# Patient Record
Sex: MALE | Race: WHITE | NOT HISPANIC OR LATINO | Employment: OTHER | URBAN - METROPOLITAN AREA
[De-identification: names, ages, dates, MRNs, and addresses within clinical notes are randomized per-mention and may not be internally consistent; named-entity substitution may affect disease eponyms.]

---

## 2017-01-05 ENCOUNTER — HISTORICAL (OUTPATIENT)
Dept: LAB | Facility: HOSPITAL | Age: 72
End: 2017-01-05

## 2017-01-12 ENCOUNTER — HISTORICAL (OUTPATIENT)
Dept: RADIOLOGY | Facility: HOSPITAL | Age: 72
End: 2017-01-12

## 2017-02-16 ENCOUNTER — HISTORICAL (OUTPATIENT)
Dept: ADMINISTRATIVE | Facility: HOSPITAL | Age: 72
End: 2017-02-16

## 2017-03-18 ENCOUNTER — HISTORICAL (OUTPATIENT)
Dept: ADMINISTRATIVE | Facility: HOSPITAL | Age: 72
End: 2017-03-18

## 2017-03-19 ENCOUNTER — HISTORICAL (OUTPATIENT)
Dept: ADMINISTRATIVE | Facility: HOSPITAL | Age: 72
End: 2017-03-19

## 2017-03-20 ENCOUNTER — HISTORICAL (OUTPATIENT)
Dept: ADMINISTRATIVE | Facility: HOSPITAL | Age: 72
End: 2017-03-20

## 2017-03-21 ENCOUNTER — HISTORICAL (OUTPATIENT)
Dept: ADMINISTRATIVE | Facility: HOSPITAL | Age: 72
End: 2017-03-21

## 2017-03-22 ENCOUNTER — HISTORICAL (OUTPATIENT)
Dept: ADMINISTRATIVE | Facility: HOSPITAL | Age: 72
End: 2017-03-22

## 2017-03-23 ENCOUNTER — HISTORICAL (OUTPATIENT)
Dept: ADMINISTRATIVE | Facility: HOSPITAL | Age: 72
End: 2017-03-23

## 2017-03-24 ENCOUNTER — HISTORICAL (OUTPATIENT)
Dept: ADMINISTRATIVE | Facility: HOSPITAL | Age: 72
End: 2017-03-24

## 2017-03-25 ENCOUNTER — HISTORICAL (OUTPATIENT)
Dept: ADMINISTRATIVE | Facility: HOSPITAL | Age: 72
End: 2017-03-25

## 2017-03-26 ENCOUNTER — HISTORICAL (OUTPATIENT)
Dept: ADMINISTRATIVE | Facility: HOSPITAL | Age: 72
End: 2017-03-26

## 2017-03-27 ENCOUNTER — HISTORICAL (OUTPATIENT)
Dept: ADMINISTRATIVE | Facility: HOSPITAL | Age: 72
End: 2017-03-27

## 2017-03-28 ENCOUNTER — HISTORICAL (OUTPATIENT)
Dept: ADMINISTRATIVE | Facility: HOSPITAL | Age: 72
End: 2017-03-28

## 2017-03-29 ENCOUNTER — HISTORICAL (OUTPATIENT)
Dept: ADMINISTRATIVE | Facility: HOSPITAL | Age: 72
End: 2017-03-29

## 2017-03-30 ENCOUNTER — HISTORICAL (OUTPATIENT)
Dept: ADMINISTRATIVE | Facility: HOSPITAL | Age: 72
End: 2017-03-30

## 2017-03-31 ENCOUNTER — HISTORICAL (OUTPATIENT)
Dept: ADMINISTRATIVE | Facility: HOSPITAL | Age: 72
End: 2017-03-31

## 2017-04-01 ENCOUNTER — HISTORICAL (OUTPATIENT)
Dept: INFUSION THERAPY | Facility: HOSPITAL | Age: 72
End: 2017-04-01

## 2017-04-02 ENCOUNTER — HISTORICAL (OUTPATIENT)
Dept: ADMINISTRATIVE | Facility: HOSPITAL | Age: 72
End: 2017-04-02

## 2017-04-03 ENCOUNTER — HISTORICAL (OUTPATIENT)
Dept: ADMINISTRATIVE | Facility: HOSPITAL | Age: 72
End: 2017-04-03

## 2017-04-04 ENCOUNTER — HISTORICAL (OUTPATIENT)
Dept: INFUSION THERAPY | Facility: HOSPITAL | Age: 72
End: 2017-04-04

## 2017-04-05 ENCOUNTER — HISTORICAL (OUTPATIENT)
Dept: INFUSION THERAPY | Facility: HOSPITAL | Age: 72
End: 2017-04-05

## 2017-04-06 ENCOUNTER — HISTORICAL (OUTPATIENT)
Dept: INFUSION THERAPY | Facility: HOSPITAL | Age: 72
End: 2017-04-06

## 2017-04-07 ENCOUNTER — HISTORICAL (OUTPATIENT)
Dept: INFUSION THERAPY | Facility: HOSPITAL | Age: 72
End: 2017-04-07

## 2017-04-08 ENCOUNTER — HISTORICAL (OUTPATIENT)
Dept: INFUSION THERAPY | Facility: HOSPITAL | Age: 72
End: 2017-04-08

## 2017-04-09 ENCOUNTER — HISTORICAL (OUTPATIENT)
Dept: INFUSION THERAPY | Facility: HOSPITAL | Age: 72
End: 2017-04-09

## 2017-04-10 ENCOUNTER — HISTORICAL (OUTPATIENT)
Dept: INFUSION THERAPY | Facility: HOSPITAL | Age: 72
End: 2017-04-10

## 2017-04-11 ENCOUNTER — HISTORICAL (OUTPATIENT)
Dept: INFUSION THERAPY | Facility: HOSPITAL | Age: 72
End: 2017-04-11

## 2017-04-12 ENCOUNTER — HISTORICAL (OUTPATIENT)
Dept: INFUSION THERAPY | Facility: HOSPITAL | Age: 72
End: 2017-04-12

## 2017-04-13 ENCOUNTER — HISTORICAL (OUTPATIENT)
Dept: INFUSION THERAPY | Facility: HOSPITAL | Age: 72
End: 2017-04-13

## 2017-04-18 ENCOUNTER — HISTORICAL (OUTPATIENT)
Dept: ADMINISTRATIVE | Facility: HOSPITAL | Age: 72
End: 2017-04-18

## 2017-04-27 ENCOUNTER — HISTORICAL (OUTPATIENT)
Dept: LAB | Facility: HOSPITAL | Age: 72
End: 2017-04-27

## 2017-05-30 ENCOUNTER — HISTORICAL (OUTPATIENT)
Dept: ADMINISTRATIVE | Facility: HOSPITAL | Age: 72
End: 2017-05-30

## 2017-06-09 ENCOUNTER — HISTORICAL (OUTPATIENT)
Dept: LAB | Facility: HOSPITAL | Age: 72
End: 2017-06-09

## 2017-06-09 LAB
ALBUMIN SERPL-MCNC: 3.6 GM/DL (ref 3.4–5)
ALBUMIN/GLOB SERPL: 1.1 RATIO (ref 1.1–2)
ALP SERPL-CCNC: 73 UNIT/L (ref 46–116)
ALT SERPL-CCNC: 14 UNIT/L (ref 12–78)
AST SERPL-CCNC: 10 UNIT/L (ref 15–37)
BILIRUB SERPL-MCNC: 0.7 MG/DL (ref 0.2–1)
BILIRUBIN DIRECT+TOT PNL SERPL-MCNC: 0.2 MG/DL (ref 0–0.2)
BILIRUBIN DIRECT+TOT PNL SERPL-MCNC: 0.5 MG/DL (ref 0–0.8)
BUN SERPL-MCNC: 23 MG/DL (ref 7–18)
CALCIUM SERPL-MCNC: 8.6 MG/DL (ref 8.5–10.1)
CHLORIDE SERPL-SCNC: 105 MMOL/L (ref 98–107)
CO2 SERPL-SCNC: 25.8 MMOL/L (ref 21–32)
CREAT SERPL-MCNC: 1.38 MG/DL (ref 0.6–1.3)
CRP SERPL-MCNC: 9.8 MG/DL (ref 0–0.9)
ERYTHROCYTE [SEDIMENTATION RATE] IN BLOOD: 80 MM/HR (ref 0–15)
GLOBULIN SER-MCNC: 3.4 GM/DL (ref 2.4–3.5)
GLUCOSE SERPL-MCNC: 98 MG/DL (ref 74–106)
POTASSIUM SERPL-SCNC: 3.8 MMOL/L (ref 3.5–5.1)
PROT SERPL-MCNC: 7 GM/DL (ref 6.4–8.2)
SODIUM SERPL-SCNC: 141 MMOL/L (ref 136–145)

## 2017-07-20 ENCOUNTER — HISTORICAL (OUTPATIENT)
Dept: ADMINISTRATIVE | Facility: HOSPITAL | Age: 72
End: 2017-07-20

## 2017-07-20 LAB
ABS NEUT (OLG): 3.24 X10(3)/MCL (ref 2.1–9.2)
APTT PPP: 46.3 SECOND(S) (ref 20.6–36)
BASOPHILS # BLD AUTO: 0 X10(3)/MCL (ref 0–0.2)
BASOPHILS NFR BLD AUTO: 1 %
BUN SERPL-MCNC: 33 MG/DL (ref 7–18)
CALCIUM SERPL-MCNC: 8.4 MG/DL (ref 8.5–10.1)
CHLORIDE SERPL-SCNC: 110 MMOL/L (ref 98–107)
CO2 SERPL-SCNC: 22 MMOL/L (ref 21–32)
CREAT SERPL-MCNC: 2.22 MG/DL (ref 0.7–1.3)
EOSINOPHIL # BLD AUTO: 0.3 X10(3)/MCL (ref 0–0.9)
EOSINOPHIL NFR BLD AUTO: 6 %
ERYTHROCYTE [DISTWIDTH] IN BLOOD BY AUTOMATED COUNT: 15.6 % (ref 11.5–17)
GLUCOSE SERPL-MCNC: 86 MG/DL (ref 74–106)
HCT VFR BLD AUTO: 33.6 % (ref 42–52)
HGB BLD-MCNC: 10.3 GM/DL (ref 14–18)
INR PPP: 1.47 (ref 0–1.27)
LYMPHOCYTES # BLD AUTO: 1 X10(3)/MCL (ref 0.6–4.6)
LYMPHOCYTES NFR BLD AUTO: 20 %
MAGNESIUM SERPL-MCNC: 2 MG/DL (ref 1.8–2.4)
MCH RBC QN AUTO: 25.6 PG (ref 27–31)
MCHC RBC AUTO-ENTMCNC: 30.7 GM/DL (ref 33–36)
MCV RBC AUTO: 83.6 FL (ref 80–94)
MONOCYTES # BLD AUTO: 0.5 X10(3)/MCL (ref 0.1–1.3)
MONOCYTES NFR BLD AUTO: 9 %
NEUTROPHILS # BLD AUTO: 3.24 X10(3)/MCL (ref 1.4–7.9)
NEUTROPHILS NFR BLD AUTO: 63 %
PLATELET # BLD AUTO: 188 X10(3)/MCL (ref 130–400)
PMV BLD AUTO: 11 FL (ref 9.4–12.4)
POTASSIUM SERPL-SCNC: 3.9 MMOL/L (ref 3.5–5.1)
PROTHROMBIN TIME: 17.6 SECOND(S) (ref 12.1–14.2)
RBC # BLD AUTO: 4.02 X10(6)/MCL (ref 4.7–6.1)
SODIUM SERPL-SCNC: 143 MMOL/L (ref 136–145)
WBC # SPEC AUTO: 5.1 X10(3)/MCL (ref 4.5–11.5)

## 2017-09-06 ENCOUNTER — HISTORICAL (OUTPATIENT)
Dept: LAB | Facility: HOSPITAL | Age: 72
End: 2017-09-06

## 2017-09-06 LAB
ALBUMIN SERPL-MCNC: 3.9 GM/DL (ref 3.4–5)
ALBUMIN/GLOB SERPL: 1.1 RATIO (ref 1.1–2)
ALP SERPL-CCNC: 82 UNIT/L (ref 46–116)
ALT SERPL-CCNC: 17 UNIT/L (ref 12–78)
AST SERPL-CCNC: 12 UNIT/L (ref 15–37)
BILIRUB SERPL-MCNC: 1 MG/DL (ref 0.2–1)
BILIRUBIN DIRECT+TOT PNL SERPL-MCNC: 0.36 MG/DL (ref 0–0.2)
BILIRUBIN DIRECT+TOT PNL SERPL-MCNC: 0.64 MG/DL (ref 0–0.8)
BUN SERPL-MCNC: 22.9 MG/DL (ref 7–18)
CALCIUM SERPL-MCNC: 9 MG/DL (ref 8.5–10.1)
CHLORIDE SERPL-SCNC: 104 MMOL/L (ref 98–107)
CO2 SERPL-SCNC: 27.1 MMOL/L (ref 21–32)
CREAT SERPL-MCNC: 1.33 MG/DL (ref 0.6–1.3)
CRP SERPL-MCNC: 5.3 MG/DL (ref 0–0.9)
ERYTHROCYTE [SEDIMENTATION RATE] IN BLOOD: 55 MM/HR (ref 0–15)
GLOBULIN SER-MCNC: 3.7 GM/DL (ref 2.4–3.5)
GLUCOSE SERPL-MCNC: 95 MG/DL (ref 74–106)
POTASSIUM SERPL-SCNC: 3.8 MMOL/L (ref 3.5–5.1)
PROT SERPL-MCNC: 7.6 GM/DL (ref 6.4–8.2)
SODIUM SERPL-SCNC: 143 MMOL/L (ref 136–145)

## 2017-09-08 ENCOUNTER — HISTORICAL (OUTPATIENT)
Dept: LAB | Facility: HOSPITAL | Age: 72
End: 2017-09-08

## 2017-09-08 LAB
ABS NEUT (OLG): 3.09 X10(3)/MCL (ref 2.1–9.2)
ALBUMIN SERPL-MCNC: 3.7 GM/DL (ref 3.4–5)
ALP SERPL-CCNC: 80 UNIT/L (ref 50–136)
ALT SERPL-CCNC: 18 UNIT/L (ref 12–78)
APTT PPP: 39.4 SECOND(S) (ref 20.6–36)
AST SERPL-CCNC: 9 UNIT/L (ref 15–37)
BASOPHILS # BLD AUTO: 0 X10(3)/MCL (ref 0–0.2)
BASOPHILS NFR BLD AUTO: 1 %
BILIRUB SERPL-MCNC: 1.1 MG/DL (ref 0.2–1)
BILIRUBIN DIRECT+TOT PNL SERPL-MCNC: 0.4 MG/DL (ref 0–0.2)
BILIRUBIN DIRECT+TOT PNL SERPL-MCNC: 0.7 MG/DL (ref 0–0.8)
BUN SERPL-MCNC: 22 MG/DL (ref 7–18)
CALCIUM SERPL-MCNC: 9 MG/DL (ref 8.5–10.1)
CHLORIDE SERPL-SCNC: 106 MMOL/L (ref 98–107)
CHOLEST SERPL-MCNC: 132 MG/DL (ref 0–200)
CHOLEST/HDLC SERPL: 3 {RATIO} (ref 0–5)
CO2 SERPL-SCNC: 25 MMOL/L (ref 21–32)
CREAT SERPL-MCNC: 1.18 MG/DL (ref 0.7–1.3)
EOSINOPHIL # BLD AUTO: 0.1 X10(3)/MCL (ref 0–0.9)
EOSINOPHIL NFR BLD AUTO: 2 %
ERYTHROCYTE [DISTWIDTH] IN BLOOD BY AUTOMATED COUNT: 16.8 % (ref 11.5–17)
GLUCOSE SERPL-MCNC: 88 MG/DL (ref 74–106)
HCT VFR BLD AUTO: 35.2 % (ref 42–52)
HDLC SERPL-MCNC: 44 MG/DL (ref 35–60)
HGB BLD-MCNC: 10.6 GM/DL (ref 14–18)
INR PPP: 1.78 (ref 0–1.27)
LDLC SERPL CALC-MCNC: 79 MG/DL (ref 0–129)
LIVER PROFILE INTERP: ABNORMAL
LYMPHOCYTES # BLD AUTO: 0.9 X10(3)/MCL (ref 0.6–4.6)
LYMPHOCYTES NFR BLD AUTO: 20 %
MCH RBC QN AUTO: 24.8 PG (ref 27–31)
MCHC RBC AUTO-ENTMCNC: 30.1 GM/DL (ref 33–36)
MCV RBC AUTO: 82.2 FL (ref 80–94)
MONOCYTES # BLD AUTO: 0.5 X10(3)/MCL (ref 0.1–1.3)
MONOCYTES NFR BLD AUTO: 10 %
NEUTROPHILS # BLD AUTO: 3.09 X10(3)/MCL (ref 2.1–9.2)
NEUTROPHILS NFR BLD AUTO: 67 %
PLATELET # BLD AUTO: 209 X10(3)/MCL (ref 130–400)
PMV BLD AUTO: 9.8 FL (ref 9.4–12.4)
POTASSIUM SERPL-SCNC: 4 MMOL/L (ref 3.5–5.1)
PROT SERPL-MCNC: 7.1 GM/DL (ref 6.4–8.2)
PROTHROMBIN TIME: 20.4 SECOND(S) (ref 12.1–14.2)
RBC # BLD AUTO: 4.28 X10(6)/MCL (ref 4.7–6.1)
SODIUM SERPL-SCNC: 139 MMOL/L (ref 136–145)
TRIGL SERPL-MCNC: 46 MG/DL (ref 30–150)
VLDLC SERPL CALC-MCNC: 9 MG/DL
WBC # SPEC AUTO: 4.6 X10(3)/MCL (ref 4.5–11.5)

## 2017-09-15 ENCOUNTER — HISTORICAL (OUTPATIENT)
Dept: CARDIOLOGY | Facility: HOSPITAL | Age: 72
End: 2017-09-15

## 2017-09-15 LAB
APTT PPP: 37.6 SECOND(S) (ref 20.6–36)
INR PPP: 1.35 (ref 0–1.27)
PROTHROMBIN TIME: 16.4 SECOND(S) (ref 12.1–14.2)

## 2017-09-21 ENCOUNTER — HISTORICAL (OUTPATIENT)
Dept: ADMINISTRATIVE | Facility: HOSPITAL | Age: 72
End: 2017-09-21

## 2017-10-04 ENCOUNTER — HISTORICAL (OUTPATIENT)
Dept: PHYSICAL THERAPY | Facility: HOSPITAL | Age: 72
End: 2017-10-04

## 2017-10-06 ENCOUNTER — HISTORICAL (OUTPATIENT)
Dept: PHYSICAL THERAPY | Facility: HOSPITAL | Age: 72
End: 2017-10-06

## 2017-10-09 ENCOUNTER — HISTORICAL (OUTPATIENT)
Dept: PHYSICAL THERAPY | Facility: HOSPITAL | Age: 72
End: 2017-10-09

## 2017-10-10 ENCOUNTER — HISTORICAL (OUTPATIENT)
Dept: PHYSICAL THERAPY | Facility: HOSPITAL | Age: 72
End: 2017-10-10

## 2017-10-12 ENCOUNTER — HISTORICAL (OUTPATIENT)
Dept: PHYSICAL THERAPY | Facility: HOSPITAL | Age: 72
End: 2017-10-12

## 2017-10-17 ENCOUNTER — HISTORICAL (OUTPATIENT)
Dept: PHYSICAL THERAPY | Facility: HOSPITAL | Age: 72
End: 2017-10-17

## 2017-10-19 ENCOUNTER — HISTORICAL (OUTPATIENT)
Dept: PHYSICAL THERAPY | Facility: HOSPITAL | Age: 72
End: 2017-10-19

## 2017-10-23 ENCOUNTER — HISTORICAL (OUTPATIENT)
Dept: PHYSICAL THERAPY | Facility: HOSPITAL | Age: 72
End: 2017-10-23

## 2017-10-24 ENCOUNTER — HISTORICAL (OUTPATIENT)
Dept: PHYSICAL THERAPY | Facility: HOSPITAL | Age: 72
End: 2017-10-24

## 2017-10-26 ENCOUNTER — HISTORICAL (OUTPATIENT)
Dept: PHYSICAL THERAPY | Facility: HOSPITAL | Age: 72
End: 2017-10-26

## 2017-10-30 ENCOUNTER — HISTORICAL (OUTPATIENT)
Dept: PHYSICAL THERAPY | Facility: HOSPITAL | Age: 72
End: 2017-10-30

## 2017-11-02 ENCOUNTER — HISTORICAL (OUTPATIENT)
Dept: PHYSICAL THERAPY | Facility: HOSPITAL | Age: 72
End: 2017-11-02

## 2017-11-06 ENCOUNTER — HISTORICAL (OUTPATIENT)
Dept: PHYSICAL THERAPY | Facility: HOSPITAL | Age: 72
End: 2017-11-06

## 2017-11-13 ENCOUNTER — HISTORICAL (OUTPATIENT)
Dept: PHYSICAL THERAPY | Facility: HOSPITAL | Age: 72
End: 2017-11-13

## 2017-11-16 ENCOUNTER — HISTORICAL (OUTPATIENT)
Dept: PHYSICAL THERAPY | Facility: HOSPITAL | Age: 72
End: 2017-11-16

## 2017-11-20 ENCOUNTER — HISTORICAL (OUTPATIENT)
Dept: PHYSICAL THERAPY | Facility: HOSPITAL | Age: 72
End: 2017-11-20

## 2017-11-27 ENCOUNTER — HISTORICAL (OUTPATIENT)
Dept: PHYSICAL THERAPY | Facility: HOSPITAL | Age: 72
End: 2017-11-27

## 2018-01-03 ENCOUNTER — HISTORICAL (OUTPATIENT)
Dept: LAB | Facility: HOSPITAL | Age: 73
End: 2018-01-03

## 2018-01-03 LAB
ALBUMIN SERPL-MCNC: 3.6 GM/DL (ref 3.4–5)
ALBUMIN/GLOB SERPL: 1 RATIO (ref 1.1–2)
ALP SERPL-CCNC: 106 UNIT/L (ref 46–116)
ALT SERPL-CCNC: 14 UNIT/L (ref 12–78)
AST SERPL-CCNC: 11 UNIT/L (ref 15–37)
BILIRUB SERPL-MCNC: 0.9 MG/DL (ref 0.2–1)
BILIRUBIN DIRECT+TOT PNL SERPL-MCNC: 0.34 MG/DL (ref 0–0.2)
BILIRUBIN DIRECT+TOT PNL SERPL-MCNC: 0.58 MG/DL (ref 0–0.8)
BUN SERPL-MCNC: 17.1 MG/DL (ref 7–18)
CALCIUM SERPL-MCNC: 9.1 MG/DL (ref 8.5–10.1)
CHLORIDE SERPL-SCNC: 104 MMOL/L (ref 98–107)
CO2 SERPL-SCNC: 25.9 MMOL/L (ref 21–32)
CREAT SERPL-MCNC: 1.24 MG/DL (ref 0.6–1.3)
CRP SERPL-MCNC: 4 MG/DL (ref 0–0.9)
ERYTHROCYTE [SEDIMENTATION RATE] IN BLOOD: 54 MM/HR (ref 0–15)
GLOBULIN SER-MCNC: 3.6 GM/DL (ref 2.4–3.5)
GLUCOSE SERPL-MCNC: 103 MG/DL (ref 74–106)
POTASSIUM SERPL-SCNC: 3.6 MMOL/L (ref 3.5–5.1)
PROT SERPL-MCNC: 7.2 GM/DL (ref 6.4–8.2)
SODIUM SERPL-SCNC: 140 MMOL/L (ref 136–145)

## 2018-03-05 ENCOUNTER — HISTORICAL (OUTPATIENT)
Dept: LAB | Facility: HOSPITAL | Age: 73
End: 2018-03-05

## 2018-10-02 ENCOUNTER — HISTORICAL (OUTPATIENT)
Dept: LAB | Facility: HOSPITAL | Age: 73
End: 2018-10-02

## 2018-10-02 LAB
ABS NEUT (OLG): 5.32 X10(3)/MCL (ref 2.1–9.2)
BASOPHILS # BLD AUTO: 0 X10(3)/MCL (ref 0–0.2)
BASOPHILS NFR BLD AUTO: 0 %
EOSINOPHIL # BLD AUTO: 0.2 X10(3)/MCL (ref 0–0.9)
EOSINOPHIL NFR BLD AUTO: 2 %
ERYTHROCYTE [DISTWIDTH] IN BLOOD BY AUTOMATED COUNT: 14.9 % (ref 11.5–17)
HCT VFR BLD AUTO: 41.3 % (ref 42–52)
HGB BLD-MCNC: 13.1 GM/DL (ref 14–18)
IMM GRANULOCYTES # BLD AUTO: 0.01 % (ref 0–0.02)
IMM GRANULOCYTES NFR BLD AUTO: 0.1 % (ref 0–0.43)
LYMPHOCYTES # BLD AUTO: 1.2 X10(3)/MCL (ref 0.6–4.6)
LYMPHOCYTES NFR BLD AUTO: 17 %
MCH RBC QN AUTO: 26.7 PG (ref 27–31)
MCHC RBC AUTO-ENTMCNC: 31.7 GM/DL (ref 33–36)
MCV RBC AUTO: 84.1 FL (ref 80–94)
MONOCYTES # BLD AUTO: 0.6 X10(3)/MCL (ref 0.1–1.3)
MONOCYTES NFR BLD AUTO: 8 %
NEUTROPHILS # BLD AUTO: 5.32 X10(3)/MCL (ref 1.4–7.9)
NEUTROPHILS NFR BLD AUTO: 73 %
PLATELET # BLD AUTO: 266 X10(3)/MCL (ref 130–400)
PMV BLD AUTO: 9.8 FL (ref 9.4–12.4)
RBC # BLD AUTO: 4.91 X10(6)/MCL (ref 4.7–6.1)
WBC # SPEC AUTO: 7.3 X10(3)/MCL (ref 4.5–11.5)

## 2018-11-26 ENCOUNTER — HISTORICAL (OUTPATIENT)
Dept: LAB | Facility: HOSPITAL | Age: 73
End: 2018-11-26

## 2018-11-26 LAB — PSA SERPL-MCNC: 0.11 NG/ML (ref 0–4)

## 2018-12-03 ENCOUNTER — HISTORICAL (OUTPATIENT)
Dept: RADIOLOGY | Facility: HOSPITAL | Age: 73
End: 2018-12-03

## 2018-12-03 LAB — URATE SERPL-MCNC: 8.3 MG/DL (ref 3.4–7)

## 2019-01-24 ENCOUNTER — HISTORICAL (OUTPATIENT)
Dept: ADMINISTRATIVE | Facility: HOSPITAL | Age: 74
End: 2019-01-24

## 2019-01-24 LAB
ALBUMIN SERPL-MCNC: 4 G/DL (ref 3.5–4.8)
ALBUMIN/GLOB SERPL: 1.5 {RATIO} (ref 1.2–2.2)
ALP SERPL-CCNC: 86 IU/L (ref 39–117)
ALT SERPL-CCNC: 11 IU/L (ref 0–44)
AST SERPL-CCNC: 12 IU/L (ref 0–40)
BILIRUB SERPL-MCNC: 1.1 MG/DL (ref 0–1.2)
BUN SERPL-MCNC: 18 MG/DL (ref 8–27)
CALCIUM SERPL-MCNC: 9 MG/DL (ref 8.6–10.2)
CHLORIDE SERPL-SCNC: 101 MMOL/L (ref 96–106)
CHOLEST SERPL-MCNC: 104 MG/DL (ref 100–199)
CHOLEST/HDLC SERPL: 2.8 RATIO (ref 0–5)
CO2 SERPL-SCNC: 21 MMOL/L (ref 20–29)
CREAT SERPL-MCNC: 1.35 MG/DL (ref 0.76–1.27)
CREAT/UREA NIT SERPL: 13 (ref 10–24)
GLOBULIN SER-MCNC: 2.7 G/DL (ref 1.5–4.5)
GLUCOSE SERPL-MCNC: 105 MG/DL (ref 65–99)
HDLC SERPL-MCNC: 37 MG/DL
LDLC SERPL CALC-MCNC: 54 MG/DL (ref 0–99)
POTASSIUM SERPL-SCNC: 3.7 MMOL/L (ref 3.5–5.2)
PROT SERPL-MCNC: 6.7 G/DL (ref 6–8.5)
SODIUM SERPL-SCNC: 140 MMOL/L (ref 134–144)
TRIGL SERPL-MCNC: 63 MG/DL (ref 0–149)
VLDLC SERPL CALC-MCNC: 13 MG/DL (ref 5–40)

## 2019-07-24 ENCOUNTER — HISTORICAL (OUTPATIENT)
Dept: ADMINISTRATIVE | Facility: HOSPITAL | Age: 74
End: 2019-07-24

## 2019-07-24 LAB
ALBUMIN SERPL-MCNC: 4.1 G/DL (ref 3.5–4.8)
ALBUMIN/GLOB SERPL: 1.5 {RATIO} (ref 1.2–2.2)
ALP SERPL-CCNC: 85 IU/L (ref 39–117)
ALT SERPL-CCNC: 14 IU/L (ref 0–44)
AST SERPL-CCNC: 18 IU/L (ref 0–40)
BILIRUB SERPL-MCNC: 0.8 MG/DL (ref 0–1.2)
BUN SERPL-MCNC: 17 MG/DL (ref 8–27)
CALCIUM SERPL-MCNC: 9.3 MG/DL (ref 8.6–10.2)
CHLORIDE SERPL-SCNC: 99 MMOL/L (ref 96–106)
CHOLEST SERPL-MCNC: 107 MG/DL (ref 100–199)
CHOLEST/HDLC SERPL: 3.1 RATIO (ref 0–5)
CO2 SERPL-SCNC: 21 MMOL/L (ref 20–29)
CREAT SERPL-MCNC: 1.08 MG/DL (ref 0.76–1.27)
CREAT/UREA NIT SERPL: 16 (ref 10–24)
GLOBULIN SER-MCNC: 2.8 G/DL (ref 1.5–4.5)
GLUCOSE SERPL-MCNC: 121 MG/DL (ref 65–99)
HDLC SERPL-MCNC: 35 MG/DL
LDLC SERPL CALC-MCNC: 61 MG/DL (ref 0–99)
POTASSIUM SERPL-SCNC: 3.9 MMOL/L (ref 3.5–5.2)
PROT SERPL-MCNC: 6.9 G/DL (ref 6–8.5)
SODIUM SERPL-SCNC: 140 MMOL/L (ref 134–144)
TRIGL SERPL-MCNC: 56 MG/DL (ref 0–149)
VLDLC SERPL CALC-MCNC: 11 MG/DL (ref 5–40)

## 2019-08-20 ENCOUNTER — HISTORICAL (OUTPATIENT)
Dept: RADIOLOGY | Facility: HOSPITAL | Age: 74
End: 2019-08-20

## 2019-08-20 LAB
CREAT UR-MCNC: 101.2 MG/DL (ref 30–125)
MICROALBUMIN UR-MCNC: 0.4 MG/DL (ref 0–3)
MICROALBUMIN/CREAT RATIO PNL UR: 4 MG/GM CR (ref 0–30)

## 2019-09-26 ENCOUNTER — HISTORICAL (OUTPATIENT)
Dept: ADMINISTRATIVE | Facility: HOSPITAL | Age: 74
End: 2019-09-26

## 2019-09-27 ENCOUNTER — HISTORICAL (OUTPATIENT)
Dept: LAB | Facility: HOSPITAL | Age: 74
End: 2019-09-27

## 2019-09-27 LAB
ABS NEUT (OLG): 8.09 X10(3)/MCL (ref 2.1–9.2)
CRP SERPL HS-MCNC: 36.79 MG/L (ref 0–3)
EOSINOPHIL # BLD AUTO: 0.2 X10(3)/MCL (ref 0–0.9)
EOSINOPHIL NFR BLD AUTO: 2 %
ERYTHROCYTE [DISTWIDTH] IN BLOOD BY AUTOMATED COUNT: 15.3 % (ref 11.5–17)
ERYTHROCYTE [SEDIMENTATION RATE] IN BLOOD: 32 MM/HR (ref 0–15)
HCT VFR BLD AUTO: 43.9 % (ref 42–52)
HGB BLD-MCNC: 13.7 GM/DL (ref 14–18)
IMM GRANULOCYTES # BLD AUTO: 0.01 % (ref 0–0.02)
IMM GRANULOCYTES NFR BLD AUTO: 0.1 % (ref 0–0.43)
LYMPHOCYTES # BLD AUTO: 0.3 X10(3)/MCL (ref 0.6–4.6)
LYMPHOCYTES NFR BLD AUTO: 3 %
MCH RBC QN AUTO: 28.4 PG (ref 27–31)
MCHC RBC AUTO-ENTMCNC: 31.2 GM/DL (ref 33–36)
MCV RBC AUTO: 91.1 FL (ref 80–94)
MONOCYTES # BLD AUTO: 0.4 X10(3)/MCL (ref 0.1–1.3)
MONOCYTES NFR BLD AUTO: 5 %
NEUTROPHILS # BLD AUTO: 8.09 X10(3)/MCL (ref 1.4–7.9)
NEUTROPHILS NFR BLD AUTO: 89 %
PLATELET # BLD AUTO: 200 X10(3)/MCL (ref 130–400)
PMV BLD AUTO: 10 FL (ref 9.4–12.4)
RBC # BLD AUTO: 4.82 X10(6)/MCL (ref 4.7–6.1)
WBC # SPEC AUTO: 9.1 X10(3)/MCL (ref 4.5–11.5)

## 2019-11-18 ENCOUNTER — HISTORICAL (OUTPATIENT)
Dept: LAB | Facility: HOSPITAL | Age: 74
End: 2019-11-18

## 2019-11-18 LAB — PSA SERPL-MCNC: 0.57 NG/ML (ref 0–4)

## 2020-01-29 ENCOUNTER — HISTORICAL (OUTPATIENT)
Dept: ADMINISTRATIVE | Facility: HOSPITAL | Age: 75
End: 2020-01-29

## 2020-01-29 LAB
ALBUMIN SERPL-MCNC: 4.3 G/DL (ref 3.7–4.7)
ALBUMIN/GLOB SERPL: 2 {RATIO} (ref 1.2–2.2)
ALP SERPL-CCNC: 93 IU/L (ref 39–117)
ALT SERPL-CCNC: 11 IU/L (ref 0–44)
AST SERPL-CCNC: 13 IU/L (ref 0–40)
BASOPHILS # BLD AUTO: 0 X10E3/UL (ref 0–0.2)
BASOPHILS NFR BLD AUTO: 1 %
BILIRUB SERPL-MCNC: 1.1 MG/DL (ref 0–1.2)
BUN SERPL-MCNC: 24 MG/DL (ref 8–27)
CALCIUM SERPL-MCNC: 9 MG/DL (ref 8.6–10.2)
CHLORIDE SERPL-SCNC: 106 MMOL/L (ref 96–106)
CHOLEST SERPL-MCNC: 96 MG/DL (ref 100–199)
CHOLEST/HDLC SERPL: 2.6 RATIO (ref 0–5)
CO2 SERPL-SCNC: 19 MMOL/L (ref 20–29)
CREAT SERPL-MCNC: 1.26 MG/DL (ref 0.76–1.27)
CREAT/UREA NIT SERPL: 19 (ref 10–24)
EOSINOPHIL # BLD AUTO: 0.1 X10E3/UL (ref 0–0.4)
EOSINOPHIL NFR BLD AUTO: 3 %
ERYTHROCYTE [DISTWIDTH] IN BLOOD BY AUTOMATED COUNT: 14.9 % (ref 11.6–15.4)
GLOBULIN SER-MCNC: 2.2 G/DL (ref 1.5–4.5)
GLUCOSE SERPL-MCNC: 152 MG/DL (ref 65–99)
HCT VFR BLD AUTO: 38 % (ref 37.5–51)
HDLC SERPL-MCNC: 37 MG/DL
HGB BLD-MCNC: 11.8 G/DL (ref 13–17.7)
LDLC SERPL CALC-MCNC: 51 MG/DL (ref 0–99)
LYMPHOCYTES # BLD AUTO: 0.7 X10E3/UL (ref 0.7–3.1)
LYMPHOCYTES NFR BLD AUTO: 12 %
MCH RBC QN AUTO: 27.8 PG (ref 26.6–33)
MCHC RBC AUTO-ENTMCNC: 31.1 G/DL (ref 31.5–35.7)
MCV RBC AUTO: 90 FL (ref 79–97)
MONOCYTES # BLD AUTO: 0.5 X10E3/UL (ref 0.1–0.9)
MONOCYTES NFR BLD AUTO: 9 %
NEUTROPHILS # BLD AUTO: 4.3 X10E3/UL (ref 1.4–7)
NEUTROPHILS NFR BLD AUTO: 75 %
PLATELET # BLD AUTO: 140 X10E3/UL (ref 150–450)
POTASSIUM SERPL-SCNC: 4.6 MMOL/L (ref 3.5–5.2)
PROT SERPL-MCNC: 6.5 G/DL (ref 6–8.5)
PSA SERPL-MCNC: 0.2 NG/ML (ref 0–4)
RBC # BLD AUTO: 4.24 X10(6)/MCL (ref 4.14–5.8)
SODIUM SERPL-SCNC: 141 MMOL/L (ref 134–144)
TRIGL SERPL-MCNC: 38 MG/DL (ref 0–149)
VLDLC SERPL CALC-MCNC: 8 MG/DL (ref 5–40)
WBC # SPEC AUTO: 5.7 X10E3/UL (ref 3.4–10.8)

## 2020-02-04 ENCOUNTER — HISTORICAL (OUTPATIENT)
Dept: ADMINISTRATIVE | Facility: HOSPITAL | Age: 75
End: 2020-02-04

## 2020-02-04 LAB
ABS NEUT (OLG): 3.83 X10(3)/MCL (ref 2.1–9.2)
BASOPHILS # BLD AUTO: 0.03 X10(3)/MCL (ref 0–0.2)
BASOPHILS NFR BLD AUTO: 0.5 % (ref 0–0.9)
CRP SERPL HS-MCNC: 43.8 MG/L
EOSINOPHIL # BLD AUTO: 0.17 X10(3)/MCL (ref 0–0.9)
EOSINOPHIL NFR BLD AUTO: 3 % (ref 0–6.5)
ERYTHROCYTE [DISTWIDTH] IN BLOOD BY AUTOMATED COUNT: 14.9 % (ref 11.5–17)
ERYTHROCYTE [SEDIMENTATION RATE] IN BLOOD: 44 MM/HR (ref 0–20)
HCT VFR BLD AUTO: 36.9 % (ref 42–52)
HGB BLD-MCNC: 11.5 GM/DL (ref 14–18)
IMM GRANULOCYTES # BLD AUTO: 0.01 10*3/UL (ref 0–0.02)
IMM GRANULOCYTES NFR BLD AUTO: 0.2 % (ref 0–0.43)
LYMPHOCYTES # BLD AUTO: 1.1 X10(3)/MCL (ref 0.6–4.6)
LYMPHOCYTES NFR BLD AUTO: 19.5 % (ref 16.2–38.3)
MCH RBC QN AUTO: 28 PG (ref 27–31)
MCHC RBC AUTO-ENTMCNC: 31.2 GM/DL (ref 33–36)
MCV RBC AUTO: 90 FL (ref 80–94)
MONOCYTES # BLD AUTO: 0.51 X10(3)/MCL (ref 0.1–1.3)
MONOCYTES NFR BLD AUTO: 9 % (ref 4.7–11.3)
NEUTROPHILS # BLD AUTO: 3.83 X10(3)/MCL (ref 2.1–9.2)
NEUTROPHILS NFR BLD AUTO: 67.8 % (ref 49.1–73.4)
NRBC BLD AUTO-RTO: 0 % (ref 0–0.2)
PLATELET # BLD AUTO: 141 X10(3)/MCL (ref 130–400)
PMV BLD AUTO: 10.3 FL (ref 7.4–10.4)
RBC # BLD AUTO: 4.1 X10(6)/MCL (ref 4.7–6.1)
WBC # SPEC AUTO: 5.6 X10(3)/MCL (ref 4.5–11.5)

## 2020-02-19 ENCOUNTER — HISTORICAL (OUTPATIENT)
Dept: SURGERY | Facility: HOSPITAL | Age: 75
End: 2020-02-19

## 2020-02-20 LAB — GRAM STN SPEC: NORMAL

## 2020-02-22 LAB — FINAL CULTURE: NORMAL

## 2020-05-16 ENCOUNTER — HISTORICAL (OUTPATIENT)
Dept: INFUSION THERAPY | Facility: HOSPITAL | Age: 75
End: 2020-05-16

## 2020-05-17 ENCOUNTER — HISTORICAL (OUTPATIENT)
Dept: INFUSION THERAPY | Facility: HOSPITAL | Age: 75
End: 2020-05-17

## 2020-05-18 ENCOUNTER — HISTORICAL (OUTPATIENT)
Dept: PHYSICAL THERAPY | Facility: HOSPITAL | Age: 75
End: 2020-05-18

## 2020-05-18 ENCOUNTER — HISTORICAL (OUTPATIENT)
Dept: INFUSION THERAPY | Facility: HOSPITAL | Age: 75
End: 2020-05-18

## 2020-05-19 ENCOUNTER — HISTORICAL (OUTPATIENT)
Dept: INFUSION THERAPY | Facility: HOSPITAL | Age: 75
End: 2020-05-19

## 2020-05-20 ENCOUNTER — HISTORICAL (OUTPATIENT)
Dept: INFUSION THERAPY | Facility: HOSPITAL | Age: 75
End: 2020-05-20

## 2020-05-20 ENCOUNTER — HISTORICAL (OUTPATIENT)
Dept: PHYSICAL THERAPY | Facility: HOSPITAL | Age: 75
End: 2020-05-20

## 2020-05-21 ENCOUNTER — HISTORICAL (OUTPATIENT)
Dept: INFUSION THERAPY | Facility: HOSPITAL | Age: 75
End: 2020-05-21

## 2020-05-21 LAB
ABS NEUT (OLG): 5.9 X10(3)/MCL (ref 2.1–9.2)
ALBUMIN SERPL-MCNC: 3.8 GM/DL (ref 3.4–5)
ALBUMIN/GLOB SERPL: 1.4 RATIO (ref 1.1–2)
ALP SERPL-CCNC: 111 UNIT/L (ref 46–116)
ALT SERPL-CCNC: 32 UNIT/L (ref 12–78)
AST SERPL-CCNC: 33 UNIT/L (ref 15–37)
BASOPHILS # BLD AUTO: 0 X10(3)/MCL (ref 0–0.2)
BASOPHILS NFR BLD AUTO: 1 %
BILIRUB SERPL-MCNC: 0.8 MG/DL (ref 0.2–1)
BILIRUBIN DIRECT+TOT PNL SERPL-MCNC: 0.36 MG/DL (ref 0–0.2)
BILIRUBIN DIRECT+TOT PNL SERPL-MCNC: 0.44 MG/DL (ref 0–0.8)
BUN SERPL-MCNC: 18.2 MG/DL (ref 7–18)
CALCIUM SERPL-MCNC: 9.5 MG/DL (ref 8.5–10.1)
CHLORIDE SERPL-SCNC: 105 MMOL/L (ref 98–107)
CK SERPL-CCNC: 119 UNIT/L (ref 26–308)
CO2 SERPL-SCNC: 24.7 MMOL/L (ref 21–32)
CREAT SERPL-MCNC: 1.38 MG/DL (ref 0.6–1.3)
CRP SERPL-MCNC: 4.1 MG/DL (ref 0–0.9)
EOSINOPHIL # BLD AUTO: 0.6 X10(3)/MCL (ref 0–0.9)
EOSINOPHIL NFR BLD AUTO: 7 %
ERYTHROCYTE [DISTWIDTH] IN BLOOD BY AUTOMATED COUNT: 16.2 % (ref 11.5–17)
ERYTHROCYTE [SEDIMENTATION RATE] IN BLOOD: 34 MM/HR (ref 0–15)
GLOBULIN SER-MCNC: 2.7 GM/DL (ref 2.4–3.5)
GLUCOSE SERPL-MCNC: 134 MG/DL (ref 74–106)
HCT VFR BLD AUTO: 35.2 % (ref 42–52)
HGB BLD-MCNC: 11 GM/DL (ref 14–18)
IMM GRANULOCYTES # BLD AUTO: 0.01 % (ref 0–0.02)
IMM GRANULOCYTES NFR BLD AUTO: 0.1 % (ref 0–0.43)
LYMPHOCYTES # BLD AUTO: 1.1 X10(3)/MCL (ref 0.6–4.6)
LYMPHOCYTES NFR BLD AUTO: 14 %
MCH RBC QN AUTO: 27.6 PG (ref 27–31)
MCHC RBC AUTO-ENTMCNC: 31.3 GM/DL (ref 33–36)
MCV RBC AUTO: 88.2 FL (ref 80–94)
MONOCYTES # BLD AUTO: 0.5 X10(3)/MCL (ref 0.1–1.3)
MONOCYTES NFR BLD AUTO: 6 %
NEUTROPHILS # BLD AUTO: 5.9 X10(3)/MCL (ref 1.4–7.9)
NEUTROPHILS NFR BLD AUTO: 72 %
PLATELET # BLD AUTO: 261 X10(3)/MCL (ref 130–400)
PMV BLD AUTO: 10.2 FL (ref 9.4–12.4)
POTASSIUM SERPL-SCNC: 3.8 MMOL/L (ref 3.5–5.1)
PROT SERPL-MCNC: 6.5 GM/DL (ref 6.4–8.2)
RBC # BLD AUTO: 3.99 X10(6)/MCL (ref 4.7–6.1)
SODIUM SERPL-SCNC: 142 MMOL/L (ref 136–145)
WBC # SPEC AUTO: 8.2 X10(3)/MCL (ref 4.5–11.5)

## 2020-05-22 ENCOUNTER — HISTORICAL (OUTPATIENT)
Dept: PHYSICAL THERAPY | Facility: HOSPITAL | Age: 75
End: 2020-05-22

## 2020-05-22 ENCOUNTER — HISTORICAL (OUTPATIENT)
Dept: INFUSION THERAPY | Facility: HOSPITAL | Age: 75
End: 2020-05-22

## 2020-05-23 ENCOUNTER — HISTORICAL (OUTPATIENT)
Dept: INFUSION THERAPY | Facility: HOSPITAL | Age: 75
End: 2020-05-23

## 2020-05-24 ENCOUNTER — HISTORICAL (OUTPATIENT)
Dept: INFUSION THERAPY | Facility: HOSPITAL | Age: 75
End: 2020-05-24

## 2020-05-25 ENCOUNTER — HISTORICAL (OUTPATIENT)
Dept: PHYSICAL THERAPY | Facility: HOSPITAL | Age: 75
End: 2020-05-25

## 2020-05-25 ENCOUNTER — HISTORICAL (OUTPATIENT)
Dept: INFUSION THERAPY | Facility: HOSPITAL | Age: 75
End: 2020-05-25

## 2020-05-26 ENCOUNTER — HISTORICAL (OUTPATIENT)
Dept: INFUSION THERAPY | Facility: HOSPITAL | Age: 75
End: 2020-05-26

## 2020-05-27 ENCOUNTER — HISTORICAL (OUTPATIENT)
Dept: PHYSICAL THERAPY | Facility: HOSPITAL | Age: 75
End: 2020-05-27

## 2020-05-27 ENCOUNTER — HISTORICAL (OUTPATIENT)
Dept: INFUSION THERAPY | Facility: HOSPITAL | Age: 75
End: 2020-05-27

## 2020-05-28 ENCOUNTER — HISTORICAL (OUTPATIENT)
Dept: INFUSION THERAPY | Facility: HOSPITAL | Age: 75
End: 2020-05-28

## 2020-05-28 LAB
ABS NEUT (OLG): 5.35 X10(3)/MCL (ref 2.1–9.2)
ALBUMIN SERPL-MCNC: 3.7 GM/DL (ref 3.4–5)
ALBUMIN/GLOB SERPL: 1.4 RATIO (ref 1.1–2)
ALP SERPL-CCNC: 116 UNIT/L (ref 46–116)
ALT SERPL-CCNC: 31 UNIT/L (ref 12–78)
AST SERPL-CCNC: 29 UNIT/L (ref 15–37)
BASOPHILS # BLD AUTO: 0 X10(3)/MCL (ref 0–0.2)
BASOPHILS NFR BLD AUTO: 0 %
BILIRUB SERPL-MCNC: 0.7 MG/DL (ref 0.2–1)
BILIRUBIN DIRECT+TOT PNL SERPL-MCNC: 0.31 MG/DL (ref 0–0.2)
BILIRUBIN DIRECT+TOT PNL SERPL-MCNC: 0.39 MG/DL (ref 0–0.8)
BUN SERPL-MCNC: 17.3 MG/DL (ref 7–18)
CALCIUM SERPL-MCNC: 9 MG/DL (ref 8.5–10.1)
CHLORIDE SERPL-SCNC: 105 MMOL/L (ref 98–107)
CK SERPL-CCNC: 114 UNIT/L (ref 26–308)
CO2 SERPL-SCNC: 23.6 MMOL/L (ref 21–32)
CREAT SERPL-MCNC: 1.27 MG/DL (ref 0.6–1.3)
CRP SERPL-MCNC: 1.6 MG/DL (ref 0–0.9)
EOSINOPHIL # BLD AUTO: 0.5 X10(3)/MCL (ref 0–0.9)
EOSINOPHIL NFR BLD AUTO: 7 %
ERYTHROCYTE [DISTWIDTH] IN BLOOD BY AUTOMATED COUNT: 16.1 % (ref 11.5–17)
ERYTHROCYTE [SEDIMENTATION RATE] IN BLOOD: 18 MM/HR (ref 0–15)
GLOBULIN SER-MCNC: 2.7 GM/DL (ref 2.4–3.5)
GLUCOSE SERPL-MCNC: 103 MG/DL (ref 74–106)
HCT VFR BLD AUTO: 35.3 % (ref 42–52)
HGB BLD-MCNC: 11 GM/DL (ref 14–18)
IMM GRANULOCYTES # BLD AUTO: 0.02 % (ref 0–0.02)
IMM GRANULOCYTES NFR BLD AUTO: 0.3 % (ref 0–0.43)
LYMPHOCYTES # BLD AUTO: 1.1 X10(3)/MCL (ref 0.6–4.6)
LYMPHOCYTES NFR BLD AUTO: 15 %
MCH RBC QN AUTO: 27.8 PG (ref 27–31)
MCHC RBC AUTO-ENTMCNC: 31.2 GM/DL (ref 33–36)
MCV RBC AUTO: 89.1 FL (ref 80–94)
MONOCYTES # BLD AUTO: 0.5 X10(3)/MCL (ref 0.1–1.3)
MONOCYTES NFR BLD AUTO: 7 %
NEUTROPHILS # BLD AUTO: 5.35 X10(3)/MCL (ref 1.4–7.9)
NEUTROPHILS NFR BLD AUTO: 71 %
PLATELET # BLD AUTO: 261 X10(3)/MCL (ref 130–400)
PMV BLD AUTO: 10.8 FL (ref 9.4–12.4)
POTASSIUM SERPL-SCNC: 3.6 MMOL/L (ref 3.5–5.1)
PROT SERPL-MCNC: 6.4 GM/DL (ref 6.4–8.2)
RBC # BLD AUTO: 3.96 X10(6)/MCL (ref 4.7–6.1)
SODIUM SERPL-SCNC: 143 MMOL/L (ref 136–145)
WBC # SPEC AUTO: 7.5 X10(3)/MCL (ref 4.5–11.5)

## 2020-05-29 ENCOUNTER — HISTORICAL (OUTPATIENT)
Dept: INFUSION THERAPY | Facility: HOSPITAL | Age: 75
End: 2020-05-29

## 2020-05-29 ENCOUNTER — HISTORICAL (OUTPATIENT)
Dept: PHYSICAL THERAPY | Facility: HOSPITAL | Age: 75
End: 2020-05-29

## 2020-05-30 ENCOUNTER — HISTORICAL (OUTPATIENT)
Dept: INFUSION THERAPY | Facility: HOSPITAL | Age: 75
End: 2020-05-30

## 2020-05-31 ENCOUNTER — HISTORICAL (OUTPATIENT)
Dept: INFUSION THERAPY | Facility: HOSPITAL | Age: 75
End: 2020-05-31

## 2020-06-01 ENCOUNTER — HISTORICAL (OUTPATIENT)
Dept: INFUSION THERAPY | Facility: HOSPITAL | Age: 75
End: 2020-06-01

## 2020-06-01 ENCOUNTER — HISTORICAL (OUTPATIENT)
Dept: PHYSICAL THERAPY | Facility: HOSPITAL | Age: 75
End: 2020-06-01

## 2020-06-02 ENCOUNTER — HISTORICAL (OUTPATIENT)
Dept: INFUSION THERAPY | Facility: HOSPITAL | Age: 75
End: 2020-06-02

## 2020-06-02 ENCOUNTER — HISTORICAL (OUTPATIENT)
Dept: ADMINISTRATIVE | Facility: HOSPITAL | Age: 75
End: 2020-06-02

## 2020-06-03 ENCOUNTER — HISTORICAL (OUTPATIENT)
Dept: INFUSION THERAPY | Facility: HOSPITAL | Age: 75
End: 2020-06-03

## 2020-06-04 ENCOUNTER — HISTORICAL (OUTPATIENT)
Dept: INFUSION THERAPY | Facility: HOSPITAL | Age: 75
End: 2020-06-04

## 2020-06-04 LAB
ABS NEUT (OLG): 12.12 X10(3)/MCL (ref 2.1–9.2)
ALBUMIN SERPL-MCNC: 3.3 GM/DL (ref 3.4–5)
ALBUMIN/GLOB SERPL: 1.2 RATIO (ref 1.1–2)
ALP SERPL-CCNC: 97 UNIT/L (ref 46–116)
ALT SERPL-CCNC: 24 UNIT/L (ref 12–78)
AST SERPL-CCNC: 26 UNIT/L (ref 15–37)
BASOPHILS # BLD AUTO: 0 X10(3)/MCL (ref 0–0.2)
BASOPHILS NFR BLD AUTO: 0 %
BILIRUB SERPL-MCNC: 1.7 MG/DL (ref 0.2–1)
BILIRUBIN DIRECT+TOT PNL SERPL-MCNC: 0.68 MG/DL (ref 0–0.2)
BILIRUBIN DIRECT+TOT PNL SERPL-MCNC: 1.02 MG/DL (ref 0–0.8)
BUN SERPL-MCNC: 30.7 MG/DL (ref 7–18)
CALCIUM SERPL-MCNC: 7.7 MG/DL (ref 8.5–10.1)
CHLORIDE SERPL-SCNC: 102 MMOL/L (ref 98–107)
CK SERPL-CCNC: 186 UNIT/L (ref 26–308)
CO2 SERPL-SCNC: 21.1 MMOL/L (ref 21–32)
CREAT SERPL-MCNC: 1.8 MG/DL (ref 0.6–1.3)
CRP SERPL-MCNC: >12 MG/DL (ref 0–0.9)
EOSINOPHIL # BLD AUTO: 0 X10(3)/MCL (ref 0–0.9)
EOSINOPHIL NFR BLD AUTO: 0 %
ERYTHROCYTE [DISTWIDTH] IN BLOOD BY AUTOMATED COUNT: 16 % (ref 11.5–17)
ERYTHROCYTE [SEDIMENTATION RATE] IN BLOOD: 41 MM/HR (ref 0–15)
GLOBULIN SER-MCNC: 2.8 GM/DL (ref 2.4–3.5)
GLUCOSE SERPL-MCNC: 131 MG/DL (ref 74–106)
HCT VFR BLD AUTO: 36 % (ref 42–52)
HGB BLD-MCNC: 11.4 GM/DL (ref 14–18)
IMM GRANULOCYTES # BLD AUTO: 0.02 % (ref 0–0.02)
IMM GRANULOCYTES NFR BLD AUTO: 0.2 % (ref 0–0.43)
LYMPHOCYTES # BLD AUTO: 0.3 X10(3)/MCL (ref 0.6–4.6)
LYMPHOCYTES NFR BLD AUTO: 2 %
MCH RBC QN AUTO: 27.9 PG (ref 27–31)
MCHC RBC AUTO-ENTMCNC: 31.7 GM/DL (ref 33–36)
MCV RBC AUTO: 88.2 FL (ref 80–94)
MONOCYTES # BLD AUTO: 0.8 X10(3)/MCL (ref 0.1–1.3)
MONOCYTES NFR BLD AUTO: 6 %
NEUTROPHILS # BLD AUTO: 12.12 X10(3)/MCL (ref 1.4–7.9)
NEUTROPHILS NFR BLD AUTO: 91 %
PLATELET # BLD AUTO: 144 X10(3)/MCL (ref 130–400)
PMV BLD AUTO: 12.7 FL (ref 9.4–12.4)
POTASSIUM SERPL-SCNC: 3 MMOL/L (ref 3.5–5.1)
PROT SERPL-MCNC: 6.1 GM/DL (ref 6.4–8.2)
RBC # BLD AUTO: 4.08 X10(6)/MCL (ref 4.7–6.1)
SODIUM SERPL-SCNC: 138 MMOL/L (ref 136–145)
WBC # SPEC AUTO: 13.2 X10(3)/MCL (ref 4.5–11.5)

## 2020-07-08 ENCOUNTER — HISTORICAL (OUTPATIENT)
Dept: LAB | Facility: HOSPITAL | Age: 75
End: 2020-07-08

## 2020-07-08 LAB
ABS NEUT (OLG): 6.57 X10(3)/MCL (ref 2.1–9.2)
ALBUMIN SERPL-MCNC: 3.9 GM/DL (ref 3.4–5)
ALBUMIN/GLOB SERPL: 1.3 RATIO (ref 1.1–2)
ALP SERPL-CCNC: 99 UNIT/L (ref 46–116)
ALT SERPL-CCNC: 23 UNIT/L (ref 12–78)
AST SERPL-CCNC: 24 UNIT/L (ref 15–37)
BILIRUB SERPL-MCNC: 0.7 MG/DL (ref 0.2–1)
BILIRUBIN DIRECT+TOT PNL SERPL-MCNC: 0.26 MG/DL (ref 0–0.2)
BILIRUBIN DIRECT+TOT PNL SERPL-MCNC: 0.44 MG/DL (ref 0–0.8)
BUN SERPL-MCNC: 16.4 MG/DL (ref 7–18)
CALCIUM SERPL-MCNC: 9.3 MG/DL (ref 8.5–10.1)
CHLORIDE SERPL-SCNC: 104 MMOL/L (ref 98–107)
CO2 SERPL-SCNC: 22.4 MMOL/L (ref 21–32)
CREAT SERPL-MCNC: 1.43 MG/DL (ref 0.6–1.3)
CRP SERPL-MCNC: 2.5 MG/DL (ref 0–0.9)
ERYTHROCYTE [DISTWIDTH] IN BLOOD BY AUTOMATED COUNT: 15.1 % (ref 11.5–17)
ERYTHROCYTE [SEDIMENTATION RATE] IN BLOOD: 42 MM/HR (ref 0–15)
GLOBULIN SER-MCNC: 3.1 GM/DL (ref 2.4–3.5)
GLUCOSE SERPL-MCNC: 164 MG/DL (ref 74–106)
HCT VFR BLD AUTO: 37.7 % (ref 42–52)
HGB BLD-MCNC: 12 GM/DL (ref 14–18)
MCH RBC QN AUTO: 27.8 PG (ref 27–31)
MCHC RBC AUTO-ENTMCNC: 31.8 GM/DL (ref 33–36)
MCV RBC AUTO: 87.3 FL (ref 80–94)
PLATELET # BLD AUTO: 229 X10(3)/MCL (ref 130–400)
PMV BLD AUTO: 10 FL (ref 9.4–12.4)
POTASSIUM SERPL-SCNC: 3.4 MMOL/L (ref 3.5–5.1)
PROT SERPL-MCNC: 7 GM/DL (ref 6.4–8.2)
RBC # BLD AUTO: 4.32 X10(6)/MCL (ref 4.7–6.1)
SODIUM SERPL-SCNC: 142 MMOL/L (ref 136–145)
WBC # SPEC AUTO: 8.9 X10(3)/MCL (ref 4.5–11.5)

## 2020-08-13 ENCOUNTER — HISTORICAL (OUTPATIENT)
Dept: LAB | Facility: HOSPITAL | Age: 75
End: 2020-08-13

## 2020-08-13 LAB
ABS NEUT (OLG): 5.05 X10(3)/MCL (ref 2.1–9.2)
ALBUMIN SERPL-MCNC: 3.9 GM/DL (ref 3.4–5)
ALBUMIN/GLOB SERPL: 1.3 RATIO (ref 1.1–2)
ALP SERPL-CCNC: 89 UNIT/L (ref 46–116)
ALT SERPL-CCNC: 17 UNIT/L (ref 12–78)
AST SERPL-CCNC: 18 UNIT/L (ref 15–37)
BILIRUB SERPL-MCNC: 0.6 MG/DL (ref 0.2–1)
BILIRUBIN DIRECT+TOT PNL SERPL-MCNC: 0.19 MG/DL (ref 0–0.2)
BILIRUBIN DIRECT+TOT PNL SERPL-MCNC: 0.41 MG/DL (ref 0–0.8)
BUN SERPL-MCNC: 20.4 MG/DL (ref 7–18)
CALCIUM SERPL-MCNC: 9.5 MG/DL (ref 8.5–10.1)
CHLORIDE SERPL-SCNC: 105 MMOL/L (ref 98–107)
CO2 SERPL-SCNC: 26.1 MMOL/L (ref 21–32)
CREAT SERPL-MCNC: 1.23 MG/DL (ref 0.6–1.3)
CRP SERPL-MCNC: 1.7 MG/DL (ref 0–0.9)
ERYTHROCYTE [DISTWIDTH] IN BLOOD BY AUTOMATED COUNT: 14.7 % (ref 11.5–17)
ERYTHROCYTE [SEDIMENTATION RATE] IN BLOOD: 29 MM/HR (ref 0–15)
GLOBULIN SER-MCNC: 3.1 GM/DL (ref 2.4–3.5)
GLUCOSE SERPL-MCNC: 130 MG/DL (ref 74–106)
HCT VFR BLD AUTO: 39.6 % (ref 42–52)
HGB BLD-MCNC: 12.6 GM/DL (ref 14–18)
MCH RBC QN AUTO: 27.3 PG (ref 27–31)
MCHC RBC AUTO-ENTMCNC: 31.8 GM/DL (ref 33–36)
MCV RBC AUTO: 85.9 FL (ref 80–94)
PLATELET # BLD AUTO: 235 X10(3)/MCL (ref 130–400)
PMV BLD AUTO: 9.5 FL (ref 9.4–12.4)
POTASSIUM SERPL-SCNC: 3.6 MMOL/L (ref 3.5–5.1)
PROT SERPL-MCNC: 7 GM/DL (ref 6.4–8.2)
RBC # BLD AUTO: 4.61 X10(6)/MCL (ref 4.7–6.1)
SODIUM SERPL-SCNC: 142 MMOL/L (ref 136–145)
WBC # SPEC AUTO: 7.3 X10(3)/MCL (ref 4.5–11.5)

## 2020-10-16 ENCOUNTER — HISTORICAL (OUTPATIENT)
Dept: LAB | Facility: HOSPITAL | Age: 75
End: 2020-10-16

## 2020-10-16 LAB
CRP SERPL-MCNC: 5.9 MG/DL (ref 0–0.9)
ERYTHROCYTE [DISTWIDTH] IN BLOOD BY AUTOMATED COUNT: 15.6 % (ref 11.5–17)
ERYTHROCYTE [SEDIMENTATION RATE] IN BLOOD: 40 MM/HR (ref 0–15)
HCT VFR BLD AUTO: 43.1 % (ref 42–52)
HGB BLD-MCNC: 13.3 GM/DL (ref 14–18)
MCH RBC QN AUTO: 27 PG (ref 27–31)
MCHC RBC AUTO-ENTMCNC: 30.9 GM/DL (ref 33–36)
MCV RBC AUTO: 87.6 FL (ref 80–94)
PLATELET # BLD AUTO: 219 X10(3)/MCL (ref 130–400)
PMV BLD AUTO: 10.2 FL (ref 9.4–12.4)
RBC # BLD AUTO: 4.92 X10(6)/MCL (ref 4.7–6.1)
WBC # SPEC AUTO: 8.2 X10(3)/MCL (ref 4.5–11.5)

## 2020-11-09 ENCOUNTER — HISTORICAL (OUTPATIENT)
Dept: LAB | Facility: HOSPITAL | Age: 75
End: 2020-11-09

## 2020-11-09 LAB
CRP SERPL-MCNC: 1 MG/DL (ref 0–0.9)
ERYTHROCYTE [DISTWIDTH] IN BLOOD BY AUTOMATED COUNT: 15.3 % (ref 11.5–17)
ERYTHROCYTE [SEDIMENTATION RATE] IN BLOOD: 24 MM/HR (ref 0–15)
HCT VFR BLD AUTO: 44.5 % (ref 42–52)
HGB BLD-MCNC: 13.6 GM/DL (ref 14–18)
MCH RBC QN AUTO: 26.8 PG (ref 27–31)
MCHC RBC AUTO-ENTMCNC: 30.6 GM/DL (ref 33–36)
MCV RBC AUTO: 87.6 FL (ref 80–94)
PLATELET # BLD AUTO: 208 X10(3)/MCL (ref 130–400)
PMV BLD AUTO: 10 FL (ref 9.4–12.4)
RBC # BLD AUTO: 5.08 X10(6)/MCL (ref 4.7–6.1)
WBC # SPEC AUTO: 7.6 X10(3)/MCL (ref 4.5–11.5)

## 2020-12-02 ENCOUNTER — HISTORICAL (OUTPATIENT)
Dept: LAB | Facility: HOSPITAL | Age: 75
End: 2020-12-02

## 2020-12-02 LAB — PSA SERPL-MCNC: 0.1 NG/ML

## 2021-02-03 ENCOUNTER — HISTORICAL (OUTPATIENT)
Dept: LAB | Facility: HOSPITAL | Age: 76
End: 2021-02-03

## 2021-02-03 LAB
ALBUMIN SERPL-MCNC: 3.9 GM/DL (ref 3.4–4.8)
ALBUMIN/GLOB SERPL: 1.3 RATIO (ref 1.1–2)
ALP SERPL-CCNC: 79 UNIT/L (ref 40–150)
ALT SERPL-CCNC: 14 UNIT/L (ref 0–55)
AST SERPL-CCNC: 15 UNIT/L (ref 5–34)
BILIRUB SERPL-MCNC: 0.8 MG/DL
BILIRUBIN DIRECT+TOT PNL SERPL-MCNC: 0.3 MG/DL (ref 0–0.5)
BILIRUBIN DIRECT+TOT PNL SERPL-MCNC: 0.5 MG/DL (ref 0–0.8)
BUN SERPL-MCNC: 17 MG/DL (ref 8.4–25.7)
CALCIUM SERPL-MCNC: 9.2 MG/DL (ref 8.8–10)
CHLORIDE SERPL-SCNC: 108 MMOL/L (ref 98–107)
CO2 SERPL-SCNC: 23 MMOL/L (ref 23–31)
CREAT SERPL-MCNC: 1.27 MG/DL (ref 0.73–1.18)
CRP SERPL-MCNC: 1 MG/DL (ref 0–1)
ERYTHROCYTE [DISTWIDTH] IN BLOOD BY AUTOMATED COUNT: 15.3 % (ref 11.5–17)
ERYTHROCYTE [SEDIMENTATION RATE] IN BLOOD: 30 MM/HR (ref 0–15)
GLOBULIN SER-MCNC: 3.1 GM/DL (ref 2.4–3.5)
GLUCOSE SERPL-MCNC: 125 MG/DL (ref 82–115)
HCT VFR BLD AUTO: 43.8 % (ref 42–52)
HGB BLD-MCNC: 13.7 GM/DL (ref 14–18)
MCH RBC QN AUTO: 27.4 PG (ref 27–31)
MCHC RBC AUTO-ENTMCNC: 31.3 GM/DL (ref 33–36)
MCV RBC AUTO: 87.6 FL (ref 80–94)
PLATELET # BLD AUTO: 205 X10(3)/MCL (ref 130–400)
PMV BLD AUTO: 10.7 FL (ref 9.4–12.4)
POTASSIUM SERPL-SCNC: 3.7 MMOL/L (ref 3.5–5.1)
PROT SERPL-MCNC: 7 GM/DL (ref 5.8–7.6)
RBC # BLD AUTO: 5 X10(6)/MCL (ref 4.7–6.1)
SODIUM SERPL-SCNC: 142 MMOL/L (ref 136–145)
WBC # SPEC AUTO: 6.8 X10(3)/MCL (ref 4.5–11.5)

## 2021-03-01 ENCOUNTER — HISTORICAL (OUTPATIENT)
Dept: LAB | Facility: HOSPITAL | Age: 76
End: 2021-03-01

## 2021-03-01 LAB
CHOLEST SERPL-MCNC: 158 MG/DL
CHOLEST/HDLC SERPL: 5 {RATIO} (ref 0–5)
EST. AVERAGE GLUCOSE BLD GHB EST-MCNC: 154.2 MG/DL
HBA1C MFR BLD: 7 %
HDLC SERPL-MCNC: 29 MG/DL (ref 35–60)
LDLC SERPL CALC-MCNC: 107 MG/DL (ref 50–140)
TRIGL SERPL-MCNC: 111 MG/DL (ref 34–140)
VLDLC SERPL CALC-MCNC: 22 MG/DL

## 2021-08-31 ENCOUNTER — HISTORICAL (OUTPATIENT)
Dept: LAB | Facility: HOSPITAL | Age: 76
End: 2021-08-31

## 2021-08-31 LAB
ALBUMIN SERPL-MCNC: 4 GM/DL (ref 3.4–4.8)
ALBUMIN/GLOB SERPL: 1.4 RATIO (ref 1.1–2)
ALP SERPL-CCNC: 68 UNIT/L (ref 40–150)
ALT SERPL-CCNC: 15 UNIT/L (ref 0–55)
AST SERPL-CCNC: 17 UNIT/L (ref 5–34)
BILIRUB SERPL-MCNC: 1.2 MG/DL
BILIRUBIN DIRECT+TOT PNL SERPL-MCNC: 0.5 MG/DL (ref 0–0.5)
BILIRUBIN DIRECT+TOT PNL SERPL-MCNC: 0.7 MG/DL (ref 0–0.8)
BUN SERPL-MCNC: 17.5 MG/DL (ref 8.4–25.7)
CALCIUM SERPL-MCNC: 9.2 MG/DL (ref 8.8–10)
CHLORIDE SERPL-SCNC: 104 MMOL/L (ref 98–107)
CHOLEST SERPL-MCNC: 99 MG/DL
CHOLEST/HDLC SERPL: 3 {RATIO} (ref 0–5)
CO2 SERPL-SCNC: 22 MMOL/L (ref 23–31)
CREAT SERPL-MCNC: 1.48 MG/DL (ref 0.73–1.18)
CREAT UR-MCNC: 93.6 MG/DL (ref 58–161)
EST. AVERAGE GLUCOSE BLD GHB EST-MCNC: 165.7 MG/DL
GLOBULIN SER-MCNC: 2.8 GM/DL (ref 2.4–3.5)
GLUCOSE SERPL-MCNC: 158 MG/DL (ref 82–115)
HBA1C MFR BLD: 7.4 %
HDLC SERPL-MCNC: 33 MG/DL (ref 35–60)
LDLC SERPL CALC-MCNC: 49 MG/DL (ref 50–140)
MICROALBUMIN UR-MCNC: 5.7 UG/ML
MICROALBUMIN/CREAT RATIO PNL UR: 6.1 MG/GM CR (ref 0–30)
POTASSIUM SERPL-SCNC: 4 MMOL/L (ref 3.5–5.1)
PROT SERPL-MCNC: 6.8 GM/DL (ref 5.8–7.6)
SODIUM SERPL-SCNC: 138 MMOL/L (ref 136–145)
TRIGL SERPL-MCNC: 87 MG/DL (ref 34–140)
VLDLC SERPL CALC-MCNC: 17 MG/DL

## 2021-10-13 LAB
LEFT EYE DM RETINOPATHY: NEGATIVE
RIGHT EYE DM RETINOPATHY: NEGATIVE

## 2021-11-11 ENCOUNTER — HISTORICAL (OUTPATIENT)
Dept: ADMINISTRATIVE | Facility: HOSPITAL | Age: 76
End: 2021-11-11

## 2021-12-02 ENCOUNTER — HISTORICAL (OUTPATIENT)
Dept: ADMINISTRATIVE | Facility: HOSPITAL | Age: 76
End: 2021-12-02

## 2022-01-12 LAB
LEFT EYE DM RETINOPATHY: NEGATIVE
RIGHT EYE DM RETINOPATHY: NEGATIVE

## 2022-03-07 ENCOUNTER — HISTORICAL (OUTPATIENT)
Dept: LAB | Facility: HOSPITAL | Age: 77
End: 2022-03-07

## 2022-03-07 LAB
ABS NEUT (OLG): 4.66 (ref 2.1–9.2)
ALBUMIN SERPL-MCNC: 4.1 G/DL (ref 3.4–4.8)
ALBUMIN/GLOB SERPL: 1.1 {RATIO} (ref 1.1–2)
ALP SERPL-CCNC: 75 U/L (ref 40–150)
ALT SERPL-CCNC: 16 U/L (ref 0–55)
AST SERPL-CCNC: 16 U/L (ref 5–34)
BASOPHILS # BLD AUTO: 0 10*3/UL (ref 0–0.2)
BASOPHILS NFR BLD AUTO: 0 %
BILIRUB SERPL-MCNC: 1.3 MG/DL
BILIRUBIN DIRECT+TOT PNL SERPL-MCNC: 0.5 (ref 0–0.5)
BILIRUBIN DIRECT+TOT PNL SERPL-MCNC: 0.8 (ref 0–0.8)
BUN SERPL-MCNC: 21.2 MG/DL (ref 8.4–25.7)
CALCIUM SERPL-MCNC: 9.8 MG/DL (ref 8.7–10.5)
CHLORIDE SERPL-SCNC: 103 MMOL/L (ref 98–107)
CHOLEST SERPL-MCNC: 173 MG/DL
CHOLEST/HDLC SERPL: 5 {RATIO} (ref 0–5)
CO2 SERPL-SCNC: 24 MMOL/L (ref 23–31)
CREAT SERPL-MCNC: 1.53 MG/DL (ref 0.73–1.18)
EOSINOPHIL # BLD AUTO: 0.1 10*3/UL (ref 0–0.9)
EOSINOPHIL NFR BLD AUTO: 2 %
ERYTHROCYTE [DISTWIDTH] IN BLOOD BY AUTOMATED COUNT: 14.7 % (ref 11.5–17)
EST. AVERAGE GLUCOSE BLD GHB EST-MCNC: 171.4 MG/DL
GLOBULIN SER-MCNC: 3.6 G/DL (ref 2.4–3.5)
GLUCOSE SERPL-MCNC: 144 MG/DL (ref 82–115)
HBA1C MFR BLD: 7.6 %
HCT VFR BLD AUTO: 48.4 % (ref 42–52)
HDLC SERPL-MCNC: 34 MG/DL (ref 35–60)
HEMOLYSIS INTERF INDEX SERPL-ACNC: 4
HGB BLD-MCNC: 15.3 G/DL (ref 14–18)
ICTERIC INTERF INDEX SERPL-ACNC: 1
IMM GRANULOCYTES # BLD AUTO: 0.01 10*3/UL (ref 0–0.02)
IMM GRANULOCYTES NFR BLD AUTO: 0.2 % (ref 0–0.43)
LDLC SERPL CALC-MCNC: 123 MG/DL (ref 50–140)
LIPEMIC INTERF INDEX SERPL-ACNC: 0
LYMPHOCYTES # BLD AUTO: 1.3 10*3/UL (ref 0.6–4.6)
LYMPHOCYTES NFR BLD AUTO: 20 %
MANUAL DIFF? (OHS): NO
MCH RBC QN AUTO: 27.1 PG (ref 27–31)
MCHC RBC AUTO-ENTMCNC: 31.6 G/DL (ref 33–36)
MCV RBC AUTO: 85.8 FL (ref 80–94)
MONOCYTES # BLD AUTO: 0.4 10*3/UL (ref 0.1–1.3)
MONOCYTES NFR BLD AUTO: 6 %
NEUTROPHILS # BLD AUTO: 4.66 10*3/UL (ref 1.4–7.9)
NEUTROPHILS NFR BLD AUTO: 71 %
PLATELET # BLD AUTO: 191 10*3/UL (ref 130–400)
PMV BLD AUTO: 10 FL (ref 9.4–12.4)
POTASSIUM SERPL-SCNC: 4.1 MMOL/L (ref 3.5–5.1)
PROT SERPL-MCNC: 7.7 G/DL (ref 5.8–7.6)
PSA SERPL-MCNC: 0.1 NG/ML
RBC # BLD AUTO: 5.64 10*6/UL (ref 4.7–6.1)
SODIUM SERPL-SCNC: 139 MMOL/L (ref 136–145)
TRIGL SERPL-MCNC: 82 MG/DL (ref 34–140)
VLDLC SERPL CALC-MCNC: 16 MG/DL
WBC # SPEC AUTO: 6.6 10*3/UL (ref 4.5–11.5)

## 2022-04-10 ENCOUNTER — HISTORICAL (OUTPATIENT)
Dept: ADMINISTRATIVE | Facility: HOSPITAL | Age: 77
End: 2022-04-10

## 2022-04-27 VITALS
BODY MASS INDEX: 42.66 KG/M2 | SYSTOLIC BLOOD PRESSURE: 135 MMHG | HEIGHT: 72 IN | OXYGEN SATURATION: 98 % | WEIGHT: 315 LBS | DIASTOLIC BLOOD PRESSURE: 78 MMHG

## 2022-04-30 NOTE — OP NOTE
Patient:   Chris Mendoza II            MRN: [id number]            FIN: [id number]               Age:   74 years     Sex:  Male     :  1945   Associated Diagnoses:   Failed total hip arthroplasty   Author:   Jose Luna MD      Operative Note   Operative Information   Date/ Time:  10/15/2018 07:38:00.     Procedures Performed: Right hip aspiration using fluoroscopy.     Preoperative Diagnosis: Failed total hip arthroplasty ([id number] T84.018A).     Postoperative Diagnosis: Failed total hip arthroplasty ([id number]-JA T84.018A).     Surgeon: Jose Luna MD.     Anesthesia: concious sedation.     Description of Procedure/Findings/    Complications: Patient was involved who complains of ongoing Left hip pain.  We discussed all treatment options.  Patient has tried and failed all measures.  Pre-op lab work was obtained with elevated inflammatory markers.  Given then, aspiration was discussed to rule out infectious etiology.  The risk, benefits, alternatives to aspiration of the left hip under anesthesia were discussed in detail including but limited to infection, bleeding, scarring, need for revision surgery, and resolution of pain.  Despite these risks patient elected to proceed with surgical mention.    Patient seen in preoperative holding.  The risk benefits alternatives again discussed.  All questions answered no guarantees made.  Patient was marked and consented.  Was taken the operating room.  placed under conscious sedation.  Using fluoroscopic guidance, the insertion site was localized.  It was injected with 3-4 cc 1% lidocaine.  Afterwards an 18-gauge spinal needle was then inserted using fluoroscopic guidance to the superior anterior aspect of the left hip/acetabulum.  2-3 cc of Isovue contrast was then injected.  We had an excellent arthrogram.  Afterwards, 5 cc of fluid was aspiration.  This fluid will be sent for cell count, cultures.  Needle was removed.  Band-Aid was placed over the  injection site.  Patient arose from anesthesia without issue.  Was transferred to recovery room in stable condition. postop he will be weightbearing as tolerated.  Patient will be discharged home.  .     Esimated blood loss: loss  5  cc.

## 2022-04-30 NOTE — OP NOTE
Patient:   Chris Mendoza II            MRN: [id number]            FIN: [id number]               Age:   76 years     Sex:  Male     :  1945   Associated Diagnoses:   None   Author:   Marco HOBBS MD, Ricardo R      Pre-op Dx:  Cataract of the Left eye    Post-op Dx:  Cataract of the Left eye     Procedure:  Cataract extraction by phacoemulsification with an IOL    Anes:   Topical    Complications: None    Procedure in detail:   Dilating drops were given in the holding area.  The patient was brought into the surgical suite, identified and the correct eye confirmed.  Topical anesthesia was applied.  The eye was then prepped and draped in a sterile fashion.  A supersharp blade was used to make a paracentesis at the 5 oclock position. 1/2 cc of 2% lidocaine + 1/2 cc BSS was injected into AC thru cannula. Viscoelastic was placed in the anterior chamber.  A clear corneal incision was made at the 3 oclock position with a keratome blade.  Next, a cystatome and utrata forceps were used to make and remove a 360 degree capsulorrhexis. BSS through a cannula was used to hydro dissect and rotate the lens material from the capsule. The phaco handpiece was placed into the anterior chamber and the lens removed in a divide and conquer fashion.  The remaining cortex was removed with the I & A handpiece.  Viscoelastic was placed into the capsular bag, which remained clear and intact.  An  IOL was placed in the bag and rotated into position.  The remaining viscoelastic was removed with the I &A handpiece.  The incision was hydrated with BSS and checked for leakage.  No leakage was found.  The drapes were removed and antibiotic drops were placed into the eye.  The patient tolerated the procedure well and was moved back to the holding room.  Sunglasses and instructions were personally given to the patient and family.  The patient will follow-up at my office tomorrow.      EFX 54    17.5 ZCBOO IOL        Kenyon Lara II, M.D.

## 2022-04-30 NOTE — OP NOTE
Patient:   Chris Mendoza II            MRN: [id number]            FIN: [id number]               Age:   71 years     Sex:  Male     :  1945   Associated Diagnoses:   None   Author:   Max Sheppard MD      Cardiologist: Dr. Max Sheppard    Assistant: _    Preoperative Diagnosis(es):  [ X ] Artial fibrillation  [ _ ] Artial flutter  [ _ ] Artial tachycardia    Postoperative Diagnosis(es):  [ _ ] Normal Sinus rhythm  [ _ ] Sinus bradycardia  [ X ] Artial fibrillation  [ _ ] Sinus with third-degree atrioventricular block  [ _ ] A-V sequential pacing/capture    Procedure(s):  Electrical cardioversion    Complications:  None    Description of Procedure:  After informed consent was obtained and permit signed, the patient was transported to the Cardiac Electrophysiology Laboratory. The EKG electrodes and cardioverter / defibrillator pads were applied to the patient. Pulse oximetry and ventilation were monitored, with oxygen and ventilation assistance given as needed. Adequate sedation for the procedure was accomplished by [X]the Anesthsia service/[_] IV Versend and Fentanyl. Synchronized direct-current energy was delivered at a level of 200 joules biphasic. Additional energy was [_] not indicated/[X] indicated at 200 joules biphasic. Sinus rhythm[_]was/[X] was not restored. Upon awakening, the patient was transferred in a stable condition to a monitored bed for recovery.

## 2022-04-30 NOTE — OP NOTE
Patient:   Chris Mendoza II            MRN: [id number]            FIN: [id number]               Age:   76 years     Sex:  Male     :  1945   Associated Diagnoses:   None   Author:   Marco HOBBS MD, Jens WIGGINS      Pre-op Dx:  Cataract of the Right eye    Post-op Dx:  Cataract of the Right eye     Procedure:  Cataract extraction by phacoemulsification with an IOL    Anes:   Topical    Complications: None    Procedure in detail:   Dilating drops were given in the holding area.  The patient was brought into the surgical suite, identified and the correct eye confirmed.  Topical anesthesia was applied.  The eye was then prepped and draped in a sterile fashion.  A supersharp blade was used to make a paracentesis at the 11 oclock position. 1/2 cc of 2% lidocaine + 1/2 cc BSS was injected into AC thru cannula. Viscoelastic was placed in the anterior chamber.  A clear corneal incision was made at the 9 oclock position with a keratome blade.  Next, a cystotome and utrata forceps were used to make a 360 degree capsulorrhexis. BSS through a cannula was used to hydro dissect and rotate the lens material from the capsule. The phaco handpiece was placed into the anterior chamber and the lens removed in a divide and conquer fashion.  The remaining cortex was removed with the I & A handpiece.  Viscoelastic was placed into the capsular bag, which remained clear and intact.  An  IOL was placed in the bag and rotated into position.  The remaining viscoelastic was removed with the I &A handpiece.  The incision was hydrated with BSS and checked for leakage.  No leakage was found.  The drapes were removed and antibiotic drops were placed into the eye.  The patient tolerated the procedure well and was moved back to the holding room.  Sunglasses and instructions were personally given to the patient and family.  The patient will follow-up at my office tomorrow.     EFX 25   16.0 ZCBOO IOL        Kenyon Lara II, M.D.

## 2022-05-04 NOTE — HISTORICAL [facility] CERNER
This is a historical note converted from Lucio. Formatting and pictures may have been removed.  Please reference Lucio for original formatting and attached multimedia. Chief Complaint  Had rt hip replacement in 02/2017. States still hurting and he is having trouble sleeping  History of Present Illness  72-year-old male presents for follow-up of right hip pain. ?Patients postoperative course was complicated by acute periprosthetic infection?treated with irrigation and debridement, head and liner exchange, IV antibiotics, oral antibiotics. ?He has completed his course of oral antibiotics has been off of these for the last couple months.? Patient has complains of?slight worsening of right buttock pain over the last?3-4 weeks.? Denies any fevers chills or signs of active infection. ?His ESR and CRP?were checked by infectious disease and continue to be slightly elevated, although decreased from previous. ?They have not increased since being off of antibiotics.? Denies any significant radiation distally down his leg.? Pain is worse when he lies down at night. ?It is somewhat improved?with ambulation.? He does complain of some mild start up pain,?this does decrease?after he ambulates.  Review of Systems  Denies fevers, chills, chest pain, shortness of breath. Review of systems otherwise negative except as in HPI.  Physical Exam  Vitals & Measurements  BP:?117/77?  HT:?183?cm? HT:?183?cm? WT:?143.6?kg? WT:?143.6?kg? BMI:?42.88?  General: No acute distress, alert and oriented, healthy appearing?  HEENT: Head is atraumatic, mucous membranes are moist  Neck: Supples, no JVD  Cardiovascular: Palpable dorsalis pedis and posterior tibial pulses, regular rate and rhythm to those pulses  Lungs: Breathing non-labored  Skin: no rashes appreciated  Neurologic: Can flex and extend knees, ankles, and toes. Sensation is grossly intact  Hip exam:  Right?hip incision clean dry and intact. No erythema, drainage, or signs of infection  No  swelling distally. No signs of DVT  No pain with logroll  Sensation intact distally to right foot  Positive FHL/EHL/gastrocsoleus/tib ant brisk capillary refill right foot  Mildly positive Stinchfield. ?Positive straight leg raise to the right side.  ?  Assessment/Plan  1.?Right hip pain,? Right hip pain  ? 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 ?There does appear to be a slight radiolucency about his?acetabular component.? His ESR and CRP continue to be slightly elevated, although they have not increased after stopping antibiotics. ?He shows no overt signs of active infection currently. ?Discussed with the patient that to rule out an infection is total hip arthroplasty, we could get an aspiration of his hip.? He also has a history of lumbar stenosis is noted on an MRI?performed about?10 months ago.? He does have some buttock pain as well as?some radiculopathy in the past. ?He does not feel that this is coming from his hip and feels it is likely coming from his lower back.? Try a course of physical therapy to see if this can improve his symptoms. ?If this does improve his symptoms,?it is likely due to?lumbar spine?inflammation aggravation.? If he continues to have issues,?I would recommend?further evaluation?of?possible indolent infection to his right hip with a right hip aspiration.? We will plan to see him back in 6 weeks to see how he is doing or sooner should there be any issues.  Ordered:  Clinic Follow up, *Est. 11/07/17 10:15:00 CST, Order for future visit, Right hip pain, [facility]  Office/Outpatient Visit Level 4 Established [id number] [facility], Right hip pain, [facility], 09/21/17 15:45:00 CDT  PT/OT External Referral, 09/21/17 15:45:00 CDT, Right hip pain  Lumbar stenosis, Home Exercise program  Therapeutic Excercise  No Dry Needling, 3 X Week, Patient has IV, Standard Precautions, **LUMBAR PT ORDERS**  Lumbar  stenosis  Ordered:  PT/OT External Referral, 09/21/17 15:45:00 CDT, Right hip pain  Lumbar stenosis, Home Exercise program  Therapeutic Excercise  No Dry Needling, 3 X Week, Patient has IV, Standard Precautions, **LUMBAR PT ORDERS**   Problem List/Past Medical History  Ongoing  AF (atrial fibrillation)  Bladder cancer  CPAP (continuous positive airway pressure) ventilation weaning protocol  Diabetes  Diabetes  HTN (hypertension)  Hyperlipidemia LDL goal <130  Infection of right prosthetic hip joint  Malnutrition  MRSA  Sleep apnea  Historical  Allergic rhinosinusitis  Ankle edema  Arthritis  Atrial fibrillation and flutter  Back pain  Bladder cancer  BPH - Benign prostatic hypertrophy  Chicken pox  Hypertension  Measles  EVA - Obstructive sleep apnea  Procedure/Surgical History  Catheter placement in coronary artery(s) for coronary angiography, including intraprocedural injection(s) for coronary angiography, imaging supervision and interpretation; with left heart catheterization including intraprocedural injection(s) for left atruro (09/15/2017)  Fluoroscopy of Left Heart using Low Osmolar Contrast (09/15/2017)  Fluoroscopy of Multiple Coronary Arteries using Low Osmolar Contrast (09/15/2017)  Measurement of Cardiac Sampling and Pressure, Left Heart, Percutaneous Approach (09/15/2017)  Cardioversion (.) (07/20/2017)  Cardioversion, elective, electrical conversion of arrhythmia; external (07/20/2017)  Restoration of Cardiac Rhythm, Single (07/20/2017)  Transesophageal Echo (for Surgery) (., None) (03/01/2017)  Removal of Synthetic Substitute from Right Hip Joint, Open Approach (02/22/2017)  Replacement of Right Hip Joint with Metal on Polyethylene Synthetic Substitute, Open Approach (02/22/2017)  Total Hip Revision/Removal (Right) (02/22/2017)  [facility] Total Hip Arthroplasty (Right) (02/01/2017)  Replacement of Right Hip Joint with Synthetic Substitute, Open Approach (02/01/2017)  CLOSURE OF SKIN AND SUBCUTANEOUS  TISSUE OTHER SITES (08/19/2014)  Simple repair of superficial wounds of scalp, neck, axillae, external genitalia, trunk and/or extremities (including hands and feet); 2.6 cm to 7.5 cm. (08/19/2014)  Debridement, subcutaneous tissue (includes epidermis and dermis, if performed); first 20 sq cm or less. (10/03/2012)  Excisional debridement of wound, infection, or burn (10/03/2012)  Debridement, subcutaneous tissue (includes epidermis and dermis, if performed); first 20 sq cm or less. (09/21/2012)  Excisional debridement of wound, infection, or burn (09/21/2012)  Angiogram  sx for bladder cancer  vein surgery  Medications  aspirin 81 mg oral tablet, 81 mg= 1 tab(s), Oral, Daily  doxazosin 4 mg oral tablet, 4 mg= 1 tab(s), Oral, Daily, 3 refills  ELIQUIS 5 MG TABS, 5 mg= 1 tab(s), Oral, BID  finasteride 5 mg oral tablet, 5 mg= 1 tab(s), Oral, Daily, 3 refills  glipiZIDE 2.5 mg oral tablet, extended release (XL tab), 2.5 mg= 1 tab(s), Oral, Daily  hydrochlorothiazide-triamterene 25 mg-37.5 mg oral capsule, 2 cap(s), Oral, Daily, 3 refills  METOPROLOL SUCCINATE ER 50 MG TB24, 50 mg= 1 tab(s), Oral, BID  Allergies  Levaquin?(Hives)  Social History  Alcohol - Low Risk, 09/15/2017  Current, Beer, Daily  Employment/School - 09/15/2017  Retired, Previous employment/school: computer drafting, still playing in band.  Home/Environment - 09/15/2017  Lives with Spouse.  Tobacco - Denies Tobacco Use, 09/15/2017  Never smoker  Family History  CAD (coronary artery disease): Mother.  Heart disease.: Father.  Primary malignant neoplasm of lung: Father.  Thyroid disease.: Sister.  Diagnostic Results  AP lateral right hip taken and reviewed. ?Patients prosthesis is still in good position. ?Does have a faint radiolucent line about his acetabular component.

## 2022-05-04 NOTE — HISTORICAL [facility] CERNER
This is a historical note converted from Lucio. Formatting and pictures may have been removed.  Please reference Lucio for original formatting and attached multimedia. Chief Complaint  RIGHT COLLIN ON 2/2/17. ?HAVING ALOT OF PAIN. ?HE HAS TO TAKE PAIN MEDICATION TO TRY TO SLEEP. ?WALKING WITH A CANE.  History of Present Illness  74-year-old man presents here follow-up of right hip pain. ?Patient is total [facility] about 2 years ago.? Last time he was seen he had some late onset drainage from his wound. ?This was treated with antibiotics she had a fairly severe reaction to. ?He has stopped antibiotics?about the last 3 to 4 months. ?He is actually doing quite well with regards to his hip?although began having worsening pain. ?Describes pain when he gets up out of a chair and when he ambulates. ?Pain is located to his groin as well as his lower back.  Review of Systems  Denies fevers, chills, chest pain, shortness of breath. Comprehensive review of systems performed and otherwise negative except as noted in HPI.  Physical Exam  Vitals & Measurements  HT:?183?cm? WT:?164?kg? BMI:?48.97?  General: No acute distress, alert and oriented, healthy appearing?  HEENT: Head is atraumatic, mucous membranes are moist  Cardiovascular: Brisk capillary refill  Lungs: Breathing non-labored  Skin: no rashes appreciated  Neurologic: Sensation is grossly intact distally  ?  Right hip:?Patient does have a positive Stinchfield. ?He has no skin pain with internal or external rotation. ?Brisk capillary refill distally.  Assessment/Plan  1.?Aftercare following joint replacement?Z47.1  ?Patient with painful total hip on the right side.? Discussed all treatment options. ?Given his history, we will plan for an aspiration of his right hip. ?The risk, benefits, terms aspiration of right wrist is in detail including infection, bleeding, scarring, need revision surgery. ?Despite these risk later proceed with surgical intervention we will plan for  aspiration of his neck tip next 2 to 3 weeks.  Ordered:  Office/Outpatient Visit Level 3 Established [id number], Aftercare following joint replacement  Low back pain, [facility], 02/04/20 14:01:00 CST  PT/OT External Referral, 02/04/20 14:02:00 CST, Aftercare following joint replacement  Low back pain, Home Exercise program  Therapeutic Excercise  No Dry Needling, 3 X Week, Patient has IV, Standard Precautions, **LUMBAR PT ORDERS**  XR Hip Right 2 Views, Routine, 02/04/20 13:41:00 CST, Pain, None, Patient Bed, Patient Has IV?, Rad Type, Aftercare following joint replacement, Not Scheduled, 02/04/20 13:41:00 CST  ?  2.?Low back pain?M54.5  ?Some of the patients pain is coming for his lumbar spine was previously diagnosed with arthritis and stenosis. ?In the interim we will get him started on some physical therapy?for gait training strengthening and range of motion of his back.  Ordered:  Office/Outpatient Visit Level 3 Established [id number], Aftercare following joint replacement  [facility] 14:01[facility]00 CST  PT/OT External Referral, 02/04/20 14:02:00 CST, Aftercare following joint replacement  Low back pain, Home Exercise program  Therapeutic Excercise  No Dry Needling, 3 X Week, Patient has IV, Standard Precautions, **LUMBAR PT ORDERS**  ?  Referrals  PT/OT External Referral, 02/04/20 14:02:00 CST, Aftercare following joint replacement  Low back pain, Home Exercise program  Therapeutic Excercise  No Dry Needling, 3 X Week, Patient has IV, Standard Precautions, **LUMBAR PT ORDERS**   Problem List/Past Medical History  Ongoing  Arthritis  Chronic a-fib  CPAP (continuous positive airway pressure) ventilation weaning protocol  Diabetes  Diabetes  Gout  HTN (hypertension)  Hyperlipidemia LDL goal <130  MRSA  Obesity  PVD (peripheral vascular disease)  RLS (restless legs syndrome)  Sleep apnea  Historical  Acute kidney injury  Allergic rhinosinusitis  Ankle edema  Atrial  fibrillation and flutter  Back pain  Bladder cancer  BPH - Benign prostatic hypertrophy  Chicken pox  Hypertension  Measles  EVA - Obstructive sleep apnea  Procedure/Surgical History  Catheter placement in coronary artery(s) for coronary angiography, including intraprocedural injection(s) for coronary angiography, imaging supervision and interpretation; with left heart catheterization including intraprocedural injection(s) for left arturo (09/15/2017)  Fluoroscopy of Left Heart using Low Osmolar Contrast (09/15/2017)  Fluoroscopy of Multiple Coronary Arteries using Low Osmolar Contrast (09/15/2017)  Measurement of Cardiac Sampling and Pressure, Left Heart, Percutaneous Approach (09/15/2017)  Cardioversion (.) (07/20/2017)  Cardioversion, elective, electrical conversion of arrhythmia; external (07/20/2017)  Restoration of Cardiac Rhythm, Single (07/20/2017)  Transesophageal Echo (for Surgery) (., None) (03/01/2017)  Removal of Synthetic Substitute from Right Hip Joint, Open Approach (02/22/2017)  Replacement of Right Hip Joint with Metal on Polyethylene Synthetic Substitute, Open Approach (02/22/2017)  Total Hip Revision/Removal (Right) (02/22/2017)  DEMETRI MAIRTZA Total Hip Arthroplasty (Right) (02/01/2017)  Replacement of Right Hip Joint with Synthetic Substitute, Open Approach (02/01/2017)  CLOSURE OF SKIN AND SUBCUTANEOUS TISSUE OTHER SITES (08/19/2014)  Simple repair of superficial wounds of scalp, neck, axillae, external genitalia, trunk and/or extremities (including hands and feet); 2.6 cm to 7.5 cm. (08/19/2014)  Debridement, subcutaneous tissue (includes epidermis and dermis, if performed); first 20 sq cm or less. (10/03/2012)  Excisional debridement of wound, infection, or burn (10/03/2012)  Debridement, subcutaneous tissue (includes epidermis and dermis, if performed); first 20 sq cm or less. (09/21/2012)  Excisional debridement of wound, infection, or burn (09/21/2012)  Angiogram  sx for bladder cancer  vein  surgery   Medications  allopurinol 300 mg oral tablet, 300 mg= 1 tab(s), Oral, BID  apixaban 5 mg oral tablet, 5 mg= 1 tab(s), Oral, BID, 11 refills  ATORVASTATIN CALCIUM 10 MG TABS, 10 mg= 1 tab(s), Oral, Daily  colchicine 0.6 mg oral tablet, 0.6 mg= 1 tab(s), Oral, Daily, PRN  doxazosin 4 mg oral tablet, See Instructions, 3 refills  finasteride 5 mg oral tablet, See Instructions, 3 refills  glipiZIDE 2.5 mg oral tablet, extended release (XL tab), See Instructions  hydrochlorothiazide-triamterene 25 mg-37.5 mg oral capsule, See Instructions, 3 refills  hydrOXYzine hydrochloride 25 mg oral tablet, 25 mg= 1 tab(s), Oral, QID, PRN,? ?Not taking: prn Last Dose Date/Time Unknown  indomethacin 50 mg oral capsule, 50 mg= 1 cap(s), Oral, TID  metoprolol succinate 50 mg oral tablet extended release, 50 mg= 1 tab(s), Oral, BID, 3 refills  Mirapex 0.125 mg oral tablet, 0.125 mg= 1 tab(s), Oral, At Bedtime, 5 refills  nasal cpap mask, See Instructions  Singulair 10 mg oral TABLET, 10 mg= 1 tab(s), Oral, qPM, 5 refills,? ?Not taking: prn Last Dose Date/Time Unknown  Ultram 50 mg oral tablet, 50 mg= 1 tab(s), Oral, q12hr, PRN,? 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  Heart disease.: Father.  Primary malignant neoplasm of lung: Father.  Thyroid disease.: Sister.  Immunizations  Vaccine Date Status Comments   influenza virus vaccine, inactivated 10/09/2019 Given    pneumococcal 13-valent  conjugate vaccine 01/24/2019 Given    influenza virus vaccine, inactivated 10/18/2018 Recorded    influenza virus vaccine, inactivated 09/21/2017 Recorded [facility]   influenza virus vaccine, inactivated 10/06/2016 Recorded    influenza virus vaccine, inactivated 11/03/2015 Recorded [facility]   pneumococcal 23-polyvalent vaccine 01/13/2015 Recorded    influenza virus vaccine, inactivated 09/25/2014 Recorded    tetanus/diphtheria/pertussis, acel(Tdap) 08/19/2014 Given    influenza virus vaccine, inactivated 10/05/2013 Recorded    influenza virus vaccine, inactivated 11/27/2012 Recorded    zoster vaccine live 11/27/2012 Recorded    Health Maintenance  Health Maintenance  ???Pending?(in the next year)  ??? ??OverDue  ??? ? ? ?Pneumococcal Vaccine due??and every?  ??? ??Due?  ??? ? ? ?Colorectal Screening ([facility]) due??02/04/20??and every?  ??? ? ? ?Diabetes Maintenance-Eye Exam due??02/04/20??and every?  ??? ??Due In Future?  ??? ? ? ?ADL Screening not due until??10/03/20??and every 1??year(s)  ??? ? ? ?Advance Directive not due until??01/01/21??and every 1??year(s)  ??? ? ? ?Alcohol Misuse Screening not due until??01/01/21??and every 1??year(s)  ??? ? ? ?Cognitive Screening not due until??01/01/21??and every 1??year(s)  ??? ? ? ?Fall Risk Assessment not due until??01/01/21??and every 1??year(s)  ??? ? ? ?Functional Assessment not due until??01/01/21??and every 1??year(s)  ??? ? ? ?Geriatric Depression Screening not due until??01/01/21??and every 1??year(s)  ??? ? ? ?Obesity Screening not due until??01/01/21??and every 1??year(s)  ??? ? ? ?Diabetes Maintenance-Fasting Lipid Profile not due until??01/28/21??and every 1??year(s)  ??? ? ? ?Diabetes Maintenance-Foot Exam not due until??01/28/21??and every 1??year(s)  ??? ? ? ?Diabetes Maintenance-HgbA1c not due until??01/28/21??and every 1??year(s)  ??? ? ? ?Hypertension Management-BMP not due until??01/28/21??and every 1??year(s)  ??? ? ? ?Hypertension  Management-Blood Pressure not due until??01/28/21??and every 1??year(s)  ??? ? ? ?Aspirin Therapy for CVD Prevention not due until??01/29/21??and every 1??year(s)  ???Satisfied?(in the past 1 year)  ??? ??Satisfied?  ??? ? ? ?ADL Screening on??10/03/19.??Satisfied by Yamilka Lentz RN  ??? ? ? ?Advance Directive on??01/29/20.??Satisfied by Sruthi STAPLETON Soni  ??? ? ? ?Alcohol Misuse Screening on??01/29/20.??Satisfied by Sruthi STAPLETON, Soni  ??? ? ? ?Aspirin Therapy for CVD Prevention on??01/29/20.??Satisfied by Shobha Chris  ??? ? ? ?Blood Pressure Screening on??01/29/20.??Satisfied by Sruthi STAPLETON, Soni  ??? ? ? ?Body Mass Index Check on??02/04/20.??Satisfied by María Elena Flor  ??? ? ? ?Cognitive Screening on??01/29/20.??Satisfied by Benmichelle STAPLETON, Soni  ??? ? ? ?Coronary Artery Disease Maintenance-Lipid Lowering Therapy on??10/02/19.??Satisfied by Jennie Whitman RN  ??? ? ? ?Depression Screening on??01/29/20.??Satisfied by Sruthi STAPLETON, Soni  ??? ? ? ?Diabetes Maintenance-Foot Exam on??01/29/20.??Satisfied by Shobha Chris  ??? ? ? ?Diabetes Maintenance-Fasting Lipid Profile on??01/29/20.??Satisfied by Contributor_system, [facility]  ??? ? ? ?Diabetes Maintenance-HgbA1c on??01/29/20.??Satisfied by Sruthi STAPLETON Soni  ??? ? ? ?Diabetes Screening on??10/02/19.??Satisfied by KATELYN GOODWIN  ??? ? ? ?Fall Risk Assessment on??01/29/20.??Satisfied by Benintende LPN, Soni  ??? ? ? ?Functional Assessment on??01/29/20.??Satisfied by Valentinaende DYAA, Soni  ??? ? ? ?Geriatric Depression Screening on??01/29/20.??Satisfied by Valentinaende DAYA, Soni  ??? ? ? ?Hypertension Management-BMP on??01/29/20.??Satisfied by Contributor[facility]system, [facility]  ??? ? ? ?Hypertension Management-Blood Pressure on??01/29/20.??Satisfied by Sruthi STAPLETON, Soni  ??? ? ? ?Influenza Vaccine on??10/09/19.??Satisfied by Ante DAYA Soni  ??? ? ? ?Lipid Screening  on??01/29/20.??Satisfied by Contributor[facility]system, [facility]  ??? ? ? ?Obesity Screening on??02/04/20.??Satisfied by María Elena Flor  ??? ? ? ?Smoking Cessation (Coronary Artery Disease) on??10/03/19.??Satisfied by Yamilka Lentz RN  ??? ? ? ?Smoking Cessation (Diabetes) on??10/03/19.??Satisfied by Yamilka Lentz RN  ??? ??Refused?  ??? ? ? ?Colorectal Screening ([facility]) on??01/29/20.??Recorded by Shobha Chris  ??? ? ? ?Diabetes Maintenance-Foot Exam on??07/24/19.??Recorded by Shobha Chris??Reason: Patient Refuses  ?  Diagnostic Results  AP lateral right hip reviewed. ?Patients implants have not changed position. ?There is radiolucency around his acetabular component.

## 2022-05-04 NOTE — HISTORICAL [facility] CERNER
This is a historical note converted from Lucio. Formatting and pictures may have been removed.  Please reference Lucio for original formatting and attached multimedia. Chief Complaint  f/u rt total hip revision, pt ambulating with walker. wb 50% on rt leg...cv  History of Present Illness  74-year-old male presents here follow-up of revision of his right hip. ?Overall the patient is doing fairly well. ?Pain is controlled. ?Has maintaining?50% weightbearing.? On IV antibiotics currently  Review of Systems  Denies fevers, chills, chest pain, shortness of breath. Comprehensive review of systems performed and otherwise negative except as noted in HPI.  Physical Exam  Vitals & Measurements  T:?98.2? ?F (Oral)? HR:?83(Peripheral)? BP:?124/73?  HT:?184?cm? WT:?164.20?kg? BMI:?48.5?  General: No acute distress, alert and oriented, healthy appearing?  HEENT: Head is atraumatic, mucous membranes are moist  Cardiovascular: Brisk capillary refill  Lungs: Breathing non-labored  Skin: no rashes appreciated  Neurologic: Sensation is grossly intact distally  ?  Hip exam:  Right?hip incision clean dry and intact. No erythema, drainage, or signs of infection  No swelling distally. No signs of DVT  No pain with logroll  Sensation intact distally to right foot  Positive FHL/EHL/gastrocsoleus/tib ant brisk capillary refill right foot  ?  Assessment/Plan  1.?Aftercare following joint replacement?Z47.1,?Aftercare following joint replacement?Z47.1  ?Overall the patient is doing fairly well. ?He mobility is improving quickly.? We will plan to allow him to begin putting more weight on this in the next 2 weeks. ?We will see him back in a month to recheck?his x-rays?as well as exam. ?Discontinue staples today.  Ordered:  acetaminophen-HYDROcodone, 1 tab(s), Oral, q4hr, X 7 day(s), # 42 tab(s), 0 Refill(s), Pharmacy: [id number], 184, cm, Height/Length Dosing, 06/02/20 12:05:00 CDT, 164.2, kg, Weight Dosing, 06/02/20 12:05:00 CDT  ?    Problem List/Past Medical History  Ongoing  Arthritis  Chronic a-fib  CPAP (continuous positive airway pressure) ventilation weaning protocol  Diabetes  Diabetes  Gout  HTN (hypertension)  Hyperlipidemia LDL goal <130  MRSA  Obesity  RLS (restless legs syndrome)  Sleep apnea  Historical  Acute kidney injury  Allergic rhinosinusitis  Ankle edema  Atrial fibrillation and flutter  Back pain  Bladder cancer  BPH - Benign prostatic hypertrophy  Chicken pox  Hypertension  Measles  EVA - Obstructive sleep apnea  PVD (peripheral vascular disease)  Procedure/Surgical History  Excision of Right Hip Joint, Open Approach (05/11/2020)  Removal of Synthetic Substitute from Right Hip Joint, Open Approach (05/11/2020)  Replacement of Right Hip Joint with Synthetic Substitute, Cemented, Open Approach (05/11/2020)  Total Hip Revision/Removal (Right) (05/11/2020)  Arthrocentesis, aspiration and/or injection, major joint or bursa (eg, shoulder, hip, knee, subacromial bursa); without ultrasound guidance (02/19/2020)  Drainage of Right Hip Joint, Percutaneous Approach (02/19/2020)  Fluoroscopy of Lower Extremity (02/19/2020)  Hip Aspiration (Right) (02/19/2020)  Catheter placement in coronary artery(s) for coronary angiography, including intraprocedural injection(s) for coronary angiography, imaging supervision and interpretation; with left heart catheterization including intraprocedural injection(s) for left arturo (09/15/2017)  Fluoroscopy of Left Heart using Low Osmolar Contrast (09/15/2017)  Fluoroscopy of Multiple Coronary Arteries using Low Osmolar Contrast (09/15/2017)  Measurement of Cardiac Sampling and Pressure, Left Heart, Percutaneous Approach (09/15/2017)  Cardioversion (.) (07/20/2017)  Cardioversion, elective, electrical conversion of arrhythmia; external (07/20/2017)  Restoration of Cardiac Rhythm, Single (07/20/2017)  Transesophageal Echo (for Surgery) (., None) (03/01/2017)  Removal of Synthetic Substitute from Right Hip  Joint, Open Approach (02/22/2017)  Replacement of Right Hip Joint with Metal on Polyethylene Synthetic Substitute, Open Approach (02/22/2017)  Total Hip Revision/Removal (Right) (02/22/2017)  DEMETRI SALAS Total Hip Arthroplasty (Right) (02/01/2017)  Replacement of Right Hip Joint with Synthetic Substitute, Open Approach (02/01/2017)  CLOSURE OF SKIN AND SUBCUTANEOUS TISSUE OTHER SITES (08/19/2014)  Simple repair of superficial wounds of scalp, neck, axillae, external genitalia, trunk and/or extremities (including hands and feet); 2.6 cm to 7.5 cm. (08/19/2014)  Debridement, subcutaneous tissue (includes epidermis and dermis, if performed); first 20 sq cm or less. (10/03/2012)  Excisional debridement of wound, infection, or burn (10/03/2012)  Debridement, subcutaneous tissue (includes epidermis and dermis, if performed); first 20 sq cm or less. (09/21/2012)  Excisional debridement of wound, infection, or burn (09/21/2012)  Angiogram  sx for bladder cancer  vein surgery   Medications  acetaminophen-hydrocodone 325 mg-5 mg oral tablet, 1 tab(s), Oral, q4hr  allopurinol 300 mg oral tablet, 300 mg= 1 tab(s), Oral, BID, 5 refills  apixaban 5 mg oral tablet, 5 mg= 1 tab(s), Oral, BID, 11 refills  ATORVASTATIN CALCIUM 10 MG TABS, 10 mg= 1 tab(s), Oral, Daily  colchicine 0.6 mg oral tablet, 0.6 mg= 1 tab(s), Oral, Daily, PRN  doxazosin 4 mg oral tablet, See Instructions, 3 refills  finasteride 5 mg oral tablet, See Instructions, 3 refills  glipiZIDE 2.5 mg oral tablet, extended release (XL tab), See Instructions, 2 refills  hydrochlorothiazide-triamterene 25 mg-37.5 mg oral capsule, See Instructions, 3 refills  metoprolol succinate 50 mg oral tablet extended release, 50 mg= 1 tab(s), Oral, BID, 3 refills  Mirapex 0.125 mg oral tablet, 0.125 mg= 1 tab(s), Oral, At Bedtime, 5 refills  nasal cpap mask, See Instructions  Xyzal 5 mg oral tablet, 5 mg= 1 tab(s), Oral, qPM, 5  refills  Allergies  clindamycin?(Rash)  Levaquin?(Hives)  Social History  Abuse/Neglect  No, 06/02/2020  No, 06/01/2020  No, 05/29/2020  No, 05/28/2020  Alcohol - Low Risk, 08/29/2014  Current, Beer, Daily, 09/15/2017  Employment/School  Retired, Previous employment/school: computer drafting, still playing in band., 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  Heart disease.: Father.  Primary malignant neoplasm of lung: Father.  Thyroid disease.: Sister.  Immunizations  Vaccine Date Status Comments   influenza virus vaccine, inactivated 10/09/2019 Given    pneumococcal 13-valent conjugate vaccine 01/24/2019 Given    influenza virus vaccine, inactivated 10/18/2018 Recorded    influenza virus vaccine, inactivated 09/21/2017 Recorded [facility]   influenza virus vaccine, inactivated 10/06/2016 Recorded    influenza virus vaccine, inactivated 11/03/2015 Recorded [facility]   pneumococcal 23-polyvalent vaccine 01/13/2015 Recorded    influenza virus vaccine, inactivated 09/25/2014 Recorded    tetanus/diphtheria/pertussis, acel(Tdap) 08/19/2014 Given    influenza virus vaccine, inactivated 10/05/2013 Recorded    influenza virus vaccine, inactivated 11/27/2012 Recorded    zoster vaccine live 11/27/2012 Recorded    Health Maintenance  Health Maintenance  ???Pending?(in the next year)  ??? ??OverDue  ??? ? ? ?Pneumococcal Vaccine due??and every?  ??? ??Due?  ??? ? ? ?Colorectal Screening ([facility]) due??06/02/20??and every?  ??? ? ? ?Diabetes Maintenance-Eye Exam due??06/02/20??and every?  ??? ? ? ?Medicare Annual Wellness Exam due??06/02/20??and every 1??year(s)  ??? ??Due In Future?  ??? ? ? ?ADL Screening not due until??10/03/20??and every 1??year(s)  ??? ? ? ?Advance Directive not due until??01/01/21??and every 1??year(s)  ??? ? ? ?Alcohol Misuse  Screening not due until??01/01/21??and every 1??year(s)  ??? ? ? ?Cognitive Screening not due until??01/01/21??and every 1??year(s)  ??? ? ? ?Fall Risk Assessment not due until??01/01/21??and every 1??year(s)  ??? ? ? ?Functional Assessment not due until??01/01/21??and every 1??year(s)  ??? ? ? ?Obesity Screening not due until??01/01/21??and every 1??year(s)  ??? ? ? ?Diabetes Maintenance-Fasting Lipid Profile not due until??01/28/21??and every 1??year(s)  ??? ? ? ?Diabetes Maintenance-Foot Exam not due until??01/28/21??and every 1??year(s)  ??? ? ? ?Aspirin Therapy for CVD Prevention not due until??01/29/21??and every 1??year(s)  ??? ? ? ?Diabetes Maintenance-HgbA1c not due until??05/05/21??and every 1??year(s)  ??? ? ? ?Hypertension Management-BMP not due until??05/28/21??and every 1??year(s)  ???Satisfied?(in the past 1 year)  ??? ??Satisfied?  ??? ? ? ?ADL Screening on??10/03/19.??Satisfied by Yamilka Lentz RN  ??? ? ? ?Advance Directive on??01/29/20.??Satisfied by Soni Negro LPN  ??? ? ? ?Alcohol Misuse Screening on??01/29/20.??Satisfied by Soni Negro LPN  ??? ? ? ?Aspirin Therapy for CVD Prevention on??01/29/20.??Satisfied by Shobha Chris  ??? ? ? ?Blood Pressure Screening on??06/02/20.??Satisfied by Adelaide Varner LPN  ??? ? ? ?Body Mass Index Check on??06/02/20.??Satisfied by Adelaide Varner LPN  ??? ? ? ?Cognitive Screening on??01/29/20.??Satisfied by Soni Negro LPN  ??? ? ? ?Coronary Artery Disease Maintenance-Lipid Lowering Therapy on??05/14/20.??Satisfied by Tamara Abdalla RN  ??? ? ? ?Depression Screening on??06/02/20.??Satisfied by Telly STAPLETON, Adelaide Lacy  ??? ? ? ?Diabetes Maintenance-HgbA1c on??05/05/20.??Satisfied by Krystin Contreras  ??? ? ? ?Diabetes Maintenance-Foot Exam on??01/29/20.??Satisfied by Shobha Chris  ??? ? ? ?Diabetes Maintenance-Fasting Lipid Profile on??01/29/20.??Satisfied by Contributor_system,  [facility]  ??? ? ? ?Diabetes Screening on??05/28/20.??Satisfied by Keiry Castellanos  ??? ? ? ?Fall Risk Assessment on??06/02/20.??Satisfied by Adelaide Varner LPN  ??? ? ? ?Functional Assessment on??06/02/20.??Satisfied by Adelaide Varner LPN  ??? ? ? ?Hypertension Management-Blood Pressure on??06/02/20.??Satisfied by Adelaide Varner LPN  ??? ? ? ?Hypertension Management-BMP on??05/28/20.??Satisfied by Keiry Castellanos  ??? ? ? ?Influenza Vaccine on??10/09/19.??Satisfied by Soni Negro LPN  ??? ? ? ?Lipid Screening on??01/29/20.??Satisfied by Contributor_system, [facility]  ??? ? ? ?Obesity Screening on??06/02/20.??Satisfied by Adelaide Varner LPN  ??? ? ? ?Smoking Cessation (Coronary Artery Disease) on??10/03/19.??Satisfied by Yamilka Lentz RN  ??? ? ? ?Smoking Cessation (Diabetes) on??10/03/19.??Satisfied by Yamilka Lentz RN  ??? ??Refused?  ??? ? ? ?Colorectal Screening ([facility]) on??01/29/20.??Recorded by Shobha Chris  ??? ? ? ?Diabetes Maintenance-Foot Exam on??07/24/19.??Recorded by Shobha Chris??Reason: Patient Refuses  ?  Diagnostic Results  AP lateral hip reviewed. ?Patients implants well fixed. ?No signs of loosening or subsidence noted.

## 2022-05-04 NOTE — HISTORICAL [facility] CERNER
This is a historical note converted from Lucio. Formatting and pictures may have been removed.  Please reference Lucio for original formatting and attached multimedia. Chief Complaint  RIGHT COLLIN ON 2/22/17 AND HAS A LARGE RED LUMP AT HIS INCISION SITE CAME OUT ABOTU 3-4 DAYS AGO. ?IT LOOKS LIKE A BOIL. ?HE CANT LIFT HIS LEG TO GET INTO THE CAR. ?ALOT OF SORENESS  History of Present Illness  74-year-old male presents today for follow-up total [facility] in the right side. ?Patient is 2 and half years out from his hip on the right side his postoperative course was complicated by irrigation debridement?with a staph infection.? He has been doing great for the last 2 and half years and has had no issues or complaints. ?Over the last 3 to 4 days he noted some swelling, worsening pain, redness to his midportion of his incision. ?He is here today for evaluation of that.  Review of Systems  Denies fevers, chills, chest pain, shortness of breath. Comprehensive review of systems performed and otherwise negative except as noted in HPI.  Physical Exam  Vitals & Measurements  T:?97.1? ?F (Oral)? BP:?142/77?  HT:?183?cm? WT:?157.85?kg? BMI:?47.13?  General: No acute distress, alert and oriented, healthy appearing?  HEENT: Head is atraumatic, mucous membranes are moist  Neck: Supples, no JVD  Cardiovascular: Palpable dorsalis pedis and posterior tibial pulses, regular rate and rhythm to those pulses  Lungs: Breathing non-labored  Skin: no rashes appreciated  Neurologic: Can flex and extend knees, ankles, and toes. Sensation is grossly intact  ?  Right hip:  Majority incision looks excellent. ?He does have an area of roughly 3 x 3 in the midportion that is red, raised, indurated.? Does have?mild pain with hip flexion with a positive Stinchfield. ?No pain with internal and external rotation.  Assessment/Plan  1.?Aftercare following joint replacement?Z47.1  ?Patient was doing?quite well after his hip arthroplasty for a number of years  however he had?signs of a superficial?versus deep infection today. ?We will start him on some anti-inflammatories. ?If this infection is superficial, this should improve. ?However if it is deep?coming from his hip joint, it appears that it may be an acute hematogenous infection and we could attempt to treat this with irrigation and debridement.? We will see him back in a week after course of antibiotics. ?If it is not significantly improved or gone, I would recommend irrigation debridement?with exploration to determine whether this is a superficial?versus deep?infection. ?We will send her for labs today as well.  Ordered:  C-Reactive Protein High Sensitivity, Now collect, 09/26/19 15:41:00 CDT, Blood, Order for future visit, Stop date 09/26/19 15:41:00 CDT, Lab Collect, Aftercare following joint replacement, 09/26/19 15:41:00 CDT  CBC w/ Auto Diff, Routine collect, 09/26/19 15:41:00 CDT, Blood, Order for future visit, Stop date 09/26/19 15:41:00 CDT, Lab Collect, Aftercare following joint replacement, 09/26/19 15:41:00 CDT  Clinic Follow up, *Est. 10/03/19 3:00:00 CDT, Order for future visit, [facility]  Office/Outpatient Visit Level 3 Established [id number] PC, Aftercare following joint replacement, [facility], 09/26/19 15:35:00 CDT  Sedimentation Rate, Routine collect, *Est. 09/26/19 3:00:00 CDT, Blood, Order for future visit, *Est. Stop date 09/26/19 3:00:00 CDT, Lab Collect, Aftercare following joint replacement, 09/26/19 15:41:00 CDT  ?  Orders:  clindamycin, 300 mg = 1 cap(s), Oral, q8hr, X 10 day(s), # 30 cap(s), 0 Refill(s), Pharmacy: [id number]  Referrals  Clinic Follow up, *Est. 10/03/19 3:00:00 CDT, Order for future visit, [facility]care following [facility]   Problem List/Past Medical History  Ongoing  AF (atrial fibrillation)  Arthritis  CPAP (continuous positive airway pressure) ventilation weaning  protocol  Diabetes  Diabetes  Gout  HTN (hypertension)  Hyperlipidemia LDL goal <130  MRSA  Obesity  PVD (peripheral vascular disease)  Sleep apnea  Historical  Allergic rhinosinusitis  Ankle edema  Atrial fibrillation and flutter  Back pain  Bladder cancer  BPH - Benign prostatic hypertrophy  Chicken pox  Hypertension  Measles  EVA - Obstructive sleep apnea  Procedure/Surgical History  Catheter placement in coronary artery(s) for coronary angiography, including intraprocedural injection(s) for coronary angiography, imaging supervision and interpretation; with left heart catheterization including intraprocedural injection(s) for left arturo (09/15/2017)  Fluoroscopy of Left Heart using Low Osmolar Contrast (09/15/2017)  Fluoroscopy of Multiple Coronary Arteries using Low Osmolar Contrast (09/15/2017)  Measurement of Cardiac Sampling and Pressure, Left Heart, Percutaneous Approach (09/15/2017)  Cardioversion (.) (07/20/2017)  Cardioversion, elective, electrical conversion of arrhythmia; external (07/20/2017)  Restoration of Cardiac Rhythm, Single (07/20/2017)  Transesophageal Echo (for Surgery) (., None) (03/01/2017)  Removal of Synthetic Substitute from Right Hip Joint, Open Approach (02/22/2017)  Replacement of Right Hip Joint with Metal on Polyethylene Synthetic Substitute, Open Approach (02/22/2017)  Total Hip Revision/Removal (Right) (02/22/2017)  [facility] Total Hip Arthroplasty (Right) (02/01/2017)  Replacement of Right Hip Joint with Synthetic Substitute, Open Approach (02/01/2017)  CLOSURE OF SKIN AND SUBCUTANEOUS TISSUE OTHER SITES (08/19/2014)  Simple repair of superficial wounds of scalp, neck, axillae, external genitalia, trunk and/or extremities (including hands and feet); 2.6 cm to 7.5 cm. (08/19/2014)  Debridement, subcutaneous tissue (includes epidermis and dermis, if performed); first 20 sq cm or less. (10/03/2012)  Excisional debridement of wound, infection, or burn (10/03/2012)  Debridement,  subcutaneous tissue (includes epidermis and dermis, if performed); first 20 sq cm or less. (09/21/2012)  Excisional debridement of wound, infection, or burn (09/21/2012)  Angiogram  sx for bladder cancer  vein surgery   Medications  allopurinol 300 mg oral tablet, 300 mg= 1 tab(s), Oral, BID  apixaban 5 mg oral tablet, 5 mg= 1 tab(s), Oral, BID, 11 refills  ATORVASTATIN CALCIUM 10 MG TABS, 10 mg= 1 tab(s), Oral, Daily  clindamycin 300 mg oral capsule, 300 mg= 1 cap(s), Oral, q8hr  doxazosin 4 mg oral tablet, See Instructions, 3 refills  finasteride 5 mg oral tablet, See Instructions, 3 refills  glipiZIDE 2.5 mg oral tablet, extended release (XL tab), See Instructions, 1 refills  hydrochlorothiazide-triamterene 25 mg-37.5 mg oral capsule, See Instructions, 3 refills  indomethacin 50 mg oral capsule, 50 mg= 1 cap(s), Oral, TID, 5 refills  metoprolol succinate 50 mg oral tablet extended release, 50 mg= 1 tab(s), Oral, BID, 3 refills  nasal cpap mask, See Instructions  Singulair 10 mg oral TABLET, 10 mg= 1 tab(s), Oral, qPM, 5 refills  Wellbutrin  mg/24 hours oral tablet, extended release, 150 mg= 1 tab(s), Oral, q24hr, 3 refills  Xyzal 5 mg oral tablet, 5 mg= 1 tab(s), Oral, qPM, 5 refills  Allergies  Levaquin?(Hives)  Social History  Abuse/Neglect  No, No, Yes, 09/26/2019  Alcohol - Low Risk, 08/29/2014  Current, Beer, Daily, 09/15/2017  Employment/School  Retired, Previous employment/school: computer drafting, still playing in band., 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  Heart disease.: Father.  Primary malignant neoplasm of lung: Father.  Thyroid disease.: Sister.  Immunizations  Vaccine Date Status Comments   pneumococcal 13-valent conjugate vaccine 01/24/2019 Given    influenza virus vaccine, inactivated 10/18/2018 Recorded    influenza  virus vaccine, inactivated 09/21/2017 Recorded [facility]   influenza virus vaccine, inactivated 10/06/2016 Recorded    influenza virus vaccine, inactivated 11/03/2015 Recorded [facility]   pneumococcal 23-polyvalent vaccine 01/13/2015 Recorded    influenza virus vaccine, inactivated 09/25/2014 Recorded    tetanus/diphtheria/pertussis, acel(Tdap) 08/19/2014 Given    influenza virus vaccine, inactivated 10/05/2013 Recorded    influenza virus vaccine, inactivated 11/27/2012 Recorded    zoster vaccine live 11/27/2012 Recorded    Health Maintenance  Health Maintenance  ???Pending?(in the next year)  ??? ??OverDue  ??? ? ? ?Pneumococcal Vaccine due??and every?  ??? ? ? ?Diabetes Maintenance-Foot Exam due??06/13/17??and every 1??year(s)  ??? ??Due?  ??? ? ? ?Colorectal Screening ([facility]) due??09/26/19??and every?  ??? ? ? ?Diabetes Maintenance-Eye Exam due??09/26/19??and every?  ??? ??Due In Future?  ??? ? ? ?Advance Directive not due until??01/01/20??and every 1??year(s)  ??? ? ? ?Alcohol Misuse Screening not due until??01/01/20??and every 1??year(s)  ??? ? ? ?Cognitive Screening not due until??01/01/20??and every 1??year(s)  ??? ? ? ?Fall Risk Assessment not due until??01/01/20??and every 1??year(s)  ??? ? ? ?Functional Assessment not due until??01/01/20??and every 1??year(s)  ??? ? ? ?Geriatric Depression Screening not due until??01/01/20??and every 1??year(s)  ??? ? ? ?Obesity Screening not due until??01/01/20??and every 1??year(s)  ??? ? ? ?Aspirin Therapy for CVD Prevention not due until??01/23/20??and every 1??year(s)  ??? ? ? ?ADL Screening not due until??01/24/20??and every 1??year(s)  ??? ? ? ?Diabetes Maintenance-Fasting Lipid Profile not due until??07/23/20??and every 1??year(s)  ??? ? ? ?Diabetes Maintenance-HgbA1c not due until??07/23/20??and every 1??year(s)  ??? ? ? ?Hypertension Management-BMP not due until??07/23/20??and every 1??year(s)  ??? ? ? ?Hypertension Management-Blood Pressure not due  until??07/23/20??and every 1??year(s)  ???Satisfied?(in the past 1 year)  ??? ??Satisfied?  ??? ? ? ?ADL Screening on??01/24/19.??Satisfied by Shobha Chris  ??? ? ? ?Advance Directive on??07/24/19.??Satisfied by Soni Negro LPN  ??? ? ? ?Alcohol Misuse Screening on??01/24/19.??Satisfied by Shobha Chris  ??? ? ? ?Aspirin Therapy for CVD Prevention on??01/23/19.??Satisfied by Shobha Chris  ??? ? ? ?Blood Pressure Screening on??09/26/19.??Satisfied by María Elena Flor  ??? ? ? ?Body Mass Index Check on??09/26/19.??Satisfied by María Elena Flor  ??? ? ? ?Cognitive Screening on??07/24/19.??Satisfied by Ronni Negro LPNle  ??? ? ? ?Coronary Artery Disease Maintenance-Lipid Lowering Therapy on??10/23/18.  ??? ? ? ?Depression Screening on??01/24/19.??Satisfied by Shobha Chris  ??? ? ? ?Diabetes Maintenance-Fasting Lipid Profile on??07/24/19.??Satisfied by Cont[facility]system, [facility]  ??? ? ? ?Diabetes Maintenance-HgbA1c on??07/24/19.??Satisfied by Ronni Negro LPNle  ??? ? ? ?Fall Risk Assessment on??01/24/19.??Satisfied by Sruthi STAPLETON Soni  ??? ? ? ?Functional Assessment on??01/24/19.??Satisfied by Shobha Chris  ??? ? ? ?Geriatric Depression Screening on??01/24/19.??Satisfied by Shobha Chris  ??? ? ? ?Hypertension Management-Blood Pressure on??09/26/19.??Satisfied by María Elena Flor  ??? ? ? ?Hypertension Management-BMP on??08/20/19.??Satisfied by Mp Mcwilliams  ??? ? ? ?Influenza Vaccine on??12/03/18.??Satisfied by Soni Negro LPN  ??? ? ? ?Lipid Screening on??07/24/19.??Satisfied by [facility]ributor[facility]system, [facility]  ??? ? ? ?Obesity Screening on??09/26/19.??Satisfied by María Elena Flor  ??? ? ? ?Pneumococcal Vaccine on??01/24/19.??Satisfied by Soin Negro LPN  ??? ??Refused?  ??? ? ? ?Colorectal Screening ([facility]) on??07/24/19.??Recorded by Shobha Chris??Reason: Patient Refuses  ???  ? ? ?Diabetes Maintenance-Foot Exam on??07/24/19.??Recorded by Shobha Chris??Reason: Patient Refuses  ?  Diagnostic Results  AP lateral right hip reviewed. ?Patients implants have not significantly shifted removed. ?Does have a slight radiolucent line around his lateral acetabulum.      Patient with painful total hip arthroplasty on the right side.? He is being treated for this?on this date.

## 2022-05-04 NOTE — HISTORICAL [facility] CERNER
This is a historical note converted from Lucio. Formatting and pictures may have been removed.  Please reference Lucio for original formatting and attached multimedia. Chief Complaint  6 week fu for rt hip sx 02/02/17 global 05/23/17 [facility]  History of Present Illness  71-year-old male returns today for follow-up after?right total hip arthroplasty.? Postoperative course complicated by?infection. ?He has completed his course of?IV and oral antibiotic treatment. ?Patient continues to be on?oral Bactrim.? He has been cleared to stop his antibiotics per?infectious disease.? He is doing very well with regards to his right hip.? Denies any significant groin pain?or other issues currently.? He does complain of some ongoing knee pain.? He?has a history of a?knee arthritis?the left side that was treated with injections about 6 months ago.? He is requesting repeat injection today.  Review of Systems  Denies fevers, chills, chest pain, shortness of breath. Review of systems otherwise negative except as in HPI.  Physical Exam  Vitals & Measurements  BP:?101/56? HT:?183?cm? HT:?183?cm? WT:?142.9?kg? WT:?142.9?kg? BMI:?42.67?  General: No acute distress, alert and oriented, healthy appearing?  HEENT: Head is atraumatic, mucous membranes are moist  Neck: Supples, no JVD  Cardiovascular: Palpable dorsalis pedis and posterior tibial pulses, regular rate and rhythm to those pulses  Lungs: Breathing non-labored  Skin: no rashes appreciated  Neurologic: Can flex and extend knees, ankles, and toes. Sensation is grossly intact?Hip exam:  Right?hip incision clean dry and intact. No erythema, drainage, or signs of infection  No swelling distally. No signs of DVT  No pain with logroll  Sensation intact distally to right foot  Positive FHL/EHL/gastrocsoleus/tib ant brisk capillary refill right foot  ?  ?  Focused Orthopedic Exam:?  Left knee without wound or skin breakdown.  Mild 2+ joint effusion  tenderness to palpation about the medial  joint line  ROM from 5 extension to 95 flexion  stable to varus/valgus. stable to anterior/posterior drawer  Negative McMurrays  Moderate patellofemoral crepitus to ROM  ?  Assessment/Plan  1.?Infection of right prosthetic hip joint  Patient is 3 months out from?irrigation debridement of?acute postop right total hip infection.? Is no signs of recurrent infection today?and his labs are returned to normal.? I discussed with the patient?cessation of antibiotics. ?He like to continue them for another few months. ?I will switch him?to doxycycline?as he does have a history of some renal dysfunction?previously.? Otherwise we will see him back in a couple months?to see how is doing with regards to his right hip.  Ordered:  Clinic Follow up, *Est. 08/01/17 9:00:00 CDT, Order for future visit, Primary osteoarthritis of left knee, [facility]  Office/Outpatient Visit Level 3 Established [id number] PC, Infection of right prosthetic hip joint  Primary osteoarthritis of left knee  Primary osteoarthritis of right knee, [facility], 05/30/17 11:05:00 CDT  ?  2.?Primary osteoarthritis of left knee  X-rays were reviewed and patient does have end-stage arthritis of his left knee.? He has improved with an injection in the past like a repeat injection today.? we will provide him with 1 of these.??  ?  After verbal consent was obtained the patients left knee was prepped with Betadine and anesthetized with ethyl chloride. The left knee joint was then injected under sterile technique with 2 mL of 2% lidocaine and 12 mg betamethasone. It was dressed with a Band-Aid. The patient received good relief from the injection and was able to ambulate normally from clinic.  Ordered:  betamethasone, 12 mg, Intra-Articular, Once, first dose 05/30/17 12:00:00 CDT, stop date 05/30/17 12:00:00 CDT, 24  Lidocaine inj., 2 mL, Intra-Articular, Once, first dose 05/30/17 11:27:00 CDT, stop date 05/30/17 11:27:00 CDT  asp/inj jnt/bursa, major  [id number] PC, 05/30/17 11:27:00 CDT, [facility], Routine, 05/30/17 11:27:00 CDT  Clinic Follow up, *Est. 08/01/17 9:00:00 CDT, Order for future visit, Primary osteoarthritis of left knee, [facility]  Office/Outpatient Visit Level 3 Established [id number] PC, Infection of right prosthetic hip joint  Primary osteoarthritis of left knee  Primary osteoarthritis of right knee, [facility], 05/30/17 11:05:00 CDT  ?  Orders:  doxycycline, 100 mg = 1 cap(s), Oral, BID, X 30 day(s), # 60 cap(s), 2 Refill(s), Pharmacy: [id number]   Problem List/Past Medical History  CPAP (continuous positive airway pressure) ventilation weaning protocol  Diabetes  HTN (hypertension)  Infection of right prosthetic hip joint  Malnutrition  MRSA  Sleep apnea  Historical  Allergic rhinosinusitis  Ankle edema  Arthritis  Back pain  Bladder cancer  Chicken pox  Diabetes  Hypertension  Measles  Procedure/Surgical History  Transesophageal Echo (for Surgery) (., None) (03/01/2017), Removal of Synthetic Substitute from Right Hip Joint, Open Approach (02/22/2017), Replacement of Right Hip Joint with Metal on Polyethylene Synthetic Substitute, Open Approach (02/22/2017), Total Hip Revision/Removal (Right) (02/22/2017), [facility] Total Hip Arthroplasty (Right) (02/01/2017), Replacement of Right Hip Joint with Synthetic Substitute, Open Approach (02/01/2017), CLOSURE OF SKIN AND SUBCUTANEOUS TISSUE OTHER SITES (08/19/2014), Simple repair of superficial wounds of scalp, neck, axillae, external genitalia, trunk and/or extremities (including hands and feet); 2.6 cm to 7.5 cm. (08/19/2014), Debridement, subcutaneous tissue (includes epidermis and dermis, if performed); first 20 sq cm or less. (10/03/2012), Excisional debridement of wound, infection, or burn (10/03/2012), Debridement, subcutaneous tissue (includes epidermis and dermis, if performed); first 20 sq cm or less. (09/21/2012), Excisional debridement of wound,  infection, or burn (09/21/2012), sx for bladder cancer, vein surgery.  Medications  CEFDINIR 300 MG CAPS,? ?Not Taking, Completed Rx  Celestone, 12 mg, Intra-Articular, Once  dabigatran 150 mg oral capsule, 150 mg, 1 cap(s), Oral, BID  doxazosin 4 mg oral tablet, 1 tab(s), Oral, Daily  DOXYCYCLINE HYCLATE 100 MG CAPS,? ?Not Taking, Completed Rx  doxycycline hyclate 100 mg oral capsule, 100 mg, 1 cap(s), Oral, BID, 2 refills  finasteride 5 mg oral tablet, 5 mg, 1 tab(s), Oral, Daily  glipiZIDE 2.5 mg oral tablet, extended release (XL tab), 2.5 mg, 1 tab(s), Oral, Daily  lidocaine 2% injectable solution, 2 mL, Intra-Articular, Once  METOPROLOL SUCCINATE ER 25 MG TB24, 50 mg, Oral, BID  METOPROLOL SUCCINATE ER 50 MG TB24,? ?Not taking  TRIAMT/HCTZ CAP 37.5-25  Allergies  Levaquin  Social History  Alcohol - Low Risk  Current, Beer, Daily  Employment/School  Retired, Previous employment/school: computer drafting, still playing in band.  Home/Environment  Lives with Spouse.  Tobacco - Denies Tobacco Use  Never smoker  Family History  CAD (coronary artery disease): Mother.  Heart disease.: Father.  Primary malignant neoplasm of lung: Father.  Thyroid disease.: Sister.  Diagnostic Results  AP lateral right hip taken and reviewed. ?Patients components appear to be well fixed. ?No signs of loosening or subsidence.

## 2022-05-09 ENCOUNTER — TELEPHONE (OUTPATIENT)
Dept: FAMILY MEDICINE | Facility: CLINIC | Age: 77
End: 2022-05-09
Payer: MEDICARE

## 2022-05-09 ENCOUNTER — OFFICE VISIT (OUTPATIENT)
Dept: FAMILY MEDICINE | Facility: CLINIC | Age: 77
End: 2022-05-09
Payer: MEDICARE

## 2022-05-09 VITALS
HEIGHT: 72 IN | SYSTOLIC BLOOD PRESSURE: 126 MMHG | RESPIRATION RATE: 18 BRPM | BODY MASS INDEX: 42.66 KG/M2 | TEMPERATURE: 98 F | WEIGHT: 315 LBS | DIASTOLIC BLOOD PRESSURE: 84 MMHG | HEART RATE: 78 BPM | OXYGEN SATURATION: 98 %

## 2022-05-09 DIAGNOSIS — E78.5 HYPERLIPIDEMIA, UNSPECIFIED HYPERLIPIDEMIA TYPE: ICD-10-CM

## 2022-05-09 DIAGNOSIS — Z12.5 PROSTATE CANCER SCREENING: ICD-10-CM

## 2022-05-09 DIAGNOSIS — I10 HYPERTENSION, UNSPECIFIED TYPE: ICD-10-CM

## 2022-05-09 DIAGNOSIS — L03.116 CELLULITIS OF FOOT, LEFT: Primary | ICD-10-CM

## 2022-05-09 DIAGNOSIS — E11.9 TYPE 2 DIABETES MELLITUS WITHOUT COMPLICATION, WITHOUT LONG-TERM CURRENT USE OF INSULIN: ICD-10-CM

## 2022-05-09 PROBLEM — E66.9 OBESITY: Status: ACTIVE | Noted: 2022-05-09

## 2022-05-09 PROBLEM — G25.81 RESTLESS LEGS SYNDROME: Status: ACTIVE | Noted: 2022-05-09

## 2022-05-09 PROBLEM — M19.90 ARTHRITIS: Status: ACTIVE | Noted: 2022-05-09

## 2022-05-09 PROBLEM — M10.9 GOUT: Status: ACTIVE | Noted: 2022-05-09

## 2022-05-09 PROBLEM — I48.20 CHRONIC ATRIAL FIBRILLATION: Status: ACTIVE | Noted: 2022-05-09

## 2022-05-09 PROBLEM — B95.62 CELLULITIS OF RIGHT FOOT DUE TO METHICILLIN-RESISTANT STAPHYLOCOCCUS AUREUS: Status: ACTIVE | Noted: 2022-05-09

## 2022-05-09 PROBLEM — L03.115 CELLULITIS OF RIGHT FOOT DUE TO METHICILLIN-RESISTANT STAPHYLOCOCCUS AUREUS: Status: ACTIVE | Noted: 2022-05-09

## 2022-05-09 PROBLEM — G47.30 SLEEP APNEA: Status: ACTIVE | Noted: 2022-05-09

## 2022-05-09 PROCEDURE — 99214 OFFICE O/P EST MOD 30 MIN: CPT | Mod: ,,, | Performed by: NURSE PRACTITIONER

## 2022-05-09 PROCEDURE — 99214 PR OFFICE/OUTPT VISIT, EST, LEVL IV, 30-39 MIN: ICD-10-PCS | Mod: ,,, | Performed by: NURSE PRACTITIONER

## 2022-05-09 RX ORDER — LORATADINE 10 MG/1
10 TABLET ORAL DAILY PRN
COMMUNITY

## 2022-05-09 RX ORDER — GLIPIZIDE 5 MG/1
5 TABLET, FILM COATED, EXTENDED RELEASE ORAL DAILY
COMMUNITY
Start: 2022-03-08 | End: 2023-03-16

## 2022-05-09 RX ORDER — APIXABAN 5 MG/1
5 TABLET, FILM COATED ORAL 2 TIMES DAILY
COMMUNITY
Start: 2022-03-07

## 2022-05-09 RX ORDER — DOXAZOSIN 4 MG/1
4 TABLET ORAL DAILY
COMMUNITY
Start: 2022-03-22 | End: 2022-07-21

## 2022-05-09 RX ORDER — MONTELUKAST SODIUM 10 MG/1
10 TABLET ORAL NIGHTLY
COMMUNITY
End: 2022-09-14

## 2022-05-09 RX ORDER — ROSUVASTATIN CALCIUM 20 MG/1
20 TABLET, COATED ORAL NIGHTLY
COMMUNITY
Start: 2022-03-07

## 2022-05-09 RX ORDER — SULFAMETHOXAZOLE AND TRIMETHOPRIM 800; 160 MG/1; MG/1
1 TABLET ORAL 2 TIMES DAILY
Qty: 20 TABLET | Refills: 0 | Status: SHIPPED | OUTPATIENT
Start: 2022-05-09 | End: 2022-05-19

## 2022-05-09 RX ORDER — ALLOPURINOL 300 MG/1
300 TABLET ORAL DAILY
COMMUNITY
Start: 2021-07-23 | End: 2022-08-29

## 2022-05-09 RX ORDER — FINASTERIDE 5 MG/1
5 TABLET, FILM COATED ORAL DAILY
COMMUNITY
Start: 2022-05-02 | End: 2023-04-11

## 2022-05-09 RX ORDER — TRAMADOL HYDROCHLORIDE 50 MG/1
TABLET ORAL 2 TIMES DAILY PRN
COMMUNITY
Start: 2022-03-08 | End: 2022-12-13

## 2022-05-09 RX ORDER — DOXYCYCLINE 100 MG/1
100 CAPSULE ORAL 2 TIMES DAILY
Qty: 20 CAPSULE | Refills: 0 | Status: SHIPPED | OUTPATIENT
Start: 2022-05-09 | End: 2022-05-19

## 2022-05-09 RX ORDER — TRIAMTERENE AND HYDROCHLOROTHIAZIDE 37.5; 25 MG/1; MG/1
2 CAPSULE ORAL DAILY
COMMUNITY
Start: 2022-03-22 | End: 2022-10-19

## 2022-05-09 NOTE — PATIENT INSTRUCTIONS
Continue Epsom salt soaks, elevate when at rest.  Take Bactrim and doxycycline as directed.  Go to ER for any worsening.  Send message to clinic if any issues.

## 2022-05-09 NOTE — TELEPHONE ENCOUNTER
----- Message from Jean Rios sent at 5/9/2022  8:02 AM CDT -----  Contact: patient  This pt called in regards to having a toenail that is red and is turning darker I advised the pt to go to urgent care he states every time he goes to urgent care they advise that he go to his PCP which is  pt states his toe been like this since Friday. A good call back number would be [phone]

## 2022-05-09 NOTE — TELEPHONE ENCOUNTER
After discussing with Beulah, pt instructed to come in for same day appt this morning. Pt states he will be here in 20 minutes.

## 2022-05-09 NOTE — PROGRESS NOTES
"Subjective:       Patient ID: Chris Mendoza II is a 76 y.o. male.    Chief Complaint: Redness to L great toe and second digit (Since Friday)      HPI   This is a 76-year-old white male who presents to the clinic today with complaint of redness to his left 1st and 2nd toes that started Thursday night but significantly worsened Friday.  States he had some Keflex at the house and took that over the weekend but it does not seem to be helping.  Does itch and hurt.  Other past medical history includes history of significant cellulitis/MRSA, hyperlipidemia, hypertension, type 2 diabetes, sleep apnea, arthritis, gout, AFib, ROS.        Review of Systems   Constitutional: Negative.  Negative for chills and fever.   HENT: Negative.    Eyes: Negative.    Respiratory: Negative.    Cardiovascular: Negative.    Gastrointestinal: Negative.    Musculoskeletal: Negative.    Integumentary:  Positive for color change.   Allergic/Immunologic: Negative.    Neurological: Negative.    Hematological: Negative.    Psychiatric/Behavioral: Negative.    All other systems reviewed and are negative.        Objective:       /84 (BP Location: Left arm)   Pulse 78   Temp 98 °F (36.7 °C) (Oral)   Resp 18   Ht 5' 11.65" (1.82 m)   Wt (!) 166.8 kg (367 lb 12.8 oz)   SpO2 98%   BMI 50.37 kg/m²      Physical Exam  Vitals and nursing note reviewed.   Constitutional:       Appearance: Normal appearance. He is obese.   HENT:      Head: Normocephalic and atraumatic.      Right Ear: Tympanic membrane, ear canal and external ear normal.      Left Ear: Tympanic membrane, ear canal and external ear normal.      Nose: Nose normal.      Mouth/Throat:      Mouth: Mucous membranes are moist.      Pharynx: Oropharynx is clear.   Eyes:      Extraocular Movements: Extraocular movements intact.      Conjunctiva/sclera: Conjunctivae normal.      Pupils: Pupils are equal, round, and reactive to light.   Cardiovascular:      Rate and Rhythm: Normal rate and " regular rhythm.      Pulses: Normal pulses.      Heart sounds: Normal heart sounds.   Pulmonary:      Effort: Pulmonary effort is normal.      Breath sounds: Normal breath sounds.   Abdominal:      General: Abdomen is flat. Bowel sounds are normal.      Palpations: Abdomen is soft.   Musculoskeletal:         General: Normal range of motion.      Cervical back: Normal range of motion and neck supple.        Feet:    Feet:      Left foot:      Skin integrity: Erythema and warmth present.   Skin:     General: Skin is warm and dry.      Findings: Erythema present.   Neurological:      General: No focal deficit present.      Mental Status: He is alert and oriented to person, place, and time.   Psychiatric:         Mood and Affect: Mood normal.         Behavior: Behavior normal.         Thought Content: Thought content normal.         Judgment: Judgment normal.         Labs  BMP  Lab Results   Component Value Date    CO2 22 (L) 08/31/2021    BUN 17.5 08/31/2021    CREATININE 1.48 (H) 08/31/2021    CALCIUM 9.2 08/31/2021    EGFRNONAA 49 08/31/2021     Lab Results   Component Value Date    ALT 15 08/31/2021    AST 17 08/31/2021    ALKPHOS 68 08/31/2021     Lab Results   Component Value Date    HGBA1C 7.4 (H) 08/31/2021     Lab Results   Component Value Date    CHOL 99 08/31/2021    CHOL 158 03/01/2021    CHOL 132 09/08/2017     Lab Results   Component Value Date    HDL 33 (L) 08/31/2021    HDL 29 (L) 03/01/2021    HDL 44 09/08/2017     No results found for: LDLCALC  Lab Results   Component Value Date    TRIG 87 08/31/2021    TRIG 111 03/01/2021    TRIG 46 09/08/2017     No results found for: CHOLHDL        Assessment:       Problem List Items Addressed This Visit        Cardiac/Vascular    Hypertension    Relevant Orders    CBC Auto Differential    Comprehensive Metabolic Panel    Hyperlipidemia    Relevant Orders    CBC Auto Differential    Comprehensive Metabolic Panel    Lipid Panel       Endocrine    Type 2 diabetes  mellitus without complication, without long-term current use of insulin    Relevant Medications    glipiZIDE (GLUCOTROL) 5 MG TR24    Other Relevant Orders    CBC Auto Differential    Comprehensive Metabolic Panel    Hemoglobin A1C    Microalbumin/Creatinine Ratio, Urine      Other Visit Diagnoses     Cellulitis of foot, left    -  Primary    Relevant Medications    doxycycline (MONODOX) 100 MG capsule    sulfamethoxazole-trimethoprim 800-160mg (BACTRIM DS) 800-160 mg Tab    Prostate cancer screening        Relevant Orders    PSA, Screening          Plan:         1. Cellulitis of foot, left  - doxycycline (MONODOX) 100 MG capsule; Take 1 capsule (100 mg total) by mouth 2 (two) times daily. for 10 days  Dispense: 20 capsule; Refill: 0  - sulfamethoxazole-trimethoprim 800-160mg (BACTRIM DS) 800-160 mg Tab; Take 1 tablet by mouth 2 (two) times daily. for 10 days  Dispense: 20 tablet; Refill: 0    2. Hypertension, unspecified type  - CBC Auto Differential; Future  - Comprehensive Metabolic Panel; Future    3. Hyperlipidemia, unspecified hyperlipidemia type  - CBC Auto Differential; Future  - Comprehensive Metabolic Panel; Future  - Lipid Panel; Future    4. Type 2 diabetes mellitus without complication, without long-term current use of insulin  - CBC Auto Differential; Future  - Comprehensive Metabolic Panel; Future  - Hemoglobin A1C; Future  - Microalbumin/Creatinine Ratio, Urine; Future    5. Prostate cancer screening  - PSA, Screening; Future        Patient instructed to contact clinic if not improving in 36 hours.  Instructed to go to the ED for any worsening due to history of severe cellulitis/MRSA.  Will return to clinic for scheduled follow-up in September for chronic disease management.

## 2022-09-07 ENCOUNTER — TELEPHONE (OUTPATIENT)
Dept: FAMILY MEDICINE | Facility: CLINIC | Age: 77
End: 2022-09-07
Payer: MEDICARE

## 2022-09-07 NOTE — TELEPHONE ENCOUNTER
Are there any outstanding tasks in patient's chart?  labs    2. Do we have outstanding/pending referrals?    n  3. Has the patient been seen in an ER, Urgent Care, or admitted since last visit?    n  4. Has patient seen any other health care providers since last visit?  n    5.  Has patient had any blood work or x-rays done since last visit?   Will complete labs at [facility] on 9/12/22- orders in system

## 2022-09-12 ENCOUNTER — LAB VISIT (OUTPATIENT)
Dept: LAB | Facility: HOSPITAL | Age: 77
End: 2022-09-12
Attending: NURSE PRACTITIONER
Payer: MEDICARE

## 2022-09-12 DIAGNOSIS — E11.9 TYPE 2 DIABETES MELLITUS WITHOUT COMPLICATION, WITHOUT LONG-TERM CURRENT USE OF INSULIN: ICD-10-CM

## 2022-09-12 LAB
CREAT UR-MCNC: 117.9 MG/DL (ref 63–166)
MICROALBUMIN UR-MCNC: 6.6 UG/ML
MICROALBUMIN/CREAT RATIO PNL UR: 5.6 MG/GM CR (ref 0–30)

## 2022-09-12 PROCEDURE — 82043 UR ALBUMIN QUANTITATIVE: CPT

## 2022-09-14 ENCOUNTER — TELEPHONE (OUTPATIENT)
Dept: FAMILY MEDICINE | Facility: CLINIC | Age: 77
End: 2022-09-14

## 2022-09-14 ENCOUNTER — OFFICE VISIT (OUTPATIENT)
Dept: FAMILY MEDICINE | Facility: CLINIC | Age: 77
End: 2022-09-14
Payer: MEDICARE

## 2022-09-14 VITALS
HEIGHT: 72 IN | OXYGEN SATURATION: 94 % | SYSTOLIC BLOOD PRESSURE: 110 MMHG | RESPIRATION RATE: 16 BRPM | WEIGHT: 315 LBS | TEMPERATURE: 99 F | HEART RATE: 84 BPM | DIASTOLIC BLOOD PRESSURE: 78 MMHG | BODY MASS INDEX: 42.66 KG/M2

## 2022-09-14 DIAGNOSIS — E11.22 TYPE 2 DIABETES MELLITUS WITH STAGE 3A CHRONIC KIDNEY DISEASE, WITHOUT LONG-TERM CURRENT USE OF INSULIN: Primary | ICD-10-CM

## 2022-09-14 DIAGNOSIS — G25.81 RESTLESS LEGS SYNDROME: ICD-10-CM

## 2022-09-14 DIAGNOSIS — N18.31 TYPE 2 DIABETES MELLITUS WITH STAGE 3A CHRONIC KIDNEY DISEASE, WITHOUT LONG-TERM CURRENT USE OF INSULIN: Primary | ICD-10-CM

## 2022-09-14 DIAGNOSIS — L98.9 SKIN LESION OF LEFT ARM: ICD-10-CM

## 2022-09-14 DIAGNOSIS — E78.5 HYPERLIPIDEMIA, UNSPECIFIED HYPERLIPIDEMIA TYPE: ICD-10-CM

## 2022-09-14 DIAGNOSIS — Z11.59 NEED FOR HEPATITIS C SCREENING TEST: ICD-10-CM

## 2022-09-14 DIAGNOSIS — I10 HYPERTENSION, UNSPECIFIED TYPE: ICD-10-CM

## 2022-09-14 PROCEDURE — 99214 OFFICE O/P EST MOD 30 MIN: CPT | Mod: ,,, | Performed by: NURSE PRACTITIONER

## 2022-09-14 PROCEDURE — 99214 PR OFFICE/OUTPT VISIT, EST, LEVL IV, 30-39 MIN: ICD-10-PCS | Mod: ,,, | Performed by: NURSE PRACTITIONER

## 2022-09-14 RX ORDER — LINAGLIPTIN 5 MG/1
5 TABLET, FILM COATED ORAL DAILY
Qty: 90 TABLET | Refills: 3 | Status: SHIPPED | OUTPATIENT
Start: 2022-09-14 | End: 2022-12-13

## 2022-09-14 RX ORDER — PRAMIPEXOLE DIHYDROCHLORIDE 0.12 MG/1
0.12 TABLET ORAL NIGHTLY
Qty: 90 TABLET | Refills: 3 | Status: SHIPPED | OUTPATIENT
Start: 2022-09-14 | End: 2023-03-16

## 2022-09-14 RX ORDER — HYDROCODONE BITARTRATE AND ACETAMINOPHEN 10; 325 MG/1; MG/1
.5-1 TABLET ORAL EVERY 6 HOURS PRN
COMMUNITY
Start: 2022-08-25 | End: 2022-12-13

## 2022-09-14 NOTE — ASSESSMENT & PLAN NOTE
Hemoglobin A1c 7.9, add Tradjenta 5 mg daily, recheck 3 months with A1c in office.  Refuses foot exam.  Eye exam up-to-date.

## 2022-09-14 NOTE — PROGRESS NOTES
Subjective:       Patient ID: Chris Mendoza II is a 77 y.o. male.    Chief Complaint: Diabetes (6 month f/u), Hyperlipidemia (6 month f/u), Hypertension (6 month f/u), and Chronic Kidney Disease (6 month f/u)      HPI   This is a 77-year-old white male who presents to clinic today for a six-month follow-up.  Patient has a history of type 2 diabetes, chronic AFib, hyperlipidemia, hypertension, cellulitis, obesity, arthritis, RLS, gout, sleep apnea.  States overall he is doing well.  Does have a complaint today of trouble sleeping and restless legs.  States that he was on medication in the past that helped this but he stopped it and symptoms did not return until recently.    Review of Systems  Comprehensive review of systems negative except as stated in HPI    The patient's Health Maintenance was reviewed and the following appears to be due:   Health Maintenance Due   Topic Date Due    Hepatitis C Screening  Never done    Foot Exam  Never done    Shingles Vaccine (2 of 3) 01/22/2013    Influenza Vaccine (1) 09/01/2022    Eye Exam  10/13/2022       Past Medical History:  Past Medical History:   Diagnosis Date    Arthritis     Atrial fibrillation     Bladder cancer     CKD (chronic kidney disease)     Diabetes mellitus, type 2     Gout, unspecified     Hyperlipidemia     Hypertension     Restless leg syndrome     Sleep apnea, unspecified      Past Surgical History:   Procedure Laterality Date    BLADDER SURGERY      CARDIAC CATHETERIZATION      CARDIOVERSION      CATARACT EXTRACTION BILATERAL W/ ANTERIOR VITRECTOMY      JOINT REPLACEMENT Right     Total Hip    MULTIPLE TOOTH EXTRACTIONS      VEIN SURGERY       Review of patient's allergies indicates:   Allergen Reactions    Clindamycin Rash    Levofloxacin Hives     Current Outpatient Medications on File Prior to Visit   Medication Sig Dispense Refill    allopurinoL (ZYLOPRIM) 300 MG tablet Take 1 tablet by mouth twice daily (Patient taking differently: Take 300 mg by  mouth once daily at 6am.) 60 tablet 5    doxazosin (CARDURA) 4 MG tablet Take 1 tablet by mouth once daily 90 tablet 0    ELIQUIS 5 mg Tab Take 5 mg by mouth 2 (two) times daily.      finasteride (PROSCAR) 5 mg tablet Take 5 mg by mouth once daily.      glipiZIDE (GLUCOTROL) 5 MG TR24 Take 5 mg by mouth once daily.      HYDROcodone-acetaminophen (NORCO)  mg per tablet Take 0.5-1 tablets by mouth every 6 (six) hours as needed.      rosuvastatin (CRESTOR) 20 MG tablet Take 20 mg by mouth every evening.      traMADoL (ULTRAM) 50 mg tablet 2 (two) times daily as needed.      triamterene-hydrochlorothiazide 37.5-25 mg (DYAZIDE) 37.5-25 mg per capsule Take 2 capsules by mouth once daily.      loratadine (CLARITIN) 10 mg tablet Take 10 mg by mouth.      [DISCONTINUED] montelukast (SINGULAIR) 10 mg tablet Take 10 mg by mouth every evening.       No current facility-administered medications on file prior to visit.     Social History     Socioeconomic History    Marital status:    Tobacco Use    Smoking status: Never    Smokeless tobacco: Never   Substance and Sexual Activity    Alcohol use: Yes     Alcohol/week: 3.0 standard drinks     Types: 3 Shots of liquor per week     Comment: seldom    Drug use: Not Currently     Types: Marijuana    Sexual activity: Yes     Family History   Problem Relation Age of Onset    Coronary artery disease Mother     Cancer Father     Heart disease Father     Thyroid disease Sister        Objective:       /78 (BP Location: Right arm)   Pulse 84   Temp 98.6 °F (37 °C) (Oral)   Resp 16   Ht 6' (1.829 m)   Wt (!) 158.8 kg (350 lb)   SpO2 (!) 94%   BMI 47.47 kg/m²      Physical Exam  Vitals and nursing note reviewed.   Constitutional:       Appearance: Normal appearance. He is obese.   HENT:      Head: Normocephalic and atraumatic.      Right Ear: Tympanic membrane, ear canal and external ear normal.      Left Ear: Tympanic membrane, ear canal and external ear normal.       Nose: Nose normal.      Mouth/Throat:      Mouth: Mucous membranes are moist.      Pharynx: Oropharynx is clear.   Eyes:      Extraocular Movements: Extraocular movements intact.      Conjunctiva/sclera: Conjunctivae normal.      Pupils: Pupils are equal, round, and reactive to light.   Cardiovascular:      Rate and Rhythm: Normal rate. Rhythm irregular.      Pulses: Normal pulses.      Heart sounds: Normal heart sounds.   Pulmonary:      Effort: Pulmonary effort is normal.      Breath sounds: Normal breath sounds.   Musculoskeletal:         General: Normal range of motion.      Cervical back: Normal range of motion and neck supple.   Skin:     General: Skin is warm and dry.      Findings: Lesion present.      Comments: Left forearm with race, erythematous, scaly lesion with irregular borders   Neurological:      General: No focal deficit present.      Mental Status: He is alert and oriented to person, place, and time.   Psychiatric:         Mood and Affect: Mood normal.         Behavior: Behavior normal.         Thought Content: Thought content normal.         Judgment: Judgment normal.       Labs  Office Visit on 09/14/2022   Component Date Value Ref Range Status    Left Eye DM Retinopathy 10/13/2021 Negative   Final    Right Eye DM Retinopathy 10/13/2021 Negative   Final   Lab Visit on 09/12/2022   Component Date Value Ref Range Status    Urine Microalbumin 09/12/2022 6.6  <=30.0 ug/ml Final    Urine Creatinine 09/12/2022 117.9  63.0 - 166.0 mg/dL Final    Microalbumin Creatinine Ratio 09/12/2022 5.6  0.0 - 30.0 mg/gm Cr Final   Lab Visit on 09/12/2022   Component Date Value Ref Range Status    Sodium Level 09/12/2022 139  136 - 145 mmol/L Final    Potassium Level 09/12/2022 3.6  3.5 - 5.1 mmol/L Final    Chloride 09/12/2022 102  98 - 107 mmol/L Final    Carbon Dioxide 09/12/2022 25  23 - 31 mmol/L Final    Glucose Level 09/12/2022 213 (H)  82 - 115 mg/dL Final    Blood Urea Nitrogen 09/12/2022 17.0  8.4 - 25.7  mg/dL Final    Creatinine 09/12/2022 1.36 (H)  0.73 - 1.18 mg/dL Final    Calcium Level Total 09/12/2022 9.8  8.8 - 10.0 mg/dL Final    Protein Total 09/12/2022 7.4  5.8 - 7.6 gm/dL Final    Albumin Level 09/12/2022 3.9  3.4 - 4.8 gm/dL Final    Globulin 09/12/2022 3.5  2.4 - 3.5 gm/dL Final    Albumin/Globulin Ratio 09/12/2022 1.1  1.1 - 2.0 ratio Final    Bilirubin Total 09/12/2022 1.2  <=1.5 mg/dL Final    Alkaline Phosphatase 09/12/2022 69  40 - 150 unit/L Final    Alanine Aminotransferase 09/12/2022 15  0 - 55 unit/L Final    Aspartate Aminotransferase 09/12/2022 19  5 - 34 unit/L Final    eGFR 09/12/2022 54  mls/min/1.73/m2 Final    Hemoglobin A1c 09/12/2022 7.9 (H)  <=7.0 % Final    Estimated Average Glucose 09/12/2022 180.0  mg/dL Final    Cholesterol Total 09/12/2022 122  <=200 mg/dL Final    HDL Cholesterol 09/12/2022 32 (L)  35 - 60 mg/dL Final    Triglyceride 09/12/2022 101  34 - 140 mg/dL Final    Cholesterol/HDL Ratio 09/12/2022 4  0 - 5 Final    Very Low Density Lipoprotein 09/12/2022 20   Final    LDL Cholesterol 09/12/2022 70.00  50.00 - 140.00 mg/dL Final    Prostate Specific Antigen 09/12/2022 0.08  <=4.00 ng/mL Final    WBC 09/12/2022 8.3  4.5 - 11.5 x10(3)/mcL Final    RBC 09/12/2022 5.48  4.70 - 6.10 x10(6)/mcL Final    Hgb 09/12/2022 15.1  14.0 - 18.0 gm/dL Final    Hct 09/12/2022 48.5  42.0 - 52.0 % Final    MCV 09/12/2022 88.5  80.0 - 94.0 fL Final    MCH 09/12/2022 27.6  27.0 - 31.0 pg Final    MCHC 09/12/2022 31.1 (L)  33.0 - 36.0 mg/dL Final    RDW 09/12/2022 14.5  11.5 - 17.0 % Final    Platelet 09/12/2022 186  130 - 400 x10(3)/mcL Final    MPV 09/12/2022 10.1  7.4 - 10.4 fL Final    Neut % 09/12/2022 74.6  % Final    Lymph % 09/12/2022 16.4  % Final    Mono % 09/12/2022 6.0  % Final    Eos % 09/12/2022 2.7  % Final    Basophil % 09/12/2022 0.2  % Final    Lymph # 09/12/2022 1.36  0.6 - 4.6 x10(3)/mcL Final    Neut # 09/12/2022 6.2  2.1 - 9.2 x10(3)/mcL Final    Mono # 09/12/2022 0.50   0.1 - 1.3 x10(3)/mcL Final    Eos # 09/12/2022 0.22  0 - 0.9 x10(3)/mcL Final    Baso # 09/12/2022 0.02  0 - 0.2 x10(3)/mcL Final    IG# 09/12/2022 0.01  0 - 0.04 x10(3)/mcL Final    IG% 09/12/2022 0.1  % Final       Assessment and Plan     1. Type 2 diabetes mellitus with stage 3a chronic kidney disease, without long-term current use of insulin  Overview:  Glipizide 5 mg daily    09/14/2022 - add Tradjenta 5 mg daily    Assessment & Plan:  Hemoglobin A1c 7.9, add Tradjenta 5 mg daily, recheck 3 months with A1c in office.  Refuses foot exam.  Eye exam up-to-date.    Orders:  -     linaGLIPtin (TRADJENTA) 5 mg Tab tablet    2. Hyperlipidemia, unspecified hyperlipidemia type  Overview:  Rosuvastatin 20 mg daily    Assessment & Plan:  Total cholesterol 122, LDL 70, continue rosuvastatin 20 mg daily, recheck 6 months.      3. Hypertension, unspecified type  Overview:  Triamterene HCTZ 37.5/25 mg 2 capsules daily    Assessment & Plan:  Stable, continue current meds      4. Need for hepatitis C screening test  Overview:  Hep C screening test pending, patient does admit that about 20 or 25 years ago he was told that is hep C screening was positive but never got any other calls back from that doctor.  Wife with positive hep C screening now, awaiting confirmatory test.    Orders:  -     Hepatitis C Antibody    5. Restless legs syndrome  Overview:  Previously controlled with Mirapex 0.125 mg at bedtime, patient stopped after symptoms got better.    09/14/2022 - restart Mirapex 0.125 mg at bedtime    Assessment & Plan:  Follow-up 3 months    Orders:  -     pramipexole (MIRAPEX) 0.125 MG tablet    6. Skin lesion of left arm  Assessment & Plan:  Refer to Dr. Acuña    Orders:  -     Ambulatory referral/consult to Dermatology           Follow up in about 3 months (around 12/14/2022) for follow up Hgb A1c in office .

## 2022-09-14 NOTE — TELEPHONE ENCOUNTER
----- Message from Shobha Mcwilliams, CHARO sent at 9/14/2022  9:51 AM CDT -----  Regarding: Hep C add on  Please call [facility] lab and let them know I added a hep C screening test for him, he was drawn on the 12th.  It is important, his wife's is positive.  If they do not have enough blood I need to know so we can draw him while he is here.

## 2022-09-15 ENCOUNTER — TELEPHONE (OUTPATIENT)
Dept: FAMILY MEDICINE | Facility: CLINIC | Age: 77
End: 2022-09-15
Payer: MEDICARE

## 2022-11-07 RX ORDER — MUPIROCIN 20 MG/G
OINTMENT TOPICAL 2 TIMES DAILY
COMMUNITY
Start: 2022-10-14 | End: 2023-09-05

## 2022-12-06 ENCOUNTER — TELEPHONE (OUTPATIENT)
Dept: FAMILY MEDICINE | Facility: CLINIC | Age: 77
End: 2022-12-06
Payer: MEDICARE

## 2022-12-06 NOTE — TELEPHONE ENCOUNTER
Are there any outstanding tasks in patient's chart?  n    2. Do we have outstanding/pending referrals?  n    3. Has the patient been seen in an ER, Urgent Care, or admitted since last visit?  n    4. Has patient seen any other health care providers since last visit?  n    5.  Has patient had any blood work or x-rays done since last visit?   Will complete A1C at visit

## 2022-12-13 ENCOUNTER — DOCUMENTATION ONLY (OUTPATIENT)
Dept: FAMILY MEDICINE | Facility: CLINIC | Age: 77
End: 2022-12-13

## 2022-12-13 ENCOUNTER — OFFICE VISIT (OUTPATIENT)
Dept: FAMILY MEDICINE | Facility: CLINIC | Age: 77
End: 2022-12-13
Payer: MEDICARE

## 2022-12-13 VITALS
RESPIRATION RATE: 16 BRPM | BODY MASS INDEX: 42.66 KG/M2 | HEART RATE: 96 BPM | HEIGHT: 72 IN | OXYGEN SATURATION: 95 % | TEMPERATURE: 99 F | DIASTOLIC BLOOD PRESSURE: 76 MMHG | SYSTOLIC BLOOD PRESSURE: 124 MMHG | WEIGHT: 315 LBS

## 2022-12-13 DIAGNOSIS — E78.5 HYPERLIPIDEMIA, UNSPECIFIED HYPERLIPIDEMIA TYPE: ICD-10-CM

## 2022-12-13 DIAGNOSIS — Z85.51 HISTORY OF BLADDER CANCER: ICD-10-CM

## 2022-12-13 DIAGNOSIS — N18.31 TYPE 2 DIABETES MELLITUS WITH STAGE 3A CHRONIC KIDNEY DISEASE, WITHOUT LONG-TERM CURRENT USE OF INSULIN: Primary | ICD-10-CM

## 2022-12-13 DIAGNOSIS — I48.20 CHRONIC ATRIAL FIBRILLATION: ICD-10-CM

## 2022-12-13 DIAGNOSIS — Z23 NEED FOR INFLUENZA VACCINATION: ICD-10-CM

## 2022-12-13 DIAGNOSIS — E11.22 TYPE 2 DIABETES MELLITUS WITH STAGE 3A CHRONIC KIDNEY DISEASE, WITHOUT LONG-TERM CURRENT USE OF INSULIN: Primary | ICD-10-CM

## 2022-12-13 PROBLEM — N40.0 BPH (BENIGN PROSTATIC HYPERPLASIA): Status: ACTIVE | Noted: 2022-12-13

## 2022-12-13 PROBLEM — Z11.59 NEED FOR HEPATITIS C SCREENING TEST: Status: RESOLVED | Noted: 2022-09-14 | Resolved: 2022-12-13

## 2022-12-13 LAB — HBA1C MFR BLD: 8.6 % (ref 4.8–5.6)

## 2022-12-13 PROCEDURE — 90694 VACC AIIV4 NO PRSRV 0.5ML IM: CPT | Mod: ,,, | Performed by: NURSE PRACTITIONER

## 2022-12-13 PROCEDURE — G0008 FLU VACCINE - QUADRIVALENT - ADJUVANTED: ICD-10-PCS | Mod: ,,, | Performed by: NURSE PRACTITIONER

## 2022-12-13 PROCEDURE — 90694 FLU VACCINE - QUADRIVALENT - ADJUVANTED: ICD-10-PCS | Mod: ,,, | Performed by: NURSE PRACTITIONER

## 2022-12-13 PROCEDURE — G0008 ADMIN INFLUENZA VIRUS VAC: HCPCS | Mod: ,,, | Performed by: NURSE PRACTITIONER

## 2022-12-13 PROCEDURE — 99214 OFFICE O/P EST MOD 30 MIN: CPT | Mod: ,,, | Performed by: NURSE PRACTITIONER

## 2022-12-13 PROCEDURE — 83036 HEMOGLOBIN GLYCOSYLATED A1C: CPT | Mod: QW,,, | Performed by: NURSE PRACTITIONER

## 2022-12-13 PROCEDURE — 83036 POCT HEMOGLOBIN A1C: ICD-10-PCS | Mod: QW,,, | Performed by: NURSE PRACTITIONER

## 2022-12-13 PROCEDURE — 99214 PR OFFICE/OUTPT VISIT, EST, LEVL IV, 30-39 MIN: ICD-10-PCS | Mod: ,,, | Performed by: NURSE PRACTITIONER

## 2022-12-13 NOTE — ASSESSMENT & PLAN NOTE
Hemoglobin A1c up to 8.6.  Patient never started Tradjenta due to cost.  Would have been over 300 dollars a month for him.  Patient has been very reluctant to take anything but the glipizide.  Patient still not checking his blood sugars.  Encouraged him to check fasting blood sugar every morning.  Again stressed the importance of diabetic diet.  Will refer to endocrinology for further management.  Referral sent to Dr. Amos.

## 2022-12-13 NOTE — PROGRESS NOTES
Subjective:       Patient ID: Chris Mendoza II is a 77 y.o. male.    Chief Complaint: restless legs syndrome (3 month f/u), Diabetes (3 month f/u with A1C in office), and Abdominal Pain (Was constipated around Thanksgiving and had to strain and started with abdominal pain after that. Does have a sharp pain still in upper abdomen and is sore when getting up.)      HPI   This is a 77-year-old white male who presents to clinic today for three-month follow-up for type 2 diabetes.  Patient states that he never started the Tradjenta due to cost.  States it would have been about 300 dollars per month.  Again, has not been following a diabetic diet or checking his blood sugars.    Review of Systems  Comprehensive review of systems negative except as stated in HPI    The patient's Health Maintenance was reviewed and the following appears to be due:   Health Maintenance Due   Topic Date Due    Shingles Vaccine (2 of 3) 01/22/2013    COVID-19 Vaccine (5 - Booster for Moderna series) 06/23/2022    Eye Exam  01/12/2023       Past Medical History:  Past Medical History:   Diagnosis Date    Arthritis     Atrial fibrillation     Bladder cancer     CKD (chronic kidney disease)     Diabetes mellitus, type 2     Gout, unspecified     Hyperlipidemia     Hypertension     Restless leg syndrome     Sleep apnea, unspecified      Past Surgical History:   Procedure Laterality Date    BLADDER SURGERY      CARDIAC CATHETERIZATION      CARDIOVERSION      CATARACT EXTRACTION BILATERAL W/ ANTERIOR VITRECTOMY      JOINT REPLACEMENT Right     Total Hip    MULTIPLE TOOTH EXTRACTIONS      VEIN SURGERY       Review of patient's allergies indicates:   Allergen Reactions    Clindamycin Rash    Levofloxacin Hives     Current Outpatient Medications on File Prior to Visit   Medication Sig Dispense Refill    allopurinoL (ZYLOPRIM) 300 MG tablet Take 1 tablet by mouth twice daily (Patient taking differently: Take 300 mg by mouth once daily at 6am.) 60 tablet  5    doxazosin (CARDURA) 4 MG tablet Take 1 tablet by mouth once daily 90 tablet 2    ELIQUIS 5 mg Tab Take 5 mg by mouth 2 (two) times daily.      finasteride (PROSCAR) 5 mg tablet Take 5 mg by mouth once daily.      glipiZIDE (GLUCOTROL) 5 MG TR24 Take 5 mg by mouth once daily.      loratadine (CLARITIN) 10 mg tablet Take 10 mg by mouth.      rosuvastatin (CRESTOR) 20 MG tablet Take 20 mg by mouth every evening.      triamterene-hydrochlorothiazide 37.5-25 mg (DYAZIDE) 37.5-25 mg per capsule Take 2 capsules by mouth once daily 180 capsule 2    [DISCONTINUED] HYDROcodone-acetaminophen (NORCO)  mg per tablet Take 0.5-1 tablets by mouth every 6 (six) hours as needed.      [DISCONTINUED] traMADoL (ULTRAM) 50 mg tablet 2 (two) times daily as needed.      mupirocin (BACTROBAN) 2 % ointment Apply topically 2 (two) times daily.      pramipexole (MIRAPEX) 0.125 MG tablet Take 1 tablet (0.125 mg total) by mouth every evening. 90 tablet 3    [DISCONTINUED] linaGLIPtin (TRADJENTA) 5 mg Tab tablet Take 1 tablet (5 mg total) by mouth once daily. 90 tablet 3     No current facility-administered medications on file prior to visit.     Social History     Socioeconomic History    Marital status:    Tobacco Use    Smoking status: Never    Smokeless tobacco: Never   Substance and Sexual Activity    Alcohol use: Yes     Alcohol/week: 3.0 standard drinks     Types: 3 Shots of liquor per week     Comment: seldom    Drug use: Not Currently     Types: Marijuana    Sexual activity: Yes     Family History   Problem Relation Age of Onset    Coronary artery disease Mother     Cancer Father     Heart disease Father     Thyroid disease Sister        Objective:       /76 (BP Location: Left arm)   Pulse 96   Temp 98.9 °F (37.2 °C) (Oral)   Resp 16   Ht 6' (1.829 m)   Wt (!) 154.1 kg (339 lb 12.8 oz)   SpO2 95%   BMI 46.09 kg/m²      Physical Exam  Vitals and nursing note reviewed.   Constitutional:       Appearance:  Normal appearance. He is obese.   HENT:      Head: Normocephalic and atraumatic.   Eyes:      Extraocular Movements: Extraocular movements intact.      Conjunctiva/sclera: Conjunctivae normal.      Pupils: Pupils are equal, round, and reactive to light.   Cardiovascular:      Rate and Rhythm: Normal rate and regular rhythm.      Pulses: Normal pulses.      Heart sounds: Normal heart sounds.   Pulmonary:      Effort: Pulmonary effort is normal.      Breath sounds: Normal breath sounds.   Abdominal:      General: Abdomen is flat. Bowel sounds are normal.      Palpations: Abdomen is soft.      Tenderness: There is no abdominal tenderness.   Musculoskeletal:         General: Normal range of motion.      Cervical back: Normal range of motion and neck supple.   Skin:     General: Skin is warm and dry.   Neurological:      General: No focal deficit present.      Mental Status: He is alert and oriented to person, place, and time.   Psychiatric:         Mood and Affect: Mood normal.         Behavior: Behavior normal.         Thought Content: Thought content normal.         Judgment: Judgment normal.       Labs  Office Visit on 12/13/2022   Component Date Value Ref Range Status    Hemoglobin A1C 12/13/2022 8.6 (A)  4.8 - 5.6 % Final   Documentation Only on 12/13/2022   Component Date Value Ref Range Status    Left Eye DM Retinopathy 01/12/2022 Negative   Final    Right Eye DM Retinopathy 01/12/2022 Negative   Final   Office Visit on 09/14/2022   Component Date Value Ref Range Status    Left Eye DM Retinopathy 10/13/2021 Negative   Final    Right Eye DM Retinopathy 10/13/2021 Negative   Final   Lab Visit on 09/12/2022   Component Date Value Ref Range Status    Urine Microalbumin 09/12/2022 6.6  <=30.0 ug/ml Final    Urine Creatinine 09/12/2022 117.9  63.0 - 166.0 mg/dL Final    Microalbumin Creatinine Ratio 09/12/2022 5.6  0.0 - 30.0 mg/gm Cr Final   Lab Visit on 09/12/2022   Component Date Value Ref Range Status    Sodium  Level 09/12/2022 139  136 - 145 mmol/L Final    Potassium Level 09/12/2022 3.6  3.5 - 5.1 mmol/L Final    Chloride 09/12/2022 102  98 - 107 mmol/L Final    Carbon Dioxide 09/12/2022 25  23 - 31 mmol/L Final    Glucose Level 09/12/2022 213 (H)  82 - 115 mg/dL Final    Blood Urea Nitrogen 09/12/2022 17.0  8.4 - 25.7 mg/dL Final    Creatinine 09/12/2022 1.36 (H)  0.73 - 1.18 mg/dL Final    Calcium Level Total 09/12/2022 9.8  8.8 - 10.0 mg/dL Final    Protein Total 09/12/2022 7.4  5.8 - 7.6 gm/dL Final    Albumin Level 09/12/2022 3.9  3.4 - 4.8 gm/dL Final    Globulin 09/12/2022 3.5  2.4 - 3.5 gm/dL Final    Albumin/Globulin Ratio 09/12/2022 1.1  1.1 - 2.0 ratio Final    Bilirubin Total 09/12/2022 1.2  <=1.5 mg/dL Final    Alkaline Phosphatase 09/12/2022 69  40 - 150 unit/L Final    Alanine Aminotransferase 09/12/2022 15  0 - 55 unit/L Final    Aspartate Aminotransferase 09/12/2022 19  5 - 34 unit/L Final    eGFR 09/12/2022 54  mls/min/1.73/m2 Final    Hemoglobin A1c 09/12/2022 7.9 (H)  <=7.0 % Final    Estimated Average Glucose 09/12/2022 180.0  mg/dL Final    Cholesterol Total 09/12/2022 122  <=200 mg/dL Final    HDL Cholesterol 09/12/2022 32 (L)  35 - 60 mg/dL Final    Triglyceride 09/12/2022 101  34 - 140 mg/dL Final    Cholesterol/HDL Ratio 09/12/2022 4  0 - 5 Final    Very Low Density Lipoprotein 09/12/2022 20   Final    LDL Cholesterol 09/12/2022 70.00  50.00 - 140.00 mg/dL Final    Prostate Specific Antigen 09/12/2022 0.08  <=4.00 ng/mL Final    WBC 09/12/2022 8.3  4.5 - 11.5 x10(3)/mcL Final    RBC 09/12/2022 5.48  4.70 - 6.10 x10(6)/mcL Final    Hgb 09/12/2022 15.1  14.0 - 18.0 gm/dL Final    Hct 09/12/2022 48.5  42.0 - 52.0 % Final    MCV 09/12/2022 88.5  80.0 - 94.0 fL Final    MCH 09/12/2022 27.6  27.0 - 31.0 pg Final    MCHC 09/12/2022 31.1 (L)  33.0 - 36.0 mg/dL Final    RDW 09/12/2022 14.5  11.5 - 17.0 % Final    Platelet 09/12/2022 186  130 - 400 x10(3)/mcL Final    MPV 09/12/2022 10.1  7.4 - 10.4 fL  Final    Neut % 09/12/2022 74.6  % Final    Lymph % 09/12/2022 16.4  % Final    Mono % 09/12/2022 6.0  % Final    Eos % 09/12/2022 2.7  % Final    Basophil % 09/12/2022 0.2  % Final    Lymph # 09/12/2022 1.36  0.6 - 4.6 x10(3)/mcL Final    Neut # 09/12/2022 6.2  2.1 - 9.2 x10(3)/mcL Final    Mono # 09/12/2022 0.50  0.1 - 1.3 x10(3)/mcL Final    Eos # 09/12/2022 0.22  0 - 0.9 x10(3)/mcL Final    Baso # 09/12/2022 0.02  0 - 0.2 x10(3)/mcL Final    IG# 09/12/2022 0.01  0 - 0.04 x10(3)/mcL Final    IG% 09/12/2022 0.1  % Final    Hepatitis C Antibody 09/12/2022 Nonreactive  Nonreactive Final       Assessment and Plan       ICD-10-CM ICD-9-CM   1. Type 2 diabetes mellitus with stage 3a chronic kidney disease, without long-term current use of insulin  E11.22 250.40    N18.31 585.3   2. Need for influenza vaccination  Z23 V04.81   3. Hyperlipidemia, unspecified hyperlipidemia type  E78.5 272.4   4. Chronic atrial fibrillation  I48.20 427.31   5. History of bladder cancer  Z85.51 V10.51        1. Type 2 diabetes mellitus with stage 3a chronic kidney disease, without long-term current use of insulin  Overview:  Previously on Glipizide XL 2.5 mg daily    03/08/2022 - Hgb A1c 7.6, increase Glipizide XL to 5 mg daily  09/14/2022 - Hgb A1c 7.9, add Tradjenta 5 mg daily  12/13/2022 - never started Tradjenta due to cost. Increase Glipizide XL to 10 mg daily. Refer to Dr Amos     Assessment & Plan:  Hemoglobin A1c up to 8.6.  Patient never started Tradjenta due to cost.  Would have been over 300 dollars a month for him.  Patient has been very reluctant to take anything but the glipizide.  Patient still not checking his blood sugars.  Encouraged him to check fasting blood sugar every morning.  Again stressed the importance of diabetic diet.  Will refer to endocrinology for further management.  Referral sent to Dr. Amos.    Orders:  -     POCT HEMOGLOBIN A1C  -     Ambulatory referral/consult to Endocrinology; Future; Expected  date: 12/20/2022  -     CBC Auto Differential; Future; Expected date: 03/13/2023  -     Comprehensive Metabolic Panel; Future; Expected date: 03/13/2023  -     Hemoglobin A1C; Future; Expected date: 03/13/2023  -     Urinalysis; Future; Expected date: 03/13/2023    2. Need for influenza vaccination  -     Influenza (FLUAD) - Quadrivalent (Adjuvanted) *Preferred* (65+) (PF)    3. Hyperlipidemia, unspecified hyperlipidemia type  Overview:  Rosuvastatin 20 mg daily    Assessment & Plan:  Lipid panel in 3 months with wellness.    Orders:  -     CBC Auto Differential; Future; Expected date: 03/13/2023  -     Comprehensive Metabolic Panel; Future; Expected date: 03/13/2023  -     Lipid Panel; Future; Expected date: 03/13/2023    4. Chronic atrial fibrillation  Overview:  Dr Gama Chaney 5 mg twice daily    Assessment & Plan:  Stable, continue follow-up with Dr. Malloy, follow-up here in 3 months with labs prior.    Orders:  -     CBC Auto Differential; Future; Expected date: 03/13/2023    5. History of bladder cancer  Overview:  Previously followed by Dr Merchant     Assessment & Plan:  UA in 3 months    Orders:  -     CBC Auto Differential; Future; Expected date: 03/13/2023  -     Urinalysis; Future; Expected date: 03/13/2023           Follow up in 3 months (on 3/16/2023) for Annual.

## 2023-01-05 ENCOUNTER — TELEPHONE (OUTPATIENT)
Dept: FAMILY MEDICINE | Facility: CLINIC | Age: 78
End: 2023-01-05
Payer: MEDICARE

## 2023-01-05 DIAGNOSIS — R05.9 COUGH, UNSPECIFIED TYPE: Primary | ICD-10-CM

## 2023-01-05 RX ORDER — CODEINE PHOSPHATE AND GUAIFENESIN 10; 100 MG/5ML; MG/5ML
5 SOLUTION ORAL EVERY 6 HOURS PRN
Qty: 140 ML | Refills: 0 | Status: SHIPPED | OUTPATIENT
Start: 2023-01-05 | End: 2023-01-12

## 2023-01-05 NOTE — TELEPHONE ENCOUNTER
Patient is having complaints of a cough.  He is requesting a refill of Virtussin -10mg/5ml.  He has not been prescribed this medication since 7/6/2020.  [facility] in [place]

## 2023-03-08 ENCOUNTER — LAB VISIT (OUTPATIENT)
Dept: LAB | Facility: HOSPITAL | Age: 78
End: 2023-03-08
Attending: NURSE PRACTITIONER
Payer: MEDICARE

## 2023-03-08 DIAGNOSIS — I48.20 CHRONIC ATRIAL FIBRILLATION: ICD-10-CM

## 2023-03-08 DIAGNOSIS — Z85.51 HISTORY OF BLADDER CANCER: ICD-10-CM

## 2023-03-08 DIAGNOSIS — E11.22 TYPE 2 DIABETES MELLITUS WITH STAGE 3A CHRONIC KIDNEY DISEASE, WITHOUT LONG-TERM CURRENT USE OF INSULIN: ICD-10-CM

## 2023-03-08 DIAGNOSIS — E78.5 HYPERLIPIDEMIA, UNSPECIFIED HYPERLIPIDEMIA TYPE: ICD-10-CM

## 2023-03-08 DIAGNOSIS — N18.31 TYPE 2 DIABETES MELLITUS WITH STAGE 3A CHRONIC KIDNEY DISEASE, WITHOUT LONG-TERM CURRENT USE OF INSULIN: ICD-10-CM

## 2023-03-08 LAB
ALBUMIN SERPL-MCNC: 4.1 G/DL (ref 3.4–4.8)
ALBUMIN/GLOB SERPL: 1.4 RATIO (ref 1.1–2)
ALP SERPL-CCNC: 66 UNIT/L (ref 40–150)
ALT SERPL-CCNC: 16 UNIT/L (ref 0–55)
APPEARANCE UR: CLEAR
AST SERPL-CCNC: 16 UNIT/L (ref 5–34)
BACTERIA #/AREA URNS AUTO: NORMAL /HPF
BASOPHILS # BLD AUTO: 0.02 X10(3)/MCL (ref 0–0.2)
BASOPHILS NFR BLD AUTO: 0.3 %
BILIRUB UR QL STRIP.AUTO: NEGATIVE MG/DL
BILIRUBIN DIRECT+TOT PNL SERPL-MCNC: 1.1 MG/DL
BUN SERPL-MCNC: 15.7 MG/DL (ref 8.4–25.7)
CALCIUM SERPL-MCNC: 9.4 MG/DL (ref 8.8–10)
CHLORIDE SERPL-SCNC: 104 MMOL/L (ref 98–107)
CHOLEST SERPL-MCNC: 92 MG/DL
CHOLEST/HDLC SERPL: 3 {RATIO} (ref 0–5)
CO2 SERPL-SCNC: 24 MMOL/L (ref 23–31)
COLOR UR AUTO: YELLOW
CREAT SERPL-MCNC: 1.18 MG/DL (ref 0.73–1.18)
EOSINOPHIL # BLD AUTO: 0.11 X10(3)/MCL (ref 0–0.9)
EOSINOPHIL NFR BLD AUTO: 1.7 %
ERYTHROCYTE [DISTWIDTH] IN BLOOD BY AUTOMATED COUNT: 14.4 % (ref 11.5–17)
EST. AVERAGE GLUCOSE BLD GHB EST-MCNC: 142.7 MG/DL
GFR SERPLBLD CREATININE-BSD FMLA CKD-EPI: >60 MLS/MIN/1.73/M2
GLOBULIN SER-MCNC: 3 GM/DL (ref 2.4–3.5)
GLUCOSE SERPL-MCNC: 145 MG/DL (ref 82–115)
GLUCOSE UR QL STRIP.AUTO: NEGATIVE MG/DL
HBA1C MFR BLD: 6.6 %
HCT VFR BLD AUTO: 45 % (ref 42–52)
HDLC SERPL-MCNC: 34 MG/DL (ref 35–60)
HGB BLD-MCNC: 14.2 G/DL (ref 14–18)
IMM GRANULOCYTES # BLD AUTO: 0.01 X10(3)/MCL (ref 0–0.04)
IMM GRANULOCYTES NFR BLD AUTO: 0.2 %
KETONES UR QL STRIP.AUTO: NEGATIVE MG/DL
LDLC SERPL CALC-MCNC: 46 MG/DL (ref 50–140)
LEUKOCYTE ESTERASE UR QL STRIP.AUTO: NEGATIVE UNIT/L
LYMPHOCYTES # BLD AUTO: 1.12 X10(3)/MCL (ref 0.6–4.6)
LYMPHOCYTES NFR BLD AUTO: 17.1 %
MCH RBC QN AUTO: 27.3 PG
MCHC RBC AUTO-ENTMCNC: 31.6 G/DL (ref 33–36)
MCV RBC AUTO: 86.5 FL (ref 80–94)
MONOCYTES # BLD AUTO: 0.39 X10(3)/MCL (ref 0.1–1.3)
MONOCYTES NFR BLD AUTO: 6 %
NEUTROPHILS # BLD AUTO: 4.89 X10(3)/MCL (ref 2.1–9.2)
NEUTROPHILS NFR BLD AUTO: 74.7 %
NITRITE UR QL STRIP.AUTO: NEGATIVE
PH UR STRIP.AUTO: 6.5 [PH]
PLATELET # BLD AUTO: 194 X10(3)/MCL (ref 130–400)
PMV BLD AUTO: 10.1 FL (ref 7.4–10.4)
POTASSIUM SERPL-SCNC: 4 MMOL/L (ref 3.5–5.1)
PROT SERPL-MCNC: 7.1 GM/DL (ref 5.8–7.6)
PROT UR QL STRIP.AUTO: NEGATIVE MG/DL
RBC # BLD AUTO: 5.2 X10(6)/MCL (ref 4.7–6.1)
RBC #/AREA URNS AUTO: NORMAL /HPF
RBC UR QL AUTO: NEGATIVE UNIT/L
SODIUM SERPL-SCNC: 141 MMOL/L (ref 136–145)
SP GR UR STRIP.AUTO: 1.02
SQUAMOUS #/AREA URNS AUTO: NORMAL /HPF
TRIGL SERPL-MCNC: 61 MG/DL (ref 34–140)
UROBILINOGEN UR STRIP-ACNC: 1 MG/DL
VLDLC SERPL CALC-MCNC: 12 MG/DL
WBC # SPEC AUTO: 6.5 X10(3)/MCL (ref 4.5–11.5)
WBC #/AREA URNS AUTO: NORMAL /HPF

## 2023-03-08 PROCEDURE — 80053 COMPREHEN METABOLIC PANEL: CPT

## 2023-03-08 PROCEDURE — 36415 COLL VENOUS BLD VENIPUNCTURE: CPT

## 2023-03-08 PROCEDURE — 83036 HEMOGLOBIN GLYCOSYLATED A1C: CPT

## 2023-03-08 PROCEDURE — 85025 COMPLETE CBC W/AUTO DIFF WBC: CPT

## 2023-03-08 PROCEDURE — 80061 LIPID PANEL: CPT

## 2023-03-09 ENCOUNTER — TELEPHONE (OUTPATIENT)
Dept: FAMILY MEDICINE | Facility: CLINIC | Age: 78
End: 2023-03-09
Payer: MEDICARE

## 2023-03-09 NOTE — TELEPHONE ENCOUNTER
No answer/left message on 3/9/23 at 10:35 reminding patient about upcoming appointment.  Labs completed for visit.

## 2023-03-13 ENCOUNTER — TELEPHONE (OUTPATIENT)
Dept: FAMILY MEDICINE | Facility: CLINIC | Age: 78
End: 2023-03-13
Payer: MEDICARE

## 2023-03-13 DIAGNOSIS — M10.9 GOUT, UNSPECIFIED CAUSE, UNSPECIFIED CHRONICITY, UNSPECIFIED SITE: Primary | ICD-10-CM

## 2023-03-13 RX ORDER — COLCHICINE 0.6 MG/1
TABLET ORAL
Qty: 30 TABLET | Refills: 11 | Status: SHIPPED | OUTPATIENT
Start: 2023-03-13 | End: 2023-03-16

## 2023-03-13 RX ORDER — DICLOFENAC SODIUM 75 MG/1
75 TABLET, DELAYED RELEASE ORAL 2 TIMES DAILY PRN
Qty: 20 TABLET | Refills: 2 | Status: SHIPPED | OUTPATIENT
Start: 2023-03-13 | End: 2023-03-16

## 2023-03-13 NOTE — TELEPHONE ENCOUNTER
I sent in a prescription for colchicine.  He needs to stop the allopurinol and take the colchicine as directed for the gout flare.  Also, sent a prescription of diclofenac an anti-inflammatory for pain.

## 2023-03-13 NOTE — TELEPHONE ENCOUNTER
----- Message from Fady Ortiz sent at 3/13/2023  8:37 AM CDT -----  Regarding: call back  .Type:  Needs Medical Advice    Who Called: emile  Symptoms (please be specific): gout flares   How long has patient had these symptoms:  weekend  Pharmacy name and phone #:  [email or web address]  Would the patient rather a call back or a response via [facility]?   Best Call Back Number: [phone]  Additional Information: pt would like a call back he wants to be prescribed something for gout flare.

## 2023-03-16 ENCOUNTER — OFFICE VISIT (OUTPATIENT)
Dept: FAMILY MEDICINE | Facility: CLINIC | Age: 78
End: 2023-03-16
Payer: MEDICARE

## 2023-03-16 VITALS
OXYGEN SATURATION: 100 % | BODY MASS INDEX: 42.66 KG/M2 | TEMPERATURE: 98 F | HEIGHT: 72 IN | WEIGHT: 315 LBS | DIASTOLIC BLOOD PRESSURE: 72 MMHG | HEART RATE: 84 BPM | RESPIRATION RATE: 18 BRPM | SYSTOLIC BLOOD PRESSURE: 126 MMHG

## 2023-03-16 DIAGNOSIS — I10 PRIMARY HYPERTENSION: ICD-10-CM

## 2023-03-16 DIAGNOSIS — L57.0 ACTINIC KERATOSES: ICD-10-CM

## 2023-03-16 DIAGNOSIS — G89.29 CHRONIC LOW BACK PAIN, UNSPECIFIED BACK PAIN LATERALITY, UNSPECIFIED WHETHER SCIATICA PRESENT: ICD-10-CM

## 2023-03-16 DIAGNOSIS — N18.31 TYPE 2 DIABETES MELLITUS WITH STAGE 3A CHRONIC KIDNEY DISEASE, WITHOUT LONG-TERM CURRENT USE OF INSULIN: ICD-10-CM

## 2023-03-16 DIAGNOSIS — G25.81 RESTLESS LEGS SYNDROME: ICD-10-CM

## 2023-03-16 DIAGNOSIS — Z00.00 MEDICARE ANNUAL WELLNESS VISIT, SUBSEQUENT: Primary | ICD-10-CM

## 2023-03-16 DIAGNOSIS — E66.01 MORBID OBESITY: ICD-10-CM

## 2023-03-16 DIAGNOSIS — Z85.51 HISTORY OF BLADDER CANCER: ICD-10-CM

## 2023-03-16 DIAGNOSIS — N18.31 STAGE 3A CHRONIC KIDNEY DISEASE: ICD-10-CM

## 2023-03-16 DIAGNOSIS — M54.50 CHRONIC LOW BACK PAIN, UNSPECIFIED BACK PAIN LATERALITY, UNSPECIFIED WHETHER SCIATICA PRESENT: ICD-10-CM

## 2023-03-16 DIAGNOSIS — G47.30 SLEEP APNEA, UNSPECIFIED TYPE: ICD-10-CM

## 2023-03-16 DIAGNOSIS — I48.20 CHRONIC ATRIAL FIBRILLATION: ICD-10-CM

## 2023-03-16 DIAGNOSIS — Z71.89 ADVANCED CARE PLANNING/COUNSELING DISCUSSION: ICD-10-CM

## 2023-03-16 DIAGNOSIS — E78.5 HYPERLIPIDEMIA, UNSPECIFIED HYPERLIPIDEMIA TYPE: ICD-10-CM

## 2023-03-16 DIAGNOSIS — Z12.5 PROSTATE CANCER SCREENING: ICD-10-CM

## 2023-03-16 DIAGNOSIS — E11.22 TYPE 2 DIABETES MELLITUS WITH STAGE 3A CHRONIC KIDNEY DISEASE, WITHOUT LONG-TERM CURRENT USE OF INSULIN: ICD-10-CM

## 2023-03-16 PROBLEM — N18.9 CHRONIC KIDNEY DISEASE: Status: ACTIVE | Noted: 2023-03-16

## 2023-03-16 PROBLEM — Z53.20 SCREENING FOR MALIGNANT NEOPLASM OF COLON DECLINED: Status: ACTIVE | Noted: 2023-03-16

## 2023-03-16 PROCEDURE — G0439 PPPS, SUBSEQ VISIT: HCPCS | Mod: ,,, | Performed by: NURSE PRACTITIONER

## 2023-03-16 PROCEDURE — G0439 PR MEDICARE ANNUAL WELLNESS SUBSEQUENT VISIT: ICD-10-PCS | Mod: ,,, | Performed by: NURSE PRACTITIONER

## 2023-03-16 RX ORDER — GLIPIZIDE 10 MG/1
10 TABLET, FILM COATED, EXTENDED RELEASE ORAL
Qty: 90 TABLET | Refills: 3 | Status: SHIPPED | OUTPATIENT
Start: 2023-03-16 | End: 2024-03-26

## 2023-03-16 NOTE — ASSESSMENT & PLAN NOTE
Patient reports he stop the Mirapex because it was not helping.  Reports that his symptoms are not really restless legs but pain.  Still declines referral to pain management for management chronic lower back pain.

## 2023-03-16 NOTE — ASSESSMENT & PLAN NOTE
Stable, BUN 15.7, creatinine 1.18, EGFR greater than 60.  Continue to avoid NSAIDs.  Follow-up 6 months.

## 2023-03-16 NOTE — PROGRESS NOTES
Patient ID: [id number]     Chief Complaint: Medicare AWV      HPI:     Chris Mendoza II is a 77 y.o. male here today for a Medicare Wellness. No other complaints today.       ----------------------------  Arthritis  Atrial fibrillation  Bladder cancer  CKD (chronic kidney disease)  Diabetes mellitus, type 2  Gout, unspecified  Hyperlipidemia  Hypertension  Restless leg syndrome  Sleep apnea, unspecified     Past Surgical History:   Procedure Laterality Date    BLADDER SURGERY      CARDIAC CATHETERIZATION      CARDIOVERSION      CATARACT EXTRACTION BILATERAL W/ ANTERIOR VITRECTOMY      JOINT REPLACEMENT Right     Total Hip    MULTIPLE TOOTH EXTRACTIONS      VEIN SURGERY         Review of patient's allergies indicates:   Allergen Reactions    Clindamycin Rash    Levofloxacin Hives       Outpatient Medications Marked as Taking for the 3/16/23 encounter (Office Visit) with CHARO Espinosa   Medication Sig Dispense Refill    allopurinoL (ZYLOPRIM) 300 MG tablet Take 1 tablet by mouth twice daily (Patient taking differently: Take 300 mg by mouth once daily at 6am.) 60 tablet 5    doxazosin (CARDURA) 4 MG tablet Take 1 tablet by mouth once daily 90 tablet 2    ELIQUIS 5 mg Tab Take 5 mg by mouth 2 (two) times daily.      finasteride (PROSCAR) 5 mg tablet Take 5 mg by mouth once daily.      loratadine (CLARITIN) 10 mg tablet Take 10 mg by mouth.      mupirocin (BACTROBAN) 2 % ointment Apply topically 2 (two) times daily.      rosuvastatin (CRESTOR) 20 MG tablet Take 20 mg by mouth every evening.      triamterene-hydrochlorothiazide 37.5-25 mg (DYAZIDE) 37.5-25 mg per capsule Take 2 capsules by mouth once daily 180 capsule 2    [DISCONTINUED] glipiZIDE (GLUCOTROL) 5 MG TR24 Take 5 mg by mouth once daily.         Social History     Socioeconomic History    Marital status:    Tobacco Use    Smoking status: Never    Smokeless tobacco: Never   Substance and Sexual Activity    Alcohol use: Not Currently      Comment: seldom    Drug use: Not Currently     Types: Marijuana    Sexual activity: Yes        Family History   Problem Relation Age of Onset    Coronary artery disease Mother     Cancer Father     Heart disease Father     Thyroid disease Sister         Patient Care Team:  CHARO Espinosa as PCP - General (Nurse Practitioner)  Kenyon Lara II, MD as Consulting Physician (Ophthalmology)  Diogo Malloy MD as Consulting Physician (Cardiovascular Disease)  Anthony Miller OD (Optometry)  Jose Luna MD as Consulting Physician (Orthopedic Surgery)       Subjective:     Review of Systems   Constitutional: Negative.    HENT: Negative.     Eyes: Negative.    Respiratory: Negative.     Cardiovascular: Negative.    Gastrointestinal: Negative.    Genitourinary: Negative.    Musculoskeletal:  Positive for back pain and joint pain.   Skin: Negative.    Neurological: Negative.    Endo/Heme/Allergies: Negative.    Psychiatric/Behavioral: Negative.     All other systems reviewed and are negative.      Patient Reported Health Risk Assessment  What [age] 70-79  Are you male or female?: Male  During the past four weeks, how much have you been bothered by emotional problems such as feeling anxious, depressed, irritable, sad, or downhearted and blue?: Not at all  During the past five weeks, has your physical and/or emotional health limited your social activities with family, friends, neighbors, or groups?: Not at all  During the past four weeks, how much bodily pain have you generally had?: Moderate pain  During the past four weeks, was someone available to help if you needed and wanted help?: Yes, as much as I wanted  During the past four weeks, what was the hardest physical activity you could do for at least two minutes?: Moderate  Can you get to places out of walking distance without help?  (For example, can you travel alone on buses or taxis, or drive your own car?): Yes  Can you go shopping for  groceries or clothes without someone's help?: Yes  Can you prepare your own meals?: Yes  Can you do your own housework without help?: Yes  Because of any health problems, do you need the help of another person with your personal care needs such as eating, bathing, dressing, or getting around the house?: No  Can you handle your own money without help?: Yes  During the past four weeks, how would you rate your health in general?: Good  How have things been going for you during the past four weeks?: Pretty well  Are you having difficulties driving your car?: No  Do you always fasten your seat belt when you are in a car?: Yes, usually  How often in the past four weeks have you been bothered by falling or dizzy when standing up?: Never  How often in the past four weeks have you been bothered by sexual problems?: Never  How often in the past four weeks have you been bothered by trouble eating well?: Never  How often in the past four weeks have you been bothered by teeth or denture problems?: Often  How often in the past four weeks have you been bothered with problems using the telephone?: Never  How often in the past four weeks have you been bothered by tiredness or fatigue?: Never  Have you fallen two or more times in the past year?: No  Are you afraid of falling?: Yes  Are you a smoker?: No  During the past four weeks, how many drinks of wine, beer, or other alcoholic beverages did you have?: One drink or less per week  Do you exercise for about 20 minutes three or more days a week?: No, I usually do not exercise this much  Have you been given any information to help you with hazards in your house that might hurt you?: No  Have you been given any information to help you with keeping track of your medications?: No  How often do you have trouble taking medicines the way you've been told to take them?: I always take them as prescribed  How confident are you that you can control and manage most of your health problems?: Very  confident  What is your race? (Check all that apply.):     Opioid Screening: Patient medication list reviewed, patient is not taking prescription opioids. Patient is not using additional opioids than prescribed. Patient is at low risk of substance abuse based on this opioid use history.      Objective:     /72 (BP Location: Left arm)   Pulse 84   Temp 98.3 °F (36.8 °C) (Oral)   Resp 18   Ht 6' (1.829 m)   Wt (!) 155.9 kg (343 lb 12.8 oz)   SpO2 100%   BMI 46.63 kg/m²     Physical Exam  Vitals and nursing note reviewed.   Constitutional:       Appearance: Normal appearance. He is obese.   HENT:      Head: Normocephalic and atraumatic.      Right Ear: Tympanic membrane, ear canal and external ear normal.      Left Ear: Tympanic membrane, ear canal and external ear normal.      Nose: Nose normal.      Mouth/Throat:      Mouth: Mucous membranes are moist.      Pharynx: Oropharynx is clear.   Eyes:      Extraocular Movements: Extraocular movements intact.      Conjunctiva/sclera: Conjunctivae normal.      Pupils: Pupils are equal, round, and reactive to light.   Cardiovascular:      Rate and Rhythm: Normal rate and regular rhythm.      Pulses: Normal pulses.      Heart sounds: Normal heart sounds.   Pulmonary:      Effort: Pulmonary effort is normal.      Breath sounds: Normal breath sounds.   Abdominal:      General: Abdomen is flat. Bowel sounds are normal.      Palpations: Abdomen is soft.   Musculoskeletal:         General: Normal range of motion.      Cervical back: Normal range of motion and neck supple.      Right lower le+ Pitting Edema present.      Left lower le+ Pitting Edema present.   Skin:     General: Skin is warm and dry.   Neurological:      General: No focal deficit present.      Mental Status: He is alert and oriented to person, place, and time.   Psychiatric:         Mood and Affect: Mood normal.         Behavior: Behavior normal.         Thought Content: Thought content  normal.         Judgment: Judgment normal.         No flowsheet data found.  Fall Risk Assessment - Outpatient 3/16/2023 12/13/2022 9/14/2022 5/9/2022   Mobility Status Ambulatory Ambulatory Ambulatory w/ assistance Ambulatory w/ assistance   Number of falls 0 0 0 0   Identified as fall risk 0 0 0 0           Depression Screening  Over the past two weeks, has the patient felt down, depressed, or hopeless?: No  Over the past two weeks, has the patient felt little interest or pleasure in doing things?: No  Functional Ability/Safety Screening  Was the patient's timed Up & Go test unsteady or longer than 30 seconds?: Yes  Does the patient need help with phone, transportation, shopping, preparing meals, housework, laundry, meds, or managing money?: No  Does the patient's home have rugs in the hallway, lack grab bars in the bathroom, lack handrails on the stairs or have poor lighting?: No  Have you noticed any hearing difficulties?: No  Cognitive Function (Assessed through direct observation with due consideration of information obtained by way of patient reports and/or concerns raised by family, friends, caretakers, or others)    Does the patient repeat questions/statements in the same day?: No  Does the patient have trouble remembering the date, year, and time?: No  Does the patient have difficulty managing finances?: No  Does the patient have a decreased sense of direction?: No  Assessment and Plan       ICD-10-CM ICD-9-CM   1. Medicare annual wellness visit, subsequent  Z00.00 V70.0   2. Advanced care planning/counseling discussion  Z71.89 V65.49   3. Prostate cancer screening  Z12.5 V76.44   4. Type 2 diabetes mellitus with stage 3a chronic kidney disease, without long-term current use of insulin  E11.22 250.40    N18.31 585.3   5. Chronic atrial fibrillation  I48.20 427.31   6. Morbid obesity  E66.01 278.01   7. Primary hypertension  I10 401.9   8. Hyperlipidemia, unspecified hyperlipidemia type  E78.5 272.4   9.  Stage 3a chronic kidney disease  N18.31 585.3   10. Actinic keratoses  L57.0 702.0   11. Sleep apnea, unspecified type  G47.30 780.57   12. Restless legs syndrome  G25.81 333.94   13. Chronic low back pain, unspecified back pain laterality, unspecified whether sciatica present  M54.50 724.2    G89.29 338.29   14. History of bladder cancer  Z85.51 V10.51     1. Medicare annual wellness visit, subsequent  Overview:  Medicare annual wellness visit yearly in March      2. Advanced care planning/counseling discussion  Assessment & Plan:  Reports he has living will and healthcare power of  at home, encouraged patient to bring in to be scanned into chart.      3. Prostate cancer screening  Overview:  PSA yearly in September    Assessment & Plan:  PSA in 6 months.    Orders:  -     PSA, Screening; Future; Expected date: 09/16/2023    4. Type 2 diabetes mellitus with stage 3a chronic kidney disease, without long-term current use of insulin  Overview:  Previously on Glipizide XL 2.5 mg daily    03/08/2022 - Hgb A1c 7.6, increase Glipizide XL to 5 mg daily  09/14/2022 - Hgb A1c 7.9, add Tradjenta 5 mg daily  12/13/2022 - Hgb A1c 8.6, never started Tradjenta due to cost. Increase Glipizide XL to 10 mg daily. Refer to Dr Amos   03/16/2023 - Hgb A1c 6.6, never went to endocrinology, did not increase Glipizide to 10 mg daily, new RX sent today for Glipizide XL 10 mg daily      Assessment & Plan:  Hemoglobin A1c down.  Patient never increased his glipizide to 10 mg daily, never went to endocrinology.  States he just changed his diet and started drinking Coke Zero instead of regular Coke.  Encouraged patient to continue with dietary modifications.  Instructed him to actually increase his glipizide XL to 10 mg daily.  Follow-up 6 months.  Patient aware that he is overdue for his eye exam, he will call Dr. Miller to schedule.    Orders:  -     glipiZIDE (GLUCOTROL) 10 MG TR24; Take 1 tablet (10 mg total) by mouth daily  with breakfast.  Dispense: 90 tablet; Refill: 3  -     Comprehensive Metabolic Panel; Future; Expected date: 09/16/2023  -     Hemoglobin A1C; Future; Expected date: 09/16/2023  -     Microalbumin/Creatinine Ratio, Urine; Future; Expected date: 09/16/2023    5. Chronic atrial fibrillation  Overview:  Dr Gama Chaney 5 mg twice daily    Assessment & Plan:  Stable, continue follow-up with Dr. Malloy as scheduled.      6. Morbid obesity  Assessment & Plan:  Discussed lifestyle modification including reducing carbohydrates and calories in diet as well as increasing exercise/aerobic activity.  Recommend at least 30 minutes per day of aerobic exercise.      7. Primary hypertension  Overview:  Triamterene HCTZ 37.5/25 mg 2 capsules daily    Assessment & Plan:  Stable, continue triamterene/HCTZ, follow-up 6 months.    Orders:  -     Comprehensive Metabolic Panel; Future; Expected date: 09/16/2023    8. Hyperlipidemia, unspecified hyperlipidemia type  Overview:  Rosuvastatin 20 mg daily    Assessment & Plan:  Stable, total cholesterol 92, LDL 46.  Continue rosuvastatin 20 mg daily, follow-up 6 months.    Orders:  -     Comprehensive Metabolic Panel; Future; Expected date: 09/16/2023  -     Lipid Panel; Future; Expected date: 09/16/2023    9. Stage 3a chronic kidney disease  Assessment & Plan:  Stable, BUN 15.7, creatinine 1.18, EGFR greater than 60.  Continue to avoid NSAIDs.  Follow-up 6 months.    Orders:  -     Comprehensive Metabolic Panel; Future; Expected date: 09/16/2023    10. Actinic keratoses  Overview:  Followed by Dr Acuña dermatology    Assessment & Plan:  Saw Dr. Acuña, was not skin cancer.  Will follow-up with Dr. Acuña as needed.      11. Sleep apnea, unspecified type  Overview:  Sleep study years ago with Dr Alhaji Rosa CPAP, nasal mask     Assessment & Plan:  Encouraged patient to continue with nightly CPAP use.  Follow-up 6 months.      12. Restless legs syndrome  Overview:  Previously  controlled with Mirapex 0.125 mg at bedtime, patient stopped after symptoms got better.    09/14/2022 - restart Mirapex 0.125 mg at bedtime    03/15/2023 - stopped Mirapex, did not really help much    Assessment & Plan:  Patient reports he stop the Mirapex because it was not helping.  Reports that his symptoms are not really restless legs but pain.  Still declines referral to pain management for management chronic lower back pain.      13. Chronic low back pain, unspecified back pain laterality, unspecified whether sciatica present  Assessment & Plan:  Refuses referral to pain management.      14. History of bladder cancer  Overview:  Previously followed by Dr Merchant     Assessment & Plan:  UA with no blood.                Medicare Annual Wellness and Personalized Prevention Plan:   Fall Risk + Home Safety + Hearing Impairment + Depression Screen + Cognitive Impairment Screen + Health Risk Assessment all reviewed.       Health Maintenance Topics with due status: Not Due       Topic Last Completion Date    TETANUS VACCINE 08/19/2014    Diabetes Urine Screening 09/12/2022    Lipid Panel 03/08/2023    Hemoglobin A1c 03/08/2023      The patient's Health Maintenance was reviewed and the following appears to be due at this time:   Health Maintenance Due   Topic Date Due    Eye Exam  01/12/2023         Advance Care Planning   Advanced Care Planning: I offered to discuss advanced care planning, including how to pick a person who would make decisions for you if you were unable to make them for yourself, called a health care power of , and what kind of decisions you might make such as use of life sustaining treatments such as ventilators and tube feeding when faced with a life limiting illness recorded on a living will that they will need to know. (How you want to be cared for as you near the end of your natural life).  Patient has Advanced Directives and will provide our office with copies.  Spent 20 minutes with the  patient/family on the importance of Advanced Care Planning.        Opioid Screening: Patient medication list reviewed, patient is not taking prescription opioids. Patient is not using additional opioids than prescribed. Patient is at low risk of substance abuse based on this opioid use history.     Medication List with Changes/Refills   New Medications    GLIPIZIDE (GLUCOTROL) 10 MG TR24    Take 1 tablet (10 mg total) by mouth daily with breakfast.       Start Date: 3/16/2023 End Date: 3/15/2024   Current Medications    ALLOPURINOL (ZYLOPRIM) 300 MG TABLET    Take 1 tablet by mouth twice daily       Start Date: 8/29/2022 End Date: --    DOXAZOSIN (CARDURA) 4 MG TABLET    Take 1 tablet by mouth once daily       Start Date: 11/7/2022 End Date: --    ELIQUIS 5 MG TAB    Take 5 mg by mouth 2 (two) times daily.       Start Date: 3/7/2022  End Date: --    FINASTERIDE (PROSCAR) 5 MG TABLET    Take 5 mg by mouth once daily.       Start Date: 5/2/2022  End Date: --    LORATADINE (CLARITIN) 10 MG TABLET    Take 10 mg by mouth.       Start Date: --        End Date: --    MUPIROCIN (BACTROBAN) 2 % OINTMENT    Apply topically 2 (two) times daily.       Start Date: 10/14/2022End Date: --    ROSUVASTATIN (CRESTOR) 20 MG TABLET    Take 20 mg by mouth every evening.       Start Date: 3/7/2022  End Date: --    TRIAMTERENE-HYDROCHLOROTHIAZIDE 37.5-25 MG (DYAZIDE) 37.5-25 MG PER CAPSULE    Take 2 capsules by mouth once daily       Start Date: 10/19/2022End Date: --   Discontinued Medications    COLCHICINE (COLCRYS) 0.6 MG TABLET    Take 2 tablets by mouth once at first sign of gout flare the 1 tab by mouth 1 hour later. Stop allopurinol while in acute gout flare.       Start Date: 3/13/2023 End Date: 3/16/2023    DICLOFENAC (VOLTAREN) 75 MG EC TABLET    Take 1 tablet (75 mg total) by mouth 2 (two) times daily as needed (gout pain).       Start Date: 3/13/2023 End Date: 3/16/2023    GLIPIZIDE (GLUCOTROL) 5 MG TR24    Take 5 mg by mouth  once daily.       Start Date: 3/8/2022  End Date: 3/16/2023    PRAMIPEXOLE (MIRAPEX) 0.125 MG TABLET    Take 1 tablet (0.125 mg total) by mouth every evening.       Start Date: 9/14/2022 End Date: 3/16/2023        Follow up in about 6 months (around 9/16/2023) for follow up. In addition to their scheduled follow up, the patient has also been instructed to follow up on as needed basis.

## 2023-03-16 NOTE — ASSESSMENT & PLAN NOTE
Hemoglobin A1c down.  Patient never increased his glipizide to 10 mg daily, never went to endocrinology.  States he just changed his diet and started drinking Coke Zero instead of regular Coke.  Encouraged patient to continue with dietary modifications.  Instructed him to actually increase his glipizide XL to 10 mg daily.  Follow-up 6 months.  Patient aware that he is overdue for his eye exam, he will call Dr. Miller to schedule.

## 2023-03-16 NOTE — ASSESSMENT & PLAN NOTE
Reports he has living will and healthcare power of  at home, encouraged patient to bring in to be scanned into chart.

## 2023-03-16 NOTE — ASSESSMENT & PLAN NOTE
Discussed lifestyle modification including reducing carbohydrates and calories in diet as well as increasing exercise/aerobic activity.  Recommend at least 30 minutes per day of aerobic exercise.

## 2023-05-04 ENCOUNTER — TELEPHONE (OUTPATIENT)
Dept: FAMILY MEDICINE | Facility: CLINIC | Age: 78
End: 2023-05-04
Payer: MEDICARE

## 2023-05-04 NOTE — TELEPHONE ENCOUNTER
Yes, it is okay for him to take the keto fusion gummies.  I would like to remind him about the medication that I discussed with him for his diabetes that is also FDA approved to treat obesity.  I have many patients who have had significant weight loss after starting these medications.  I have discussed with him at past visits if he would be willing to start a weekly injection, it is not insulin, that is to treat diabetes but also to treat obesity.  The injection I am talking about is Ozempic if he would like to do some research on it.

## 2023-05-04 NOTE — TELEPHONE ENCOUNTER
Pt notified and verbalized understanding. He states the KETO gummies are supposed to help with lower blood sugar as well, but he will do some reading on the Ozempic and if he is interested will let us know.

## 2023-05-04 NOTE — TELEPHONE ENCOUNTER
The pt is trying to lose weight to be able to get a hip transplant. The pt saw on TV about these KETO gummies that you take while following the KETO diet that helps you burn fat. The brand he bought was the KETO Fusion gummies. Pt just wants to make sure these are safe to take along with his regular medications.

## 2023-05-04 NOTE — TELEPHONE ENCOUNTER
----- Message from Katy Altamirano sent at 5/4/2023  3:43 PM CDT -----  Regarding: call back  .Type:  Needs Medical Advice    Who Called: pt   Symptoms (please be specific):    How long has patient had these symptoms:    Pharmacy name and phone #:    Would the patient rather a call back or a response via [facility]? Call back  Best Call Back Number: [phone]  Additional Information: pt is requesting a call back about med ABL take a message per clinic

## 2023-05-16 ENCOUNTER — OFFICE VISIT (OUTPATIENT)
Dept: ORTHOPEDICS | Facility: CLINIC | Age: 78
End: 2023-05-16
Payer: MEDICARE

## 2023-05-16 ENCOUNTER — HOSPITAL ENCOUNTER (OUTPATIENT)
Dept: RADIOLOGY | Facility: CLINIC | Age: 78
Discharge: HOME | End: 2023-05-16
Attending: ORTHOPAEDIC SURGERY
Payer: MEDICARE

## 2023-05-16 VITALS
WEIGHT: 315 LBS | SYSTOLIC BLOOD PRESSURE: 157 MMHG | HEART RATE: 96 BPM | BODY MASS INDEX: 42.66 KG/M2 | DIASTOLIC BLOOD PRESSURE: 91 MMHG | HEIGHT: 72 IN

## 2023-05-16 DIAGNOSIS — M25.551 RIGHT HIP PAIN: ICD-10-CM

## 2023-05-16 DIAGNOSIS — M25.551 RIGHT HIP PAIN: Primary | ICD-10-CM

## 2023-05-16 DIAGNOSIS — M17.12 PRIMARY OSTEOARTHRITIS OF LEFT KNEE: ICD-10-CM

## 2023-05-16 DIAGNOSIS — Z96.649 FAILED TOTAL HIP ARTHROPLASTY, INITIAL ENCOUNTER: ICD-10-CM

## 2023-05-16 DIAGNOSIS — M16.12 PRIMARY OSTEOARTHRITIS OF LEFT HIP: ICD-10-CM

## 2023-05-16 DIAGNOSIS — T84.018A FAILED TOTAL HIP ARTHROPLASTY, INITIAL ENCOUNTER: ICD-10-CM

## 2023-05-16 PROCEDURE — 73502 X-RAY EXAM HIP UNI 2-3 VIEWS: CPT | Mod: RT,,, | Performed by: ORTHOPAEDIC SURGERY

## 2023-05-16 PROCEDURE — 99214 OFFICE O/P EST MOD 30 MIN: CPT | Mod: ,,, | Performed by: ORTHOPAEDIC SURGERY

## 2023-05-16 PROCEDURE — 99214 PR OFFICE/OUTPT VISIT, EST, LEVL IV, 30-39 MIN: ICD-10-PCS | Mod: ,,, | Performed by: ORTHOPAEDIC SURGERY

## 2023-05-16 PROCEDURE — 73502 XR HIP WITH PELVIS WHEN PERFORMED, 2 OR 3  VIEWS RIGHT: ICD-10-PCS | Mod: RT,,, | Performed by: ORTHOPAEDIC SURGERY

## 2023-05-16 NOTE — PROGRESS NOTES
Chief Complaint:   Chief Complaint   Patient presents with    Left Hip - Pain    Right Hip - Pain     KASSIDY hip pain. Prior sx on R hip. Left side is starting to hurt. Only hurts him when he walks too much. If he stands still for too long, pain is worse than when he keeps moving. Left hip pain radiates down to knee. Using cane. Has difficulty with mobility       History of present illness:  77-year-old male presents today for evaluation of painful left hip as well as left knee.  Patient has a history of total hip arthroplasty on the right side with revision for infection.  Patient has had any worse pain to his left hip.  It is located in his groin.  He is tried anti-inflammatories on occasion.  It was uses a cane.    Past Medical History:   Diagnosis Date    Arthritis     Atrial fibrillation     Bladder cancer     CKD (chronic kidney disease)     Diabetes mellitus, type 2     Gout, unspecified     Hyperlipidemia     Hypertension     Restless leg syndrome     Sleep apnea, unspecified        Past Surgical History:   Procedure Laterality Date    BLADDER SURGERY      CARDIAC CATHETERIZATION      CARDIOVERSION      CATARACT EXTRACTION BILATERAL W/ ANTERIOR VITRECTOMY      JOINT REPLACEMENT Right     Total Hip    MULTIPLE TOOTH EXTRACTIONS      VEIN SURGERY         Current Outpatient Medications   Medication Sig    allopurinoL (ZYLOPRIM) 300 MG tablet Take 1 tablet by mouth twice daily (Patient taking differently: Take 300 mg by mouth once daily at 6am.)    doxazosin (CARDURA) 4 MG tablet Take 1 tablet by mouth once daily    ELIQUIS 5 mg Tab Take 5 mg by mouth 2 (two) times daily.    finasteride (PROSCAR) 5 mg tablet Take 1 tablet by mouth once daily    glipiZIDE (GLUCOTROL) 10 MG TR24 Take 1 tablet (10 mg total) by mouth daily with breakfast.    loratadine (CLARITIN) 10 mg tablet Take 10 mg by mouth.    mupirocin (BACTROBAN) 2 % ointment Apply topically 2 (two) times daily.    rosuvastatin (CRESTOR) 20 MG tablet Take 20  mg by mouth every evening.    triamterene-hydrochlorothiazide 37.5-25 mg (DYAZIDE) 37.5-25 mg per capsule Take 2 capsules by mouth once daily     No current facility-administered medications for this visit.       Review of patient's allergies indicates:   Allergen Reactions    Clindamycin Rash    Levofloxacin Hives       Family History   Problem Relation Age of Onset    Coronary artery disease Mother     Cancer Father     Heart disease Father     Thyroid disease Sister        Social History     Socioeconomic History    Marital status:    Tobacco Use    Smoking status: Never    Smokeless tobacco: Never   Substance and Sexual Activity    Alcohol use: Not Currently     Comment: seldom    Drug use: Not Currently     Types: Marijuana    Sexual activity: Yes           Review of Systems:    Constitution: Negative for chills, fever, and sweats.  Negative for unexplained weight loss.    HENT:  Negative for headaches and blurry vision.    Cardiovascular:Negative for chest pain or irregular heart beat. Negative for hypertension.    Respiratory:  Negative for cough and shortness of breath.    Gastrointestinal: Negative for abdominal pain, heartburn, melena, nausea, and vomitting.    Genitourinary:  Negative bladder incontinence and dysuria.    Musculoskeletal:  See HPI    Neurological: Negative for numbness.    Psychiatric/Behavioral: Negative for depression.  The patient is not nervous/anxious.      Endocrine: Negative for polyuria    Hematologic/Lymphatic: Negative for bleeding problem.  Does not bruise/bleed easily.    Skin: Negative for poor would healing and rash      Physical Examination:    Vital Signs:    Vitals:    05/16/23 1509   BP: (!) 157/91   Pulse: 96       Body mass index is 45.84 kg/m².    General: No acute distress, alert and oriented, healthy appearing    HEENT: Head is atraumatic, mucous membranes are moist    Neck: Supples, no JVD    Cardiovascular: Palpable dorsalis pedis and posterior tibial  pulses, regular rate and rhythm to those pulses    Lungs: Breathing non-labored    Skin: no rashes appreciated    Neurologic: Can flex and extend knees, ankles, and toes. Sensation is grossly intact    Left hip:  Range of motion left hip with pain and discomfort.  Internal rotation causing groin pain.  Right hip:  Well-healed posterior incision.  Brisk cap refill distally.  Sensation intact distally.  Patient has no significant or severe pain with range of motion of the right hip  Left knee:  Varus alignment of the left knee.  Brisk cap refill distally.  Crepitus with range of motion.  He can get to extension of 5.  Flexion of 110    X-rays:  Three views of bilateral hips reviewed today.  Patient end-stage arthritis of the left hip.  Patient with a antibiotic spacer to the right hip.  It appears well fixed     Assessment::  Status post total hip on the right side with revision for antibiotic spacer placement  Left hip osteoarthritis  Left knee osteoarthritis    Plan:  The patient end-stage arthritis with the left hip as well as left knee.  His BMI currently precludes surgical intervention.  We will try Zilretta in his left knee as he has failed cortisone in the past with minimal to no relief.  With regards to his left hip, he is going to work on weight loss.  Right hip shows a well-fixed antibiotic spacer placement.  He does not wish any further intervention at the current time as his left hip is more symptomatic    This note was created using [employer] voice recognition software that occasionally misinterpreted phrases or words.    Consult note is delivered via Epic messaging service.

## 2023-06-06 ENCOUNTER — OFFICE VISIT (OUTPATIENT)
Dept: ORTHOPEDICS | Facility: CLINIC | Age: 78
End: 2023-06-06
Payer: MEDICARE

## 2023-06-06 VITALS
HEIGHT: 72 IN | BODY MASS INDEX: 42.66 KG/M2 | SYSTOLIC BLOOD PRESSURE: 115 MMHG | WEIGHT: 315 LBS | HEART RATE: 87 BPM | DIASTOLIC BLOOD PRESSURE: 75 MMHG

## 2023-06-06 DIAGNOSIS — M17.12 PRIMARY OSTEOARTHRITIS OF LEFT KNEE: Primary | ICD-10-CM

## 2023-06-06 PROCEDURE — 20610 DRAIN/INJ JOINT/BURSA W/O US: CPT | Mod: LT,,, | Performed by: NURSE PRACTITIONER

## 2023-06-06 PROCEDURE — 20610 LARGE JOINT ASPIRATION/INJECTION: L KNEE: ICD-10-PCS | Mod: LT,,, | Performed by: NURSE PRACTITIONER

## 2023-06-06 PROCEDURE — 99213 PR OFFICE/OUTPT VISIT, EST, LEVL III, 20-29 MIN: ICD-10-PCS | Mod: 25,,, | Performed by: NURSE PRACTITIONER

## 2023-06-06 PROCEDURE — 99213 OFFICE O/P EST LOW 20 MIN: CPT | Mod: 25,,, | Performed by: NURSE PRACTITIONER

## 2023-06-06 RX ORDER — BETAMETHASONE SODIUM PHOSPHATE AND BETAMETHASONE ACETATE 3; 3 MG/ML; MG/ML
12 INJECTION, SUSPENSION INTRA-ARTICULAR; INTRALESIONAL; INTRAMUSCULAR; SOFT TISSUE
Status: DISCONTINUED | OUTPATIENT
Start: 2023-06-06 | End: 2023-06-06 | Stop reason: HOSPADM

## 2023-06-06 RX ORDER — LIDOCAINE HYDROCHLORIDE 20 MG/ML
5 INJECTION, SOLUTION EPIDURAL; INFILTRATION; INTRACAUDAL; PERINEURAL
Status: DISCONTINUED | OUTPATIENT
Start: 2023-06-06 | End: 2023-06-06 | Stop reason: HOSPADM

## 2023-06-06 RX ADMIN — LIDOCAINE HYDROCHLORIDE 5 ML: 20 INJECTION, SOLUTION EPIDURAL; INFILTRATION; INTRACAUDAL; PERINEURAL at 01:06

## 2023-06-06 RX ADMIN — BETAMETHASONE SODIUM PHOSPHATE AND BETAMETHASONE ACETATE 12 MG: 3; 3 INJECTION, SUSPENSION INTRA-ARTICULAR; INTRALESIONAL; INTRAMUSCULAR; SOFT TISSUE at 01:06

## 2023-06-06 NOTE — PROGRESS NOTES
Chief Complaint:   Chief Complaint   Patient presents with    Injections     Pt states the last injection helped for 2-3 days about 2 years ago. Pt states he has been experiencing pain in his Lt knee and Lt hip which is intermittent and aggravates when he is walking or standing for too long. Pain is located in his knee cap and above.        History of present illness: Chris Mendoza II is a 77 y.o. male, presents to clinic today in regards to left knee pain.  Does have a longstanding history of osteoarthritis to left knee.  Has been treated in the past 2 years ago with cortisone injections.  They gave him relief but for a very limited amount of time.  Here today for a cortisone injection.  Patient was not approved through insurance for [facility] due to the length of time since his last cortisone injection.    Past Medical History:   Diagnosis Date    Arthritis     Atrial fibrillation     Bladder cancer     CKD (chronic kidney disease)     Diabetes mellitus, type 2     Gout, unspecified     Hyperlipidemia     Hypertension     Restless leg syndrome     Sleep apnea, unspecified        Past Surgical History:   Procedure Laterality Date    BLADDER SURGERY      CARDIAC CATHETERIZATION      CARDIOVERSION      CATARACT EXTRACTION BILATERAL W/ ANTERIOR VITRECTOMY      JOINT REPLACEMENT Right     Total Hip    MULTIPLE TOOTH EXTRACTIONS      VEIN SURGERY         Current Outpatient Medications   Medication Sig    allopurinoL (ZYLOPRIM) 300 MG tablet Take 1 tablet by mouth twice daily (Patient taking differently: Take 300 mg by mouth once daily at 6am.)    doxazosin (CARDURA) 4 MG tablet Take 1 tablet by mouth once daily    ELIQUIS 5 mg Tab Take 5 mg by mouth 2 (two) times daily.    finasteride (PROSCAR) 5 mg tablet Take 1 tablet by mouth once daily    glipiZIDE (GLUCOTROL) 10 MG TR24 Take 1 tablet (10 mg total) by mouth daily with breakfast.    loratadine (CLARITIN) 10 mg tablet Take 10 mg by mouth.    mupirocin (BACTROBAN) 2 %  ointment Apply topically 2 (two) times daily.    rosuvastatin (CRESTOR) 20 MG tablet Take 20 mg by mouth every evening.    triamterene-hydrochlorothiazide 37.5-25 mg (DYAZIDE) 37.5-25 mg per capsule Take 2 capsules by mouth once daily     No current facility-administered medications for this visit.       Review of patient's allergies indicates:   Allergen Reactions    Clindamycin Rash    Levofloxacin Hives       Family History   Problem Relation Age of Onset    Coronary artery disease Mother     Cancer Father     Heart disease Father     Thyroid disease Sister        Social History     Socioeconomic History    Marital status:    Tobacco Use    Smoking status: Never    Smokeless tobacco: Never   Substance and Sexual Activity    Alcohol use: Yes     Comment: 1-2 a week    Drug use: Not Currently     Types: Marijuana    Sexual activity: Yes     Partners: Female           Review of Systems:    Denies fevers, chills, chest pain, shortness of breath. Comprehensive review of systems performed and otherwise negative except as noted in HPI     Physical Examination:    General: awake and alert, no acute distress, healthy appearing  Head and Neck: Head atraumatic/normocephalic. Moist MM  CV: brisk cap refill  Lungs: non-labored breathing, w/o cough or SOB  Skin: no rashes present, warm to touch  Neuro: sensation grossly intact distally       Vital Signs:    Vitals:    06/06/23 1246   BP: 115/75   Pulse: 87       Body mass index is 46.93 kg/m².    Left knee:  Varus alignment of the left knee.  Brisk cap refill distally.  Crepitus with range of motion.  He can get to extension of 5.  Flexion of 110         Assessment::  Primary osteoarthritis left knee    Plan:  Patient presents to clinic today for injection.  Unfortunately we were unable to get him approved for Zilretta.  We will give him a regular injection today here in clinic.  Since it has been about 2 years we will have him follow up in about 6-8 weeks to reassess  his knee pain.  We also spoke about his weight loss.  At this time this not been successful and he is actually gained 10 lb from is keto diet pills that he was taking.  Encouraged him to follow up with the bariatric program here at [facility].  He does state understanding.  The patient agrees with plan of care.    This note was created using [employer] voice recognition software that occasionally misinterpreted phrases or words.    Consult note is delivered via Epic messaging service.

## 2023-06-06 NOTE — PROCEDURES
Large Joint Aspiration/Injection: L knee    Date/Time: 6/6/2023 1:00 PM  Performed by: CHARO Rosario  Authorized by: Jose Luna MD     Consent Done?:  Yes (Verbal)  Indications:  Arthritis and pain  Timeout: prior to procedure the correct patient, procedure, and site was verified    Prep: patient was prepped and draped in usual sterile fashion    Local anesthesia used?: No      Details:  Needle Size:  22 G  Ultrasonic Guidance for needle placement?: No    Approach:  Anterolateral  Location:  Knee  Site:  L knee  Medications:  5 mL LIDOcaine (PF) 20 mg/mL (2%) 20 mg/mL (2 %); 12 mg betamethasone acetate-betamethasone sodium phosphate 6 mg/mL  Patient tolerance:  Patient tolerated the procedure well with no immediate complications

## 2023-06-19 PROBLEM — Z00.00 MEDICARE ANNUAL WELLNESS VISIT, SUBSEQUENT: Status: RESOLVED | Noted: 2023-03-16 | Resolved: 2023-06-19

## 2023-07-31 ENCOUNTER — TELEPHONE (OUTPATIENT)
Dept: ORTHOPEDICS | Facility: CLINIC | Age: 78
End: 2023-07-31
Payer: MEDICARE

## 2023-09-05 ENCOUNTER — OFFICE VISIT (OUTPATIENT)
Dept: ORTHOPEDICS | Facility: CLINIC | Age: 78
End: 2023-09-05
Payer: MEDICARE

## 2023-09-05 VITALS
HEIGHT: 72 IN | DIASTOLIC BLOOD PRESSURE: 89 MMHG | HEART RATE: 88 BPM | WEIGHT: 315 LBS | SYSTOLIC BLOOD PRESSURE: 141 MMHG | BODY MASS INDEX: 42.66 KG/M2

## 2023-09-05 DIAGNOSIS — M17.11 PRIMARY OSTEOARTHRITIS OF RIGHT KNEE: Primary | ICD-10-CM

## 2023-09-05 DIAGNOSIS — M17.12 PRIMARY OSTEOARTHRITIS OF LEFT KNEE: ICD-10-CM

## 2023-09-05 PROCEDURE — 99213 PR OFFICE/OUTPT VISIT, EST, LEVL III, 20-29 MIN: ICD-10-PCS | Mod: ,,, | Performed by: ORTHOPAEDIC SURGERY

## 2023-09-05 PROCEDURE — 99213 OFFICE O/P EST LOW 20 MIN: CPT | Mod: ,,, | Performed by: ORTHOPAEDIC SURGERY

## 2023-09-05 NOTE — PROGRESS NOTES
Chief Complaint:   Chief Complaint   Patient presents with    Left Knee - Follow-up    Follow-up     F/U Lt knee cortisone injection 6/6/23 pt states knee is same no improvement injection helped only for 3-4 days, not taking pain meds,pt ambulating with a walker,       History of present illness:  78-year-old male presents for follow-up after injection into both knees.  Patient had about 1-2 days of relief with a with a cortisone injection into his left knee.  Right knee worked better although it was short-lived.  Continues to have significant complaints of pain to bilateral knees left is worse than right.    Past Medical History:   Diagnosis Date    Arthritis     Atrial fibrillation     Bladder cancer     CKD (chronic kidney disease)     Diabetes mellitus, type 2     Gout, unspecified     Hyperlipidemia     Hypertension     Restless leg syndrome     Sleep apnea, unspecified        Past Surgical History:   Procedure Laterality Date    BLADDER SURGERY      CARDIAC CATHETERIZATION      CARDIOVERSION      CATARACT EXTRACTION BILATERAL W/ ANTERIOR VITRECTOMY      EYE SURGERY      JOINT REPLACEMENT Right     Total Hip    MULTIPLE TOOTH EXTRACTIONS      VEIN SURGERY         Current Outpatient Medications   Medication Sig    allopurinoL (ZYLOPRIM) 300 MG tablet Take 1 tablet by mouth twice daily (Patient taking differently: Take 300 mg by mouth once daily at 6am.)    doxazosin (CARDURA) 4 MG tablet Take 1 tablet by mouth once daily    ELIQUIS 5 mg Tab Take 5 mg by mouth 2 (two) times daily.    finasteride (PROSCAR) 5 mg tablet Take 1 tablet by mouth once daily    glipiZIDE (GLUCOTROL) 10 MG TR24 Take 1 tablet (10 mg total) by mouth daily with breakfast.    loratadine (CLARITIN) 10 mg tablet Take 10 mg by mouth.    rosuvastatin (CRESTOR) 20 MG tablet Take 20 mg by mouth every evening.    triamterene-hydrochlorothiazide 37.5-25 mg (DYAZIDE) 37.5-25 mg per capsule Take 2 capsules by mouth once daily     No current  facility-administered medications for this visit.       Review of patient's allergies indicates:   Allergen Reactions    Clindamycin Rash    Levofloxacin Hives       Family History   Problem Relation Age of Onset    Coronary artery disease Mother     Cancer Father     Heart disease Father     Thyroid disease Sister        Social History     Socioeconomic History    Marital status:    Tobacco Use    Smoking status: Never    Smokeless tobacco: Never   Substance and Sexual Activity    Alcohol use: Yes     Comment: 1-2 a week    Drug use: Not Currently     Types: Marijuana    Sexual activity: Not Currently     Partners: Female           Review of Systems:    Constitution: Negative for chills, fever, and sweats.  Negative for unexplained weight loss.    HENT:  Negative for headaches and blurry vision.    Cardiovascular:Negative for chest pain or irregular heart beat. Negative for hypertension.    Respiratory:  Negative for cough and shortness of breath.    Gastrointestinal: Negative for abdominal pain, heartburn, melena, nausea, and vomitting.    Genitourinary:  Negative bladder incontinence and dysuria.    Musculoskeletal:  See HPI    Neurological: Negative for numbness.    Psychiatric/Behavioral: Negative for depression.  The patient is not nervous/anxious.      Endocrine: Negative for polyuria    Hematologic/Lymphatic: Negative for bleeding problem.  Does not bruise/bleed easily.    Skin: Negative for poor would healing and rash      Physical Examination:    Vital Signs:    Vitals:    09/05/23 0901   BP: (!) 141/89   Pulse: 88       Body mass index is 46.93 kg/m².    General: No acute distress, alert and oriented, healthy appearing    HEENT: Head is atraumatic, mucous membranes are moist    Neck: Supples, no JVD    Cardiovascular: Palpable dorsalis pedis and posterior tibial pulses, regular rate and rhythm to those pulses    Lungs: Breathing non-labored    Skin: no rashes appreciated    Neurologic: Can flex and  extend knees, ankles, and toes. Sensation is grossly intact    Bilateral knees:  Varus alignment of both knees.  Brisk cap refill disappeared sensation intact distally.  Range of motion of both his knees from 5-110.    X-rays:      Assessment::  Bilateral knee osteoarthritis    Plan:  Discussed all treatment options the patient.  He would like to try Zilretta to see if this improves his symptoms more long term.  The risks, benefits alternatives to injections were discussed in detail.  We will try to get him approved for Zilretta see him back when he is ready for his injections.    This note was created using [employer] voice recognition software that occasionally misinterpreted phrases or words.    Consult note is delivered via Epic messaging service.

## 2023-09-12 ENCOUNTER — LAB VISIT (OUTPATIENT)
Dept: LAB | Facility: HOSPITAL | Age: 78
End: 2023-09-12
Attending: NURSE PRACTITIONER
Payer: MEDICARE

## 2023-09-12 DIAGNOSIS — N18.31 STAGE 3A CHRONIC KIDNEY DISEASE: ICD-10-CM

## 2023-09-12 DIAGNOSIS — Z12.5 PROSTATE CANCER SCREENING: ICD-10-CM

## 2023-09-12 DIAGNOSIS — E78.5 HYPERLIPIDEMIA, UNSPECIFIED HYPERLIPIDEMIA TYPE: ICD-10-CM

## 2023-09-12 DIAGNOSIS — N18.31 TYPE 2 DIABETES MELLITUS WITH STAGE 3A CHRONIC KIDNEY DISEASE, WITHOUT LONG-TERM CURRENT USE OF INSULIN: ICD-10-CM

## 2023-09-12 DIAGNOSIS — E11.22 TYPE 2 DIABETES MELLITUS WITH STAGE 3A CHRONIC KIDNEY DISEASE, WITHOUT LONG-TERM CURRENT USE OF INSULIN: ICD-10-CM

## 2023-09-12 DIAGNOSIS — I10 PRIMARY HYPERTENSION: ICD-10-CM

## 2023-09-12 LAB
ALBUMIN SERPL-MCNC: 4 G/DL (ref 3.4–4.8)
ALBUMIN/GLOB SERPL: 1.1 RATIO (ref 1.1–2)
ALP SERPL-CCNC: 60 UNIT/L (ref 40–150)
ALT SERPL-CCNC: 16 UNIT/L (ref 0–55)
AST SERPL-CCNC: 16 UNIT/L (ref 5–34)
BILIRUB SERPL-MCNC: 0.9 MG/DL
BUN SERPL-MCNC: 14.4 MG/DL (ref 8.4–25.7)
CALCIUM SERPL-MCNC: 9.8 MG/DL (ref 8.8–10)
CHLORIDE SERPL-SCNC: 105 MMOL/L (ref 98–107)
CHOLEST SERPL-MCNC: 104 MG/DL
CHOLEST/HDLC SERPL: 3 {RATIO} (ref 0–5)
CO2 SERPL-SCNC: 24 MMOL/L (ref 23–31)
CREAT SERPL-MCNC: 1.37 MG/DL (ref 0.73–1.18)
CREAT UR-MCNC: 90.6 MG/DL (ref 63–166)
EST. AVERAGE GLUCOSE BLD GHB EST-MCNC: 180 MG/DL
GFR SERPLBLD CREATININE-BSD FMLA CKD-EPI: 53 MLS/MIN/1.73/M2
GLOBULIN SER-MCNC: 3.5 GM/DL (ref 2.4–3.5)
GLUCOSE SERPL-MCNC: 221 MG/DL (ref 82–115)
HBA1C MFR BLD: 7.9 %
HDLC SERPL-MCNC: 35 MG/DL (ref 35–60)
LDLC SERPL CALC-MCNC: 50 MG/DL (ref 50–140)
MICROALBUMIN UR-MCNC: <5 UG/ML
MICROALBUMIN/CREAT RATIO PNL UR: NORMAL
POTASSIUM SERPL-SCNC: 4 MMOL/L (ref 3.5–5.1)
PROT SERPL-MCNC: 7.5 GM/DL (ref 5.8–7.6)
PSA SERPL-MCNC: 0.14 NG/ML
SODIUM SERPL-SCNC: 142 MMOL/L (ref 136–145)
TRIGL SERPL-MCNC: 97 MG/DL (ref 34–140)
VLDLC SERPL CALC-MCNC: 19 MG/DL

## 2023-09-12 PROCEDURE — 36415 COLL VENOUS BLD VENIPUNCTURE: CPT

## 2023-09-12 PROCEDURE — 84153 ASSAY OF PSA TOTAL: CPT

## 2023-09-12 PROCEDURE — 82043 UR ALBUMIN QUANTITATIVE: CPT

## 2023-09-12 PROCEDURE — 80061 LIPID PANEL: CPT

## 2023-09-12 PROCEDURE — 83036 HEMOGLOBIN GLYCOSYLATED A1C: CPT

## 2023-09-12 PROCEDURE — 80053 COMPREHEN METABOLIC PANEL: CPT

## 2023-09-14 ENCOUNTER — TELEPHONE (OUTPATIENT)
Dept: FAMILY MEDICINE | Facility: CLINIC | Age: 78
End: 2023-09-14
Payer: MEDICARE

## 2023-09-14 NOTE — TELEPHONE ENCOUNTER
Are there any outstanding tasks in patient's chart?    n  2. Do we have outstanding/pending referrals?    n  3. Has the patient been seen in an ER, Urgent Care, or admitted since last visit?    n  4. Has patient seen any other health care providers since last visit?     n  5.  Has patient had any blood work or x-rays done since last visit?    Patient completed labs for visit at [facility]

## 2023-09-19 ENCOUNTER — OFFICE VISIT (OUTPATIENT)
Dept: ORTHOPEDICS | Facility: CLINIC | Age: 78
End: 2023-09-19
Payer: MEDICARE

## 2023-09-19 VITALS
HEART RATE: 91 BPM | BODY MASS INDEX: 42.66 KG/M2 | WEIGHT: 315 LBS | HEIGHT: 72 IN | DIASTOLIC BLOOD PRESSURE: 84 MMHG | SYSTOLIC BLOOD PRESSURE: 142 MMHG

## 2023-09-19 DIAGNOSIS — M17.11 PRIMARY OSTEOARTHRITIS OF RIGHT KNEE: Primary | ICD-10-CM

## 2023-09-19 DIAGNOSIS — M17.12 PRIMARY OSTEOARTHRITIS OF LEFT KNEE: ICD-10-CM

## 2023-09-19 PROCEDURE — 20610 LARGE JOINT ASPIRATION/INJECTION: BILATERAL KNEE: ICD-10-PCS | Mod: 50,,, | Performed by: NURSE PRACTITIONER

## 2023-09-19 PROCEDURE — 99213 PR OFFICE/OUTPT VISIT, EST, LEVL III, 20-29 MIN: ICD-10-PCS | Mod: 25,,, | Performed by: ORTHOPAEDIC SURGERY

## 2023-09-19 PROCEDURE — 99213 OFFICE O/P EST LOW 20 MIN: CPT | Mod: 25,,, | Performed by: ORTHOPAEDIC SURGERY

## 2023-09-19 PROCEDURE — 20610 DRAIN/INJ JOINT/BURSA W/O US: CPT | Mod: 50,,, | Performed by: NURSE PRACTITIONER

## 2023-09-19 RX ORDER — LIDOCAINE HYDROCHLORIDE 20 MG/ML
5 INJECTION, SOLUTION EPIDURAL; INFILTRATION; INTRACAUDAL; PERINEURAL
Status: DISCONTINUED | OUTPATIENT
Start: 2023-09-19 | End: 2023-09-19 | Stop reason: HOSPADM

## 2023-09-19 RX ORDER — DEXAMETHASONE SODIUM PHOSPHATE 4 MG/ML
2 INJECTION, SOLUTION INTRA-ARTICULAR; INTRALESIONAL; INTRAMUSCULAR; INTRAVENOUS; SOFT TISSUE
Status: DISCONTINUED | OUTPATIENT
Start: 2023-09-19 | End: 2023-09-19 | Stop reason: HOSPADM

## 2023-09-19 RX ADMIN — LIDOCAINE HYDROCHLORIDE 5 ML: 20 INJECTION, SOLUTION EPIDURAL; INFILTRATION; INTRACAUDAL; PERINEURAL at 01:09

## 2023-09-19 RX ADMIN — DEXAMETHASONE SODIUM PHOSPHATE 2 MG: 4 INJECTION, SOLUTION INTRA-ARTICULAR; INTRALESIONAL; INTRAMUSCULAR; INTRAVENOUS; SOFT TISSUE at 01:09

## 2023-09-19 NOTE — PROCEDURES
Large Joint Aspiration/Injection: bilateral knee    Date/Time: 9/19/2023 1:00 PM    Performed by: Little Contreras FNP  Authorized by: Jose Luna MD    Consent Done?:  Yes (Verbal)  Indications:  Arthritis and pain  Timeout: prior to procedure the correct patient, procedure, and site was verified    Prep: patient was prepped and draped in usual sterile fashion    Local anesthesia used?: No      Details:  Needle Size:  22 G  Ultrasonic Guidance for needle placement?: No    Approach:  Anterolateral  Location:  Knee  Laterality:  Bilateral  Site:  Bilateral knee  Medications (Right):  5 mL LIDOcaine (PF) 20 mg/mL (2%) 20 mg/mL (2 %); 2 mg dexAMETHasone 4 mg/mL  Medications (Left):  5 mL LIDOcaine (PF) 20 mg/mL (2%) 20 mg/mL (2 %); 2 mg dexAMETHasone 4 mg/mL  Patient tolerance:  Patient tolerated the procedure well with no immediate complications

## 2023-09-19 NOTE — PROGRESS NOTES
Chief Complaint:   Chief Complaint   Patient presents with    Follow-up     KASSIDY knee Zilretta inj was denied, getting DEXA inj today, patient wants to know if he can get it in both knees today, states the last inj he gotten only worked for two days       History of present illness: Chris Mendoza II is a 78 y.o. male, to clinic today in regards to bilateral knee pain.  He is received multiple injections in the past which she received a cortisone injection of betamethasone.  This only lasted for about 2 days.  Patient was then authorized for Zilretta injections in which insurance denied.  Here today for a 2nd type of cortisone which we he will receive dexamethasone.  Given these injections to help calm his pain down.  Patient does ambulate with the assistance of a cane.    Past Medical History:   Diagnosis Date    Arthritis     Atrial fibrillation     Bladder cancer     CKD (chronic kidney disease)     Diabetes mellitus, type 2     Gout, unspecified     Hyperlipidemia     Hypertension     Restless leg syndrome     Sleep apnea, unspecified        Past Surgical History:   Procedure Laterality Date    BLADDER SURGERY      CARDIAC CATHETERIZATION      CARDIOVERSION      CATARACT EXTRACTION BILATERAL W/ ANTERIOR VITRECTOMY      EYE SURGERY      JOINT REPLACEMENT Right     Total Hip    MULTIPLE TOOTH EXTRACTIONS      VEIN SURGERY         Current Outpatient Medications   Medication Sig    allopurinoL (ZYLOPRIM) 300 MG tablet Take 1 tablet by mouth twice daily (Patient taking differently: Take 300 mg by mouth once daily at 6am.)    doxazosin (CARDURA) 4 MG tablet Take 1 tablet by mouth once daily    ELIQUIS 5 mg Tab Take 5 mg by mouth 2 (two) times daily.    finasteride (PROSCAR) 5 mg tablet Take 1 tablet by mouth once daily    glipiZIDE (GLUCOTROL) 10 MG TR24 Take 1 tablet (10 mg total) by mouth daily with breakfast.    loratadine (CLARITIN) 10 mg tablet Take 10 mg by mouth.    rosuvastatin (CRESTOR) 20 MG tablet Take 20 mg  by mouth every evening.    triamterene-hydrochlorothiazide 37.5-25 mg (DYAZIDE) 37.5-25 mg per capsule Take 2 capsules by mouth once daily     No current facility-administered medications for this visit.       Review of patient's allergies indicates:   Allergen Reactions    Clindamycin Rash    Levofloxacin Hives       Family History   Problem Relation Age of Onset    Coronary artery disease Mother     Cancer Father     Heart disease Father     Thyroid disease Sister        Social History     Socioeconomic History    Marital status:    Tobacco Use    Smoking status: Never    Smokeless tobacco: Never   Substance and Sexual Activity    Alcohol use: Yes     Comment: 1-2 a week    Drug use: Not Currently     Types: Marijuana    Sexual activity: Not Currently     Partners: Female           Review of Systems:    Denies fevers, chills, chest pain, shortness of breath. Comprehensive review of systems performed and otherwise negative except as noted in HPI     Physical Examination:    General: awake and alert, no acute distress, healthy appearing  Head and Neck: Head atraumatic/normocephalic. Moist MM  CV: brisk cap refill  Lungs: non-labored breathing, w/o cough or SOB  Skin: no rashes present, warm to touch  Neuro: sensation grossly intact distally       Vital Signs:    Vitals:    09/19/23 1302   BP: (!) 142/84   Pulse: 91       Body mass index is 47.31 kg/m².    Bilateral knees:  Varus alignment of both knees.  Brisk cap refill disappeared sensation intact distally.  Range of motion of both his knees from 5-110.         Assessment::  Primary osteoarthritis bilateral knees    Plan:  Patient presents to clinic today in regards to bilateral knee pain.  We will give him a type of cortisone injection of dexamethasone.  Once these injections have been completed we will try to get him authorized for [facility] as he is now had 2 found regular cortisone injections.  Have the patient return in about 3 months.  States  understanding and agrees with plan of care.    This note was created using [employer] voice recognition software that occasionally misinterpreted phrases or words.    Consult note is delivered via Epic messaging service.

## 2023-09-21 ENCOUNTER — OFFICE VISIT (OUTPATIENT)
Dept: FAMILY MEDICINE | Facility: CLINIC | Age: 78
End: 2023-09-21
Payer: MEDICARE

## 2023-09-21 ENCOUNTER — TELEPHONE (OUTPATIENT)
Dept: FAMILY MEDICINE | Facility: CLINIC | Age: 78
End: 2023-09-21

## 2023-09-21 ENCOUNTER — CLINICAL SUPPORT (OUTPATIENT)
Dept: FAMILY MEDICINE | Facility: CLINIC | Age: 78
End: 2023-09-21
Attending: NURSE PRACTITIONER
Payer: MEDICARE

## 2023-09-21 VITALS
BODY MASS INDEX: 42.66 KG/M2 | RESPIRATION RATE: 18 BRPM | WEIGHT: 315 LBS | OXYGEN SATURATION: 94 % | TEMPERATURE: 98 F | HEIGHT: 72 IN | DIASTOLIC BLOOD PRESSURE: 70 MMHG | SYSTOLIC BLOOD PRESSURE: 122 MMHG | HEART RATE: 74 BPM

## 2023-09-21 DIAGNOSIS — I10 PRIMARY HYPERTENSION: Primary | ICD-10-CM

## 2023-09-21 DIAGNOSIS — N18.31 TYPE 2 DIABETES MELLITUS WITH STAGE 3A CHRONIC KIDNEY DISEASE, WITHOUT LONG-TERM CURRENT USE OF INSULIN: ICD-10-CM

## 2023-09-21 DIAGNOSIS — E11.22 TYPE 2 DIABETES MELLITUS WITH STAGE 3A CHRONIC KIDNEY DISEASE, WITHOUT LONG-TERM CURRENT USE OF INSULIN: ICD-10-CM

## 2023-09-21 DIAGNOSIS — G47.30 SLEEP APNEA, UNSPECIFIED TYPE: ICD-10-CM

## 2023-09-21 DIAGNOSIS — Z12.5 PROSTATE CANCER SCREENING: ICD-10-CM

## 2023-09-21 DIAGNOSIS — N18.31 STAGE 3A CHRONIC KIDNEY DISEASE: ICD-10-CM

## 2023-09-21 DIAGNOSIS — E78.5 HYPERLIPIDEMIA, UNSPECIFIED HYPERLIPIDEMIA TYPE: ICD-10-CM

## 2023-09-21 PROCEDURE — 99214 PR OFFICE/OUTPT VISIT, EST, LEVL IV, 30-39 MIN: ICD-10-PCS | Mod: ,,, | Performed by: NURSE PRACTITIONER

## 2023-09-21 PROCEDURE — 92228 PR REMOTE IMAGE RETINA, MONITOR/MANAGE ACTIVE DISEASE: ICD-10-PCS | Mod: TC,,, | Performed by: INTERNAL MEDICINE

## 2023-09-21 PROCEDURE — 92228 DIABETIC EYE SCREENING PHOTO: ICD-10-PCS | Mod: 26,,, | Performed by: OPHTHALMOLOGY

## 2023-09-21 PROCEDURE — 92228 IMG RTA DETC/MNTR DS PHY/QHP: CPT | Mod: 26,,, | Performed by: OPHTHALMOLOGY

## 2023-09-21 PROCEDURE — 92228 IMG RTA DETC/MNTR DS PHY/QHP: CPT | Mod: TC,,, | Performed by: INTERNAL MEDICINE

## 2023-09-21 PROCEDURE — 99214 OFFICE O/P EST MOD 30 MIN: CPT | Mod: ,,, | Performed by: NURSE PRACTITIONER

## 2023-09-21 RX ORDER — PIOGLITAZONEHYDROCHLORIDE 30 MG/1
30 TABLET ORAL DAILY
Qty: 90 TABLET | Refills: 3 | Status: SHIPPED | OUTPATIENT
Start: 2023-09-21 | End: 2024-09-20

## 2023-09-21 NOTE — ASSESSMENT & PLAN NOTE
Stable, BUN 14.4, creatinine 1.37, EGFR 53.  Encouraged continued avoidance of NSAIDs.  Recheck 3 months.

## 2023-09-21 NOTE — ASSESSMENT & PLAN NOTE
Reports that he does not feel like his CPAP is really working like it used to.  Wife states he is snoring around it.  Patient will call row tech and see if he can get compliance report.  Offered referral to sleep medicine but patient declines at this time.  Follow-up 3 months.

## 2023-09-21 NOTE — PROGRESS NOTES
Chris Mendoza II is a 78 y.o. male here for a diabetic eye screening with non-dilated fundus photos per CHARO Randhawa.    Patient cooperative?: Yes  Small pupils?: No  Last eye exam: 01/12/22    For exam results, see Encounter Report.

## 2023-09-21 NOTE — ASSESSMENT & PLAN NOTE
Hemoglobin A1c back up to 7.9.  Has received a steroid injection in his knee joint since last visit.  Continue glipizide XL 10 mg daily.  Add pioglitazone 30 mg daily.  Had a lengthy discussion today with patient about benefit of new or medications like Ozempic or Mounjaro.  Insurance has these medications as preferred but co-pay is still very expensive with his current insurance.  Patient given a copy of a patient assistance form and instructed to complete.  Once he has a completed he will return to office and we will send it in to see if we can get Ozempic through patient assistance program.  Return to clinic in 3 months with hemoglobin A1c prior.

## 2023-09-21 NOTE — PROGRESS NOTES
Subjective:       Patient ID: Chris Mendoza II is a 78 y.o. male.    Chief Complaint: Diabetes (6 month f/u), Hyperlipidemia (6 month f/u), Hypertension (6 month f/u), Chronic Kidney Disease (6 month f/u), and Sleep Apnea (6 month f/u)      HPI   This is a 78-year-old white male who presents to clinic today for a six-month follow-up for hypertension, hyperlipidemia, CKD, type 2 diabetes, sleep apnea.  Patient reports overall doing well.  Does report that he is still feeling like he is not sleeping well, feels like he is tired all the time.  Snoring around his CPAP.  Review of Systems  Comprehensive review of systems negative except as stated in HPI    The patient's Health Maintenance was reviewed and the following appears to be due:   Health Maintenance Due   Topic Date Due    Influenza Vaccine (1) 09/01/2023       Past Medical History:  Past Medical History:   Diagnosis Date    Arthritis     Atrial fibrillation     Bladder cancer     CKD (chronic kidney disease)     Diabetes mellitus, type 2     Gout, unspecified     Hyperlipidemia     Hypertension     Restless leg syndrome     Sleep apnea, unspecified      Past Surgical History:   Procedure Laterality Date    BLADDER SURGERY      CARDIAC CATHETERIZATION      CARDIOVERSION      CATARACT EXTRACTION BILATERAL W/ ANTERIOR VITRECTOMY      EYE SURGERY      JOINT REPLACEMENT Right     Total Hip    MULTIPLE TOOTH EXTRACTIONS      VEIN SURGERY       Review of patient's allergies indicates:   Allergen Reactions    Clindamycin Rash    Levofloxacin Hives     Current Outpatient Medications on File Prior to Visit   Medication Sig Dispense Refill    allopurinoL (ZYLOPRIM) 300 MG tablet Take 1 tablet by mouth twice daily (Patient taking differently: Take 300 mg by mouth once daily at 6am.) 60 tablet 5    doxazosin (CARDURA) 4 MG tablet Take 1 tablet by mouth once daily 90 tablet 2    ELIQUIS 5 mg Tab Take 5 mg by mouth 2 (two) times daily.      finasteride (PROSCAR) 5 mg tablet  Take 1 tablet by mouth once daily 90 tablet 2    glipiZIDE (GLUCOTROL) 10 MG TR24 Take 1 tablet (10 mg total) by mouth daily with breakfast. 90 tablet 3    loratadine (CLARITIN) 10 mg tablet Take 10 mg by mouth.      rosuvastatin (CRESTOR) 20 MG tablet Take 20 mg by mouth every evening.      triamterene-hydrochlorothiazide 37.5-25 mg (DYAZIDE) 37.5-25 mg per capsule Take 2 capsules by mouth once daily 180 capsule 2     No current facility-administered medications on file prior to visit.     Social History     Socioeconomic History    Marital status:    Tobacco Use    Smoking status: Never    Smokeless tobacco: Never   Substance and Sexual Activity    Alcohol use: Yes     Comment: 1-2 a week    Drug use: Not Currently     Types: Marijuana    Sexual activity: Not Currently     Partners: Female     Family History   Problem Relation Age of Onset    Coronary artery disease Mother     Cancer Father     Heart disease Father     Thyroid disease Sister        Objective:       /70 (BP Location: Right arm)   Pulse 74   Temp 97.8 °F (36.6 °C) (Oral)   Resp 18   Ht 6' (1.829 m)   Wt (!) 158.9 kg (350 lb 6.4 oz)   SpO2 (!) 94%   BMI 47.52 kg/m²      Physical Exam  Vitals and nursing note reviewed.   Constitutional:       Appearance: Normal appearance. He is obese.   HENT:      Head: Normocephalic and atraumatic.      Right Ear: Tympanic membrane, ear canal and external ear normal.      Left Ear: Tympanic membrane, ear canal and external ear normal.      Nose: Nose normal.      Mouth/Throat:      Mouth: Mucous membranes are moist.      Pharynx: Oropharynx is clear.   Eyes:      Extraocular Movements: Extraocular movements intact.      Conjunctiva/sclera: Conjunctivae normal.      Pupils: Pupils are equal, round, and reactive to light.   Cardiovascular:      Rate and Rhythm: Normal rate and regular rhythm.   Pulmonary:      Effort: Pulmonary effort is normal.      Breath sounds: Normal breath sounds.    Musculoskeletal:         General: Normal range of motion.      Cervical back: Normal range of motion and neck supple.      Comments: Ambulates with the assistance of cane   Skin:     General: Skin is warm and dry.   Neurological:      General: No focal deficit present.      Mental Status: He is alert and oriented to person, place, and time.   Psychiatric:         Mood and Affect: Mood normal.         Behavior: Behavior normal.         Thought Content: Thought content normal.         Judgment: Judgment normal.         Labs  Lab Visit on 09/12/2023   Component Date Value Ref Range Status    Sodium Level 09/12/2023 142  136 - 145 mmol/L Final    Potassium Level 09/12/2023 4.0  3.5 - 5.1 mmol/L Final    Chloride 09/12/2023 105  98 - 107 mmol/L Final    Carbon Dioxide 09/12/2023 24  23 - 31 mmol/L Final    Glucose Level 09/12/2023 221 (H)  82 - 115 mg/dL Final    Blood Urea Nitrogen 09/12/2023 14.4  8.4 - 25.7 mg/dL Final    Creatinine 09/12/2023 1.37 (H)  0.73 - 1.18 mg/dL Final    Calcium Level Total 09/12/2023 9.8  8.8 - 10.0 mg/dL Final    Protein Total 09/12/2023 7.5  5.8 - 7.6 gm/dL Final    Albumin Level 09/12/2023 4.0  3.4 - 4.8 g/dL Final    Globulin 09/12/2023 3.5  2.4 - 3.5 gm/dL Final    Albumin/Globulin Ratio 09/12/2023 1.1  1.1 - 2.0 ratio Final    Bilirubin Total 09/12/2023 0.9  <=1.5 mg/dL Final    Alkaline Phosphatase 09/12/2023 60  40 - 150 unit/L Final    Alanine Aminotransferase 09/12/2023 16  0 - 55 unit/L Final    Aspartate Aminotransferase 09/12/2023 16  5 - 34 unit/L Final    eGFR 09/12/2023 53  mls/min/1.73/m2 Final    Cholesterol Total 09/12/2023 104  <=200 mg/dL Final    HDL Cholesterol 09/12/2023 35  35 - 60 mg/dL Final    Triglyceride 09/12/2023 97  34 - 140 mg/dL Final    Cholesterol/HDL Ratio 09/12/2023 3  0 - 5 Final    Very Low Density Lipoprotein 09/12/2023 19   Final    LDL Cholesterol 09/12/2023 50.00  50.00 - 140.00 mg/dL Final    Hemoglobin A1c 09/12/2023 7.9 (H)  <=7.0 % Final     Estimated Average Glucose 09/12/2023 180.0  mg/dL Final    Prostate Specific Antigen 09/12/2023 0.14  <=4.00 ng/mL Final    Urine Microalbumin 09/12/2023 <5.0  <=30.0 ug/ml Final    Urine Creatinine 09/12/2023 90.6  63.0 - 166.0 mg/dL Final    Microalbumin Creatinine Ratio 09/12/2023    Final    Unable to calculate       Assessment and Plan       ICD-10-CM ICD-9-CM   1. Primary hypertension  I10 401.9   2. Hyperlipidemia, unspecified hyperlipidemia type  E78.5 272.4   3. Prostate cancer screening  Z12.5 V76.44   4. Stage 3a chronic kidney disease  N18.31 585.3   5. Type 2 diabetes mellitus with stage 3a chronic kidney disease, without long-term current use of insulin  E11.22 250.40    N18.31 585.3   6. Sleep apnea, unspecified type  G47.30 780.57        1. Primary hypertension  Overview:  Triamterene HCTZ 37.5/25 mg 2 capsules daily    Assessment & Plan:  Stable, continue current meds, follow-up 6 months.      2. Hyperlipidemia, unspecified hyperlipidemia type  Overview:  Rosuvastatin 20 mg daily    Assessment & Plan:  Stable, total cholesterol 104, LDL 50.  Continue rosuvastatin 20 mg daily, follow-up 6 months.      3. Prostate cancer screening  Overview:  PSA yearly in September    Assessment & Plan:  PSA 0.14, repeat 1 year.      4. Stage 3a chronic kidney disease  Assessment & Plan:  Stable, BUN 14.4, creatinine 1.37, EGFR 53.  Encouraged continued avoidance of NSAIDs.  Recheck 3 months.      Orders:  -     Hemoglobin A1C; Future; Expected date: 12/21/2023  -     Comprehensive Metabolic Panel; Future; Expected date: 12/21/2023    5. Type 2 diabetes mellitus with stage 3a chronic kidney disease, without long-term current use of insulin  Overview:  Previously on Glipizide XL 2.5 mg daily    03/08/2022 - Hgb A1c 7.6, increase Glipizide XL to 5 mg daily  09/14/2022 - Hgb A1c 7.9, add Tradjenta 5 mg daily  12/13/2022 - Hgb A1c 8.6, never started Tradjenta due to cost. Increase Glipizide XL to 10 mg daily. Refer to   [facility]   03/16/2023 - Hgb A1c 6.6, never went to endocrinology, did not increase Glipizide to 10 mg daily, new RX sent today for Glipizide XL 10 mg daily  09/21/2023 - Hgb A1c 7.9, add pioglitazone 30 mg daily      Assessment & Plan:  Hemoglobin A1c back up to 7.9.  Has received a steroid injection in his knee joint since last visit.  Continue glipizide XL 10 mg daily.  Add pioglitazone 30 mg daily.  Had a lengthy discussion today with patient about benefit of new or medications like Ozempic or Mounjaro.  Insurance has these medications as preferred but co-pay is still very expensive with his current insurance.  Patient given a copy of a patient assistance form and instructed to complete.  Once he has a completed he will return to office and we will send it in to see if we can get Ozempic through patient assistance program.  Return to clinic in 3 months with hemoglobin A1c prior.    Orders:  -     pioglitazone (ACTOS) 30 MG tablet; Take 1 tablet (30 mg total) by mouth once daily.  Dispense: 90 tablet; Refill: 3  -     Diabetic Eye Screening Photo; Future  -     Hemoglobin A1C; Future; Expected date: 12/21/2023  -     Comprehensive Metabolic Panel; Future; Expected date: 12/21/2023    6. Sleep apnea, unspecified type  Overview:  Sleep study years ago with Dr Alhaji Rosa CPAP, nasal mask     Assessment & Plan:  Reports that he does not feel like his CPAP is really working like it used to.  Wife states he is snoring around it.  Patient will call row tech and see if he can get compliance report.  Offered referral to sleep medicine but patient declines at this time.  Follow-up 3 months.             Follow up in about 3 months (around 12/21/2023) for follow up labs prior .

## 2023-09-21 NOTE — TELEPHONE ENCOUNTER
----- Message from CHARO Espinosa sent at 9/21/2023  1:16 PM CDT -----  Diabetic eye screening photos normal, no retinopathy.

## 2023-10-14 DIAGNOSIS — I10 PRIMARY HYPERTENSION: Primary | ICD-10-CM

## 2023-10-14 DIAGNOSIS — N40.0 BENIGN PROSTATIC HYPERPLASIA, UNSPECIFIED WHETHER LOWER URINARY TRACT SYMPTOMS PRESENT: ICD-10-CM

## 2023-10-16 RX ORDER — DOXAZOSIN 4 MG/1
TABLET ORAL
Qty: 90 TABLET | Refills: 1 | Status: SHIPPED | OUTPATIENT
Start: 2023-10-16

## 2023-10-16 RX ORDER — TRIAMTERENE AND HYDROCHLOROTHIAZIDE 37.5; 25 MG/1; MG/1
2 CAPSULE ORAL DAILY
Qty: 180 CAPSULE | Refills: 1 | Status: ON HOLD | OUTPATIENT
Start: 2023-10-16 | End: 2024-02-15 | Stop reason: HOSPADM

## 2023-11-03 ENCOUNTER — TELEPHONE (OUTPATIENT)
Dept: FAMILY MEDICINE | Facility: CLINIC | Age: 78
End: 2023-11-03
Payer: MEDICARE

## 2023-11-03 NOTE — TELEPHONE ENCOUNTER
----- Message from April Stewart sent at 11/3/2023  9:19 AM CDT -----  Regarding: nurse visit  .Type:  Needs Medical Advice    Who Called:  patient  Symptoms (please be specific):    How long has patient had these symptoms:    Pharmacy name and phone #:    Would the patient rather a call back or a response via [facility]? Call back  Best Call Back Number:  [phone]  Additional Information: patient would like to schedule his wife and himself for their flu vaccine. Please advise.

## 2023-11-06 ENCOUNTER — CLINICAL SUPPORT (OUTPATIENT)
Dept: FAMILY MEDICINE | Facility: CLINIC | Age: 78
End: 2023-11-06
Payer: MEDICARE

## 2023-11-06 DIAGNOSIS — Z23 NEED FOR INFLUENZA VACCINATION: Primary | ICD-10-CM

## 2023-11-06 PROCEDURE — 90694 VACC AIIV4 NO PRSRV 0.5ML IM: CPT | Mod: ,,, | Performed by: NURSE PRACTITIONER

## 2023-11-06 PROCEDURE — 90694 FLU VACCINE - QUADRIVALENT - ADJUVANTED: ICD-10-PCS | Mod: ,,, | Performed by: NURSE PRACTITIONER

## 2023-11-06 PROCEDURE — G0008 ADMIN INFLUENZA VIRUS VAC: HCPCS | Mod: ,,, | Performed by: NURSE PRACTITIONER

## 2023-11-06 PROCEDURE — G0008 FLU VACCINE - QUADRIVALENT - ADJUVANTED: ICD-10-PCS | Mod: ,,, | Performed by: NURSE PRACTITIONER

## 2023-11-06 NOTE — PROGRESS NOTES
The patient came in to the clinic for influenza vaccination. Pt tolerated well with no complaints.

## 2023-11-25 DIAGNOSIS — M10.9 GOUT, UNSPECIFIED CAUSE, UNSPECIFIED CHRONICITY, UNSPECIFIED SITE: Primary | ICD-10-CM

## 2023-11-27 RX ORDER — ALLOPURINOL 300 MG/1
TABLET ORAL
Qty: 180 TABLET | Refills: 1 | Status: SHIPPED | OUTPATIENT
Start: 2023-11-27

## 2023-12-05 ENCOUNTER — TELEPHONE (OUTPATIENT)
Dept: FAMILY MEDICINE | Facility: CLINIC | Age: 78
End: 2023-12-05
Payer: MEDICARE

## 2023-12-05 NOTE — TELEPHONE ENCOUNTER
----- Message from Carolin Senegal sent at 12/5/2023  1:08 PM CST -----  Regarding: Kinjal  .Type:  Needs Medical Advice    Who Called:  pt    Symptoms (please be specific): no     How long has patient had these symptoms:   no    Pharmacy name and phone #:   no    Would the patient rather a call back? Yes    Best Call Back Number:  [phone]    Additional Information:  pt ants to speak to Kinjal she is dealing with all his paper work for his medication

## 2023-12-21 ENCOUNTER — HOSPITAL ENCOUNTER (OUTPATIENT)
Dept: RADIOLOGY | Facility: CLINIC | Age: 78
Discharge: HOME | End: 2023-12-21
Attending: ORTHOPAEDIC SURGERY
Payer: MEDICARE

## 2023-12-21 ENCOUNTER — OFFICE VISIT (OUTPATIENT)
Dept: ORTHOPEDICS | Facility: CLINIC | Age: 78
End: 2023-12-21
Payer: MEDICARE

## 2023-12-21 VITALS
BODY MASS INDEX: 42.66 KG/M2 | HEIGHT: 72 IN | SYSTOLIC BLOOD PRESSURE: 142 MMHG | WEIGHT: 315 LBS | HEART RATE: 86 BPM | DIASTOLIC BLOOD PRESSURE: 72 MMHG

## 2023-12-21 DIAGNOSIS — M17.12 PRIMARY OSTEOARTHRITIS OF LEFT KNEE: ICD-10-CM

## 2023-12-21 DIAGNOSIS — M17.11 PRIMARY OSTEOARTHRITIS OF RIGHT KNEE: Primary | ICD-10-CM

## 2023-12-21 DIAGNOSIS — M17.11 PRIMARY OSTEOARTHRITIS OF RIGHT KNEE: ICD-10-CM

## 2023-12-21 PROCEDURE — 20610 DRAIN/INJ JOINT/BURSA W/O US: CPT | Mod: 50,,, | Performed by: ORTHOPAEDIC SURGERY

## 2023-12-21 PROCEDURE — 73564 X-RAY EXAM KNEE 4 OR MORE: CPT | Mod: 50,,, | Performed by: ORTHOPAEDIC SURGERY

## 2023-12-21 PROCEDURE — 20610 LARGE JOINT ASPIRATION/INJECTION: BILATERAL KNEE: ICD-10-PCS | Mod: 50,,, | Performed by: ORTHOPAEDIC SURGERY

## 2023-12-21 PROCEDURE — 99213 OFFICE O/P EST LOW 20 MIN: CPT | Mod: 25,,, | Performed by: ORTHOPAEDIC SURGERY

## 2023-12-21 PROCEDURE — 99213 PR OFFICE/OUTPT VISIT, EST, LEVL III, 20-29 MIN: ICD-10-PCS | Mod: 25,,, | Performed by: ORTHOPAEDIC SURGERY

## 2023-12-21 PROCEDURE — 73564 XR KNEE COMP 4 OR MORE VIEWS BILAT: ICD-10-PCS | Mod: 50,,, | Performed by: ORTHOPAEDIC SURGERY

## 2023-12-21 NOTE — PROCEDURES
Large Joint Aspiration/Injection: bilateral knee    Date/Time: 12/21/2023 10:45 AM    Performed by: Jose Luna MD  Authorized by: Jose Luna MD    Consent Done?:  Yes (Verbal)  Indications:  Arthritis, joint swelling and pain  Timeout: prior to procedure the correct patient, procedure, and site was verified    Prep: patient was prepped and draped in usual sterile fashion      Details:  Needle Size:  21 G  Approach:  Anterolateral  Location:  Knee  Laterality:  Bilateral  Site:  Bilateral knee  Medications (Right):  32 mg triamcinolone acetonide 32 mg  Medications (Left):  32 mg triamcinolone acetonide 32 mg  Patient tolerance:  Patient tolerated the procedure well with no immediate complications     No

## 2023-12-21 NOTE — PROGRESS NOTES
Chief Complaint:   Chief Complaint   Patient presents with    Left Knee - Injections    Right Knee - Injections     KASSIDY Knee zilretta injections.       History of present illness:  78-year-old male presents today for follow-up of bilateral knee osteoarthritis.  Patient has failed conservative management with multiple cortisone injections viscosupplementation.  Continues to have significant severe pain to the bilateral knees    Past Medical History:   Diagnosis Date    Arthritis     Atrial fibrillation     Bladder cancer     CKD (chronic kidney disease)     Diabetes mellitus, type 2     Gout, unspecified     Hyperlipidemia     Hypertension     Restless leg syndrome     Sleep apnea, unspecified        Past Surgical History:   Procedure Laterality Date    BLADDER SURGERY      CARDIAC CATHETERIZATION      CARDIOVERSION      CATARACT EXTRACTION BILATERAL W/ ANTERIOR VITRECTOMY      EYE SURGERY      JOINT REPLACEMENT Right     Total Hip    MULTIPLE TOOTH EXTRACTIONS      VEIN SURGERY         Current Outpatient Medications   Medication Sig    allopurinoL (ZYLOPRIM) 300 MG tablet Take 1 tablet by mouth twice daily    doxazosin (CARDURA) 4 MG tablet Take 1 tablet by mouth once daily    ELIQUIS 5 mg Tab Take 5 mg by mouth 2 (two) times daily.    finasteride (PROSCAR) 5 mg tablet Take 1 tablet by mouth once daily    glipiZIDE (GLUCOTROL) 10 MG TR24 Take 1 tablet (10 mg total) by mouth daily with breakfast.    loratadine (CLARITIN) 10 mg tablet Take 10 mg by mouth.    pioglitazone (ACTOS) 30 MG tablet Take 1 tablet (30 mg total) by mouth once daily.    rosuvastatin (CRESTOR) 20 MG tablet Take 20 mg by mouth every evening.    triamterene-hydrochlorothiazide 37.5-25 mg (DYAZIDE) 37.5-25 mg per capsule Take 2 capsules by mouth once daily     No current facility-administered medications for this visit.       Review of patient's allergies indicates:   Allergen Reactions    Clindamycin Rash    Levofloxacin Hives       Family  History   Problem Relation Age of Onset    Coronary artery disease Mother     Cancer Father     Heart disease Father     Thyroid disease Sister        Social History     Socioeconomic History    Marital status:    Tobacco Use    Smoking status: Never    Smokeless tobacco: Never   Substance and Sexual Activity    Alcohol use: Yes     Comment: 1-2 a week    Drug use: Not Currently     Types: Marijuana    Sexual activity: Not Currently     Partners: Female           Review of Systems:    Constitution: Negative for chills, fever, and sweats.  Negative for unexplained weight loss.    HENT:  Negative for headaches and blurry vision.    Cardiovascular:Negative for chest pain or irregular heart beat. Negative for hypertension.    Respiratory:  Negative for cough and shortness of breath.    Gastrointestinal: Negative for abdominal pain, heartburn, melena, nausea, and vomitting.    Genitourinary:  Negative bladder incontinence and dysuria.    Musculoskeletal:  See HPI    Neurological: Negative for numbness.    Psychiatric/Behavioral: Negative for depression.  The patient is not nervous/anxious.      Endocrine: Negative for polyuria    Hematologic/Lymphatic: Negative for bleeding problem.  Does not bruise/bleed easily.    Skin: Negative for poor would healing and rash      Physical Examination:    Vital Signs:    Vitals:    12/21/23 1050   BP: (!) 142/72   Pulse: 86       Body mass index is 47.47 kg/m².    General: No acute distress, alert and oriented, healthy appearing    HEENT: Head is atraumatic, mucous membranes are moist    Neck: Supples, no JVD    Cardiovascular: Palpable dorsalis pedis and posterior tibial pulses, regular rate and rhythm to those pulses    Lungs: Breathing non-labored    Skin: no rashes appreciated    Neurologic: Can flex and extend knees, ankles, and toes. Sensation is grossly intact    Bilateral knees:  Patient has fixed varus alignment of both knees.  Has brisk cap refill distally.  Sensation  intact distally.  Patient gets full extension.  Flexion of 95° bilaterally.  Varus alignment is not correctable    X-rays:  Four views of bilateral knees taken today and reviewed.  Patient end-stage osteoarthritis of the right greater than left knee.  He has full-thickness cartilage loss and bone-on-bone articulation     Assessment::  Bilateral knee osteoarthritis    Plan:  Discussed all treatment with the patient.  Patient with end-stage osteoarthritis of the knee.  We will plan Zilretta injections today.  See him back in 3 months repeat Zilretta injection.  He will contact us has not had these worked in the next 1-2 weeks    This note was created using [employer] voice recognition software that occasionally misinterpreted phrases or words.    Consult note is delivered via Epic messaging service.

## 2024-01-03 ENCOUNTER — TELEPHONE (OUTPATIENT)
Dept: FAMILY MEDICINE | Facility: CLINIC | Age: 79
End: 2024-01-03
Payer: MEDICARE

## 2024-01-03 NOTE — TELEPHONE ENCOUNTER
----- Message from Aneudy Goldstein sent at 1/3/2024  3:42 PM CST -----  .Type:  Needs Medical Advice    Who Called: pt  Symptoms (please be specific):    How long has patient had these symptoms:    Pharmacy name and phone #:    Would the patient rather a call back or a response via [facility]? Call back  Best Call Back Number: [phone]  Additional Information: pt calling to discuss ozempic dose,

## 2024-01-12 ENCOUNTER — TELEPHONE (OUTPATIENT)
Dept: FAMILY MEDICINE | Facility: CLINIC | Age: 79
End: 2024-01-12
Payer: MEDICARE

## 2024-01-12 NOTE — TELEPHONE ENCOUNTER
Are there any outstanding tasks in patient's chart?    labs  2. Do we have outstanding/pending referrals?    n  3. Has the patient been seen in an ER, Urgent Care, or admitted since last visit?    n  4. Has patient seen any other health care providers since last visit?    n  5.  Has patient had any blood work or x-rays done since last visit?   Will complete his labs at [facility] on 1/18/24

## 2024-01-17 ENCOUNTER — TELEPHONE (OUTPATIENT)
Dept: ORTHOPEDICS | Facility: CLINIC | Age: 79
End: 2024-01-17
Payer: MEDICARE

## 2024-01-17 NOTE — TELEPHONE ENCOUNTER
"Pt called stating he has a "temporary appliance" in his right hip and is having difficulties walking.    He sat on a bar stool for 2-3 hours almost a week ago, and when he stood up both of his legs from hip down went numb. He stated this took a while to resolve, but eventually did. He states that the right hip continued to hurt and has progressively gotten worse. He is unable to use a walker, and is using a cane. He states he is unable to put much weight on that leg. Pt denies falling or slipping.    He did state he had an old pain medication from a dentist appointment and has been taking those to help with the pain.    I informed the Pt I would send a message to [facility] but they are in surgery. I will call him back as soon as I hear back. He was requesting pain medication but I told him we typically only prescribe this for surgical patients post-operatively.    He states he is unsure if he can make it to the office for an appointment, but I informed him that it is likely we will need to see him to further evaluate the pain.    Please advise.  "

## 2024-01-22 DIAGNOSIS — N40.0 BENIGN PROSTATIC HYPERPLASIA, UNSPECIFIED WHETHER LOWER URINARY TRACT SYMPTOMS PRESENT: Primary | ICD-10-CM

## 2024-01-22 RX ORDER — FINASTERIDE 5 MG/1
TABLET, FILM COATED ORAL
Qty: 90 TABLET | Refills: 1 | Status: SHIPPED | OUTPATIENT
Start: 2024-01-22

## 2024-01-22 NOTE — TELEPHONE ENCOUNTER
Received call from patient stating he is still having pain and numbness to RLE, but states symptoms are improving. Informed him of appointment tomorrow at 8 am. Patient states he is unable to make appointment. He will discuss with wife and call back to  get appointment time scheduled.

## 2024-01-25 ENCOUNTER — LAB VISIT (OUTPATIENT)
Dept: LAB | Facility: HOSPITAL | Age: 79
End: 2024-01-25
Attending: ORTHOPAEDIC SURGERY
Payer: MEDICARE

## 2024-01-25 ENCOUNTER — HOSPITAL ENCOUNTER (OUTPATIENT)
Dept: RADIOLOGY | Facility: CLINIC | Age: 79
Discharge: HOME | End: 2024-01-25
Attending: NURSE PRACTITIONER
Payer: MEDICARE

## 2024-01-25 ENCOUNTER — OFFICE VISIT (OUTPATIENT)
Dept: ORTHOPEDICS | Facility: CLINIC | Age: 79
End: 2024-01-25
Payer: MEDICARE

## 2024-01-25 VITALS
WEIGHT: 315 LBS | BODY MASS INDEX: 42.66 KG/M2 | HEIGHT: 72 IN | SYSTOLIC BLOOD PRESSURE: 124 MMHG | DIASTOLIC BLOOD PRESSURE: 77 MMHG | HEART RATE: 58 BPM

## 2024-01-25 DIAGNOSIS — M25.551 CHRONIC PAIN OF RIGHT HIP: ICD-10-CM

## 2024-01-25 DIAGNOSIS — T84.018A FAILED TOTAL HIP ARTHROPLASTY, INITIAL ENCOUNTER: ICD-10-CM

## 2024-01-25 DIAGNOSIS — M25.551 CHRONIC PAIN OF RIGHT HIP: Primary | ICD-10-CM

## 2024-01-25 DIAGNOSIS — G89.29 CHRONIC PAIN OF RIGHT HIP: Primary | ICD-10-CM

## 2024-01-25 DIAGNOSIS — Z96.649 FAILED TOTAL HIP ARTHROPLASTY, INITIAL ENCOUNTER: ICD-10-CM

## 2024-01-25 DIAGNOSIS — G89.29 CHRONIC PAIN OF RIGHT HIP: ICD-10-CM

## 2024-01-25 LAB
BASOPHILS # BLD AUTO: 0.03 X10(3)/MCL
BASOPHILS NFR BLD AUTO: 0.4 %
CRP SERPL HS-MCNC: 47.28 MG/L
EOSINOPHIL # BLD AUTO: 0.08 X10(3)/MCL (ref 0–0.9)
EOSINOPHIL NFR BLD AUTO: 1.1 %
ERYTHROCYTE [DISTWIDTH] IN BLOOD BY AUTOMATED COUNT: 14.6 % (ref 11.5–17)
ERYTHROCYTE [SEDIMENTATION RATE] IN BLOOD: 70 MM/HR (ref 0–15)
HCT VFR BLD AUTO: 44.6 % (ref 42–52)
HGB BLD-MCNC: 14.3 G/DL (ref 14–18)
IMM GRANULOCYTES # BLD AUTO: 0.03 X10(3)/MCL (ref 0–0.04)
IMM GRANULOCYTES NFR BLD AUTO: 0.4 %
LYMPHOCYTES # BLD AUTO: 0.88 X10(3)/MCL (ref 0.6–4.6)
LYMPHOCYTES NFR BLD AUTO: 11.6 %
MCH RBC QN AUTO: 28.2 PG (ref 27–31)
MCHC RBC AUTO-ENTMCNC: 32.1 G/DL (ref 33–36)
MCV RBC AUTO: 88 FL (ref 80–94)
MONOCYTES # BLD AUTO: 0.44 X10(3)/MCL (ref 0.1–1.3)
MONOCYTES NFR BLD AUTO: 5.8 %
NEUTROPHILS # BLD AUTO: 6.12 X10(3)/MCL (ref 2.1–9.2)
NEUTROPHILS NFR BLD AUTO: 80.7 %
NRBC BLD AUTO-RTO: 0 %
PLATELET # BLD AUTO: 314 X10(3)/MCL (ref 130–400)
PMV BLD AUTO: 9.5 FL (ref 7.4–10.4)
RBC # BLD AUTO: 5.07 X10(6)/MCL (ref 4.7–6.1)
WBC # SPEC AUTO: 7.58 X10(3)/MCL (ref 4.5–11.5)

## 2024-01-25 PROCEDURE — 85025 COMPLETE CBC W/AUTO DIFF WBC: CPT

## 2024-01-25 PROCEDURE — 73502 X-RAY EXAM HIP UNI 2-3 VIEWS: CPT | Mod: RT,,, | Performed by: NURSE PRACTITIONER

## 2024-01-25 PROCEDURE — 99213 OFFICE O/P EST LOW 20 MIN: CPT | Mod: ,,, | Performed by: ORTHOPAEDIC SURGERY

## 2024-01-25 PROCEDURE — 36415 COLL VENOUS BLD VENIPUNCTURE: CPT

## 2024-01-25 PROCEDURE — 85652 RBC SED RATE AUTOMATED: CPT

## 2024-01-25 PROCEDURE — 86141 C-REACTIVE PROTEIN HS: CPT

## 2024-01-25 RX ORDER — HYDROCODONE BITARTRATE AND ACETAMINOPHEN 5; 325 MG/1; MG/1
1 TABLET ORAL EVERY 6 HOURS PRN
Qty: 28 TABLET | Refills: 0 | Status: ON HOLD | OUTPATIENT
Start: 2024-01-25 | End: 2024-04-11 | Stop reason: HOSPADM

## 2024-01-25 RX ORDER — SEMAGLUTIDE 1.34 MG/ML
INJECTION, SOLUTION SUBCUTANEOUS
COMMUNITY

## 2024-01-25 NOTE — PROGRESS NOTES
Chief Complaint:   Chief Complaint   Patient presents with    Right Hip - Pain     Pt states he has been experiencing pain in his Rt hip which unable him to stand or weight bear on it. Pt states he feels like 2 wires giving an electric shock that radiates into his knee and thigh. Pt reports an episode of numbness in both legs.       History of present illness:  78-year-old male presents today for evaluation follow-up of right hip.  Patient has history of revision of the right hip.  He had significant worsening of his pain last 1-2 weeks.  Patient is to ambulate without any pain.  Does have some shocking pain as well.    Past Medical History:   Diagnosis Date    Arthritis     Atrial fibrillation     Bladder cancer     CKD (chronic kidney disease)     Diabetes mellitus, type 2     Gout, unspecified     Hyperlipidemia     Hypertension     Restless leg syndrome     Sleep apnea, unspecified        Past Surgical History:   Procedure Laterality Date    BLADDER SURGERY      CARDIAC CATHETERIZATION      CARDIOVERSION      CATARACT EXTRACTION BILATERAL W/ ANTERIOR VITRECTOMY      EYE SURGERY      JOINT REPLACEMENT Right     Total Hip    MULTIPLE TOOTH EXTRACTIONS      VEIN SURGERY         Current Outpatient Medications   Medication Sig    allopurinoL (ZYLOPRIM) 300 MG tablet Take 1 tablet by mouth twice daily    doxazosin (CARDURA) 4 MG tablet Take 1 tablet by mouth once daily    ELIQUIS 5 mg Tab Take 5 mg by mouth 2 (two) times daily.    finasteride (PROSCAR) 5 mg tablet Take 1 tablet by mouth once daily    glipiZIDE (GLUCOTROL) 10 MG TR24 Take 1 tablet (10 mg total) by mouth daily with breakfast.    loratadine (CLARITIN) 10 mg tablet Take 10 mg by mouth.    pioglitazone (ACTOS) 30 MG tablet Take 1 tablet (30 mg total) by mouth once daily.    rosuvastatin (CRESTOR) 20 MG tablet Take 20 mg by mouth every evening.    semaglutide (OZEMPIC) 0.25 mg or 0.5 mg(2 mg/1.5 mL) pen injector Inject into the skin every 7 days.     triamterene-hydrochlorothiazide 37.5-25 mg (DYAZIDE) 37.5-25 mg per capsule Take 2 capsules by mouth once daily    HYDROcodone-acetaminophen (NORCO) 5-325 mg per tablet Take 1 tablet by mouth every 6 (six) hours as needed for Pain.     No current facility-administered medications for this visit.       Review of patient's allergies indicates:   Allergen Reactions    Clindamycin Rash    Levofloxacin Hives       Family History   Problem Relation Age of Onset    Coronary artery disease Mother     Cancer Father     Heart disease Father     Thyroid disease Sister        Social History     Socioeconomic History    Marital status:    Tobacco Use    Smoking status: Never    Smokeless tobacco: Never   Substance and Sexual Activity    Alcohol use: Not Currently     Comment: 1-2 a week    Drug use: Not Currently     Types: Marijuana    Sexual activity: Not Currently     Partners: Female           Review of Systems:    Constitution: Negative for chills, fever, and sweats.  Negative for unexplained weight loss.    HENT:  Negative for headaches and blurry vision.    Cardiovascular:Negative for chest pain or irregular heart beat. Negative for hypertension.    Respiratory:  Negative for cough and shortness of breath.    Gastrointestinal: Negative for abdominal pain, heartburn, melena, nausea, and vomitting.    Genitourinary:  Negative bladder incontinence and dysuria.    Musculoskeletal:  See HPI    Neurological: Negative for numbness.    Psychiatric/Behavioral: Negative for depression.  The patient is not nervous/anxious.      Endocrine: Negative for polyuria    Hematologic/Lymphatic: Negative for bleeding problem.  Does not bruise/bleed easily.    Skin: Negative for poor would healing and rash      Physical Examination:    Vital Signs:    Vitals:    01/25/24 0952   BP: 124/77   Pulse: (!) 58       Body mass index is 47.47 kg/m².    General: No acute distress, alert and oriented, healthy appearing    HEENT: Head is  atraumatic, mucous membranes are moist    Neck: Supples, no JVD    Cardiovascular: Palpable dorsalis pedis and posterior tibial pulses, regular rate and rhythm to those pulses    Lungs: Breathing non-labored    Skin: no rashes appreciated    Neurologic: Can flex and extend knees, ankles, and toes. Sensation is grossly intact    Right hip:  Gentle Range of motion of the right hip without significant or severe pain today.  Has a markedly positive Stinchfield.    X-rays:  Three views right reviewed.  Patient's implants have not changed in position significant.  No signs of periprosthetic fracture noted.     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  Patient given his history of infection, work him up for infection with blood work and possibly an aspiration.  We will also give him a pain medicine.  Largely sign of his implants having early failure given longevity as well as weight.  See him back in 1-2 weeks to go over the results of his blood work.    This note was created using [employer] voice recognition software that occasionally misinterpreted phrases or words.    Consult note is delivered via Epic messaging service.

## 2024-02-01 ENCOUNTER — OFFICE VISIT (OUTPATIENT)
Dept: ORTHOPEDICS | Facility: CLINIC | Age: 79
End: 2024-02-01
Payer: MEDICARE

## 2024-02-01 VITALS
WEIGHT: 315 LBS | TEMPERATURE: 97 F | SYSTOLIC BLOOD PRESSURE: 108 MMHG | HEART RATE: 91 BPM | HEIGHT: 72 IN | BODY MASS INDEX: 42.66 KG/M2 | DIASTOLIC BLOOD PRESSURE: 70 MMHG

## 2024-02-01 DIAGNOSIS — Z96.641 PRESENCE OF RIGHT ARTIFICIAL HIP JOINT: ICD-10-CM

## 2024-02-01 DIAGNOSIS — Z96.649 FAILED TOTAL HIP ARTHROPLASTY, INITIAL ENCOUNTER: Primary | ICD-10-CM

## 2024-02-01 DIAGNOSIS — T84.018A FAILED TOTAL HIP ARTHROPLASTY, INITIAL ENCOUNTER: Primary | ICD-10-CM

## 2024-02-01 DIAGNOSIS — Z01.818 PRE-OP TESTING: ICD-10-CM

## 2024-02-01 PROCEDURE — 99213 OFFICE O/P EST LOW 20 MIN: CPT | Mod: ,,, | Performed by: ORTHOPAEDIC SURGERY

## 2024-02-01 RX ORDER — MUPIROCIN 20 MG/G
OINTMENT TOPICAL
Status: CANCELLED | OUTPATIENT
Start: 2024-02-01

## 2024-02-01 RX ORDER — SODIUM CHLORIDE 9 MG/ML
INJECTION, SOLUTION INTRAVENOUS CONTINUOUS
Status: CANCELLED | OUTPATIENT
Start: 2024-02-01

## 2024-02-01 NOTE — PROGRESS NOTES
Chief Complaint:   Chief Complaint   Patient presents with    Right Hip - Follow-up, Results, Pain     Here for lab results.  Pain is worst in the lateral side of the hip going done the leg. Using walker to ambulate.  Had numbness in the past. Having difficult lifting leg to walk or to get up from standing position.  Taking RX and OTC as needed.        History of present illness:  78-year-old male presents today follow-up of severe pain in the right hip.  Patient is here today to go over blood work.  Continues to have significant severe pain right hip.  He is really unable to ambulate due to his hip pain    Past Medical History:   Diagnosis Date    Arthritis     Atrial fibrillation     Bladder cancer     CKD (chronic kidney disease)     Diabetes mellitus, type 2     Gout, unspecified     Hyperlipidemia     Hypertension     Restless leg syndrome     Sleep apnea, unspecified        Past Surgical History:   Procedure Laterality Date    BLADDER SURGERY      CARDIAC CATHETERIZATION      CARDIOVERSION      CATARACT EXTRACTION BILATERAL W/ ANTERIOR VITRECTOMY      EYE SURGERY      JOINT REPLACEMENT Right     Total Hip    MULTIPLE TOOTH EXTRACTIONS      VEIN SURGERY         Current Outpatient Medications   Medication Sig    allopurinoL (ZYLOPRIM) 300 MG tablet Take 1 tablet by mouth twice daily    doxazosin (CARDURA) 4 MG tablet Take 1 tablet by mouth once daily    ELIQUIS 5 mg Tab Take 5 mg by mouth 2 (two) times daily.    finasteride (PROSCAR) 5 mg tablet Take 1 tablet by mouth once daily    glipiZIDE (GLUCOTROL) 10 MG TR24 Take 1 tablet (10 mg total) by mouth daily with breakfast.    HYDROcodone-acetaminophen (NORCO) 5-325 mg per tablet Take 1 tablet by mouth every 6 (six) hours as needed for Pain.    loratadine (CLARITIN) 10 mg tablet Take 10 mg by mouth.    pioglitazone (ACTOS) 30 MG tablet Take 1 tablet (30 mg total) by mouth once daily.    rosuvastatin (CRESTOR) 20 MG tablet Take 20 mg by mouth every evening.     semaglutide (OZEMPIC) 0.25 mg or 0.5 mg(2 mg/1.5 mL) pen injector Inject into the skin every 7 days.    triamterene-hydrochlorothiazide 37.5-25 mg (DYAZIDE) 37.5-25 mg per capsule Take 2 capsules by mouth once daily     No current facility-administered medications for this visit.       Review of patient's allergies indicates:   Allergen Reactions    Clindamycin Rash    Levofloxacin Hives       Family History   Problem Relation Age of Onset    Coronary artery disease Mother     Cancer Father     Heart disease Father     Thyroid disease Sister        Social History     Socioeconomic History    Marital status:    Tobacco Use    Smoking status: Never    Smokeless tobacco: Never   Substance and Sexual Activity    Alcohol use: Not Currently     Comment: 1-2 a week    Drug use: Not Currently     Types: Marijuana    Sexual activity: Not Currently     Partners: Female           Review of Systems:    Constitution: Negative for chills, fever, and sweats.  Negative for unexplained weight loss.    HENT:  Negative for headaches and blurry vision.    Cardiovascular:Negative for chest pain or irregular heart beat. Negative for hypertension.    Respiratory:  Negative for cough and shortness of breath.    Gastrointestinal: Negative for abdominal pain, heartburn, melena, nausea, and vomitting.    Genitourinary:  Negative bladder incontinence and dysuria.    Musculoskeletal:  See HPI    Neurological: Negative for numbness.    Psychiatric/Behavioral: Negative for depression.  The patient is not nervous/anxious.      Endocrine: Negative for polyuria    Hematologic/Lymphatic: Negative for bleeding problem.  Does not bruise/bleed easily.    Skin: Negative for poor would healing and rash      Physical Examination:    Vital Signs:    Vitals:    02/01/24 1036   BP: 108/70   Pulse: 91   Temp: 97 °F (36.1 °C)       Body mass index is 47.2 kg/m².    .  General: No acute distress, alert and oriented, healthy appearing    HEENT: Head is  atraumatic, mucous membranes are moist    Neck: Supples, no JVD    Cardiovascular: Palpable dorsalis pedis and posterior tibial pulses, regular rate and rhythm to those pulses    Lungs: Breathing non-labored    Skin: no rashes appreciated    Neurologic: Can flex and extend knees, ankles, and toes. Sensation is grossly intact    Range of motion with pain.  Markedly positive Stinchfield limited due to pain.    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  His ESR and CRP are significantly elevated.  We will plan to do an aspiration of his right hip and a couple of weeks.  We will also get a CT scan to evaluate the integrity of the component.    This note was created using [employer] voice recognition software that occasionally misinterpreted phrases or words.    Consult note is delivered via Epic messaging service.

## 2024-02-01 NOTE — H&P (VIEW-ONLY)
Chief Complaint:   Chief Complaint   Patient presents with    Right Hip - Follow-up, Results, Pain     Here for lab results.  Pain is worst in the lateral side of the hip going done the leg. Using walker to ambulate.  Had numbness in the past. Having difficult lifting leg to walk or to get up from standing position.  Taking RX and OTC as needed.        History of present illness:  78-year-old male presents today follow-up of severe pain in the right hip.  Patient is here today to go over blood work.  Continues to have significant severe pain right hip.  He is really unable to ambulate due to his hip pain    Past Medical History:   Diagnosis Date    Arthritis     Atrial fibrillation     Bladder cancer     CKD (chronic kidney disease)     Diabetes mellitus, type 2     Gout, unspecified     Hyperlipidemia     Hypertension     Restless leg syndrome     Sleep apnea, unspecified        Past Surgical History:   Procedure Laterality Date    BLADDER SURGERY      CARDIAC CATHETERIZATION      CARDIOVERSION      CATARACT EXTRACTION BILATERAL W/ ANTERIOR VITRECTOMY      EYE SURGERY      JOINT REPLACEMENT Right     Total Hip    MULTIPLE TOOTH EXTRACTIONS      VEIN SURGERY         Current Outpatient Medications   Medication Sig    allopurinoL (ZYLOPRIM) 300 MG tablet Take 1 tablet by mouth twice daily    doxazosin (CARDURA) 4 MG tablet Take 1 tablet by mouth once daily    ELIQUIS 5 mg Tab Take 5 mg by mouth 2 (two) times daily.    finasteride (PROSCAR) 5 mg tablet Take 1 tablet by mouth once daily    glipiZIDE (GLUCOTROL) 10 MG TR24 Take 1 tablet (10 mg total) by mouth daily with breakfast.    HYDROcodone-acetaminophen (NORCO) 5-325 mg per tablet Take 1 tablet by mouth every 6 (six) hours as needed for Pain.    loratadine (CLARITIN) 10 mg tablet Take 10 mg by mouth.    pioglitazone (ACTOS) 30 MG tablet Take 1 tablet (30 mg total) by mouth once daily.    rosuvastatin (CRESTOR) 20 MG tablet Take 20 mg by mouth every evening.     semaglutide (OZEMPIC) 0.25 mg or 0.5 mg(2 mg/1.5 mL) pen injector Inject into the skin every 7 days.    triamterene-hydrochlorothiazide 37.5-25 mg (DYAZIDE) 37.5-25 mg per capsule Take 2 capsules by mouth once daily     No current facility-administered medications for this visit.       Review of patient's allergies indicates:   Allergen Reactions    Clindamycin Rash    Levofloxacin Hives       Family History   Problem Relation Age of Onset    Coronary artery disease Mother     Cancer Father     Heart disease Father     Thyroid disease Sister        Social History     Socioeconomic History    Marital status:    Tobacco Use    Smoking status: Never    Smokeless tobacco: Never   Substance and Sexual Activity    Alcohol use: Not Currently     Comment: 1-2 a week    Drug use: Not Currently     Types: Marijuana    Sexual activity: Not Currently     Partners: Female           Review of Systems:    Constitution: Negative for chills, fever, and sweats.  Negative for unexplained weight loss.    HENT:  Negative for headaches and blurry vision.    Cardiovascular:Negative for chest pain or irregular heart beat. Negative for hypertension.    Respiratory:  Negative for cough and shortness of breath.    Gastrointestinal: Negative for abdominal pain, heartburn, melena, nausea, and vomitting.    Genitourinary:  Negative bladder incontinence and dysuria.    Musculoskeletal:  See HPI    Neurological: Negative for numbness.    Psychiatric/Behavioral: Negative for depression.  The patient is not nervous/anxious.      Endocrine: Negative for polyuria    Hematologic/Lymphatic: Negative for bleeding problem.  Does not bruise/bleed easily.    Skin: Negative for poor would healing and rash      Physical Examination:    Vital Signs:    Vitals:    02/01/24 1036   BP: 108/70   Pulse: 91   Temp: 97 °F (36.1 °C)       Body mass index is 47.2 kg/m².    .  General: No acute distress, alert and oriented, healthy appearing    HEENT: Head is  atraumatic, mucous membranes are moist    Neck: Supples, no JVD    Cardiovascular: Palpable dorsalis pedis and posterior tibial pulses, regular rate and rhythm to those pulses    Lungs: Breathing non-labored    Skin: no rashes appreciated    Neurologic: Can flex and extend knees, ankles, and toes. Sensation is grossly intact    Range of motion with pain.  Markedly positive Stinchfield limited due to pain.    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  His ESR and CRP are significantly elevated.  We will plan to do an aspiration of his right hip and a couple of weeks.  We will also get a CT scan to evaluate the integrity of the component.    This note was created using [employer] voice recognition software that occasionally misinterpreted phrases or words.    Consult note is delivered via Epic messaging service.

## 2024-02-08 ENCOUNTER — ANESTHESIA EVENT (OUTPATIENT)
Dept: SURGERY | Facility: HOSPITAL | Age: 79
End: 2024-02-08
Payer: MEDICARE

## 2024-02-14 RX ORDER — HYDROCODONE BITARTRATE AND ACETAMINOPHEN 5; 325 MG/1; MG/1
1 TABLET ORAL
Status: CANCELLED | OUTPATIENT
Start: 2024-02-14

## 2024-02-14 RX ORDER — MEPERIDINE HYDROCHLORIDE 25 MG/ML
6.25 INJECTION INTRAMUSCULAR; INTRAVENOUS; SUBCUTANEOUS ONCE AS NEEDED
Status: CANCELLED | OUTPATIENT
Start: 2024-02-14 | End: 2024-02-15

## 2024-02-14 RX ORDER — HYDROMORPHONE HYDROCHLORIDE 2 MG/ML
0.4 INJECTION, SOLUTION INTRAMUSCULAR; INTRAVENOUS; SUBCUTANEOUS EVERY 5 MIN PRN
Status: CANCELLED | OUTPATIENT
Start: 2024-02-14

## 2024-02-14 RX ORDER — PROCHLORPERAZINE EDISYLATE 5 MG/ML
5 INJECTION INTRAMUSCULAR; INTRAVENOUS EVERY 30 MIN PRN
Status: CANCELLED | OUTPATIENT
Start: 2024-02-14

## 2024-02-14 RX ORDER — ONDANSETRON HYDROCHLORIDE 2 MG/ML
4 INJECTION, SOLUTION INTRAVENOUS DAILY PRN
Status: CANCELLED | OUTPATIENT
Start: 2024-02-14

## 2024-02-14 NOTE — ANESTHESIA PREPROCEDURE EVALUATION
02/14/2024  Chris Mendoza II is a 78 y.o., male with ----------------------------  Arthritis  Atrial fibrillation  Bladder cancer  CKD (chronic kidney disease)  Diabetes mellitus, type 2  Gout, unspecified  Hyperlipidemia  Hypertension  Restless leg syndrome  Sleep apnea, unspecified  Morbid Obesity with BMI of 46    And ----------------------------  Bladder surgery  Cardiac catheterization  Cardioversion  Cataract extraction w/  intraocular lens implant  Joint replacement      Comment:  Total Hip  Multiple tooth extractions  Vein surgery    Presents for right hip aspiration under anesthesia  Weight estimated as patient cannot stand on scale - walker and  wheelchair bound since beginning of January      Pre-op Assessment    I have reviewed the NPO Status.      Review of Systems  Cardiovascular:     Hypertension                                  Hypertension     Atrial Fibrillation     Pulmonary:        Sleep Apnea     Obstructive Sleep Apnea (EVA).           Renal/:  Chronic Renal Disease        Kidney Function/Disease             Endocrine:  Diabetes    Diabetes                          Physical Exam  General: Well nourished, Cooperative, Alert and Oriented    Airway:  Mallampati: III   Mouth Opening: Normal  TM Distance: Normal  Tongue: Normal  Neck ROM: Normal ROM  Neck: Girth Increased  Thick neck full beard   Dental:  Intact    Chest/Lungs:  Clear to auscultation, Normal Respiratory Rate    Heart:  Rate: Normal  Rhythm: Regular Rhythm    Musculoskeletal:  Not ambulatory - could not stand on weighing scale       Anesthesia Plan  Type of Anesthesia, risks & benefits discussed:    Anesthesia Type: Gen Natural Airway  Intra-op Monitoring Plan: Standard ASA Monitors  Post Op Pain Control Plan: IV/PO Opioids PRN  Induction:  IV  Airway Plan: Direct  Informed Consent: Informed consent signed with the Patient  and all parties understand the risks and agree with anesthesia plan.  All questions answered. Patient consented to blood products? No  ASA Score: 3  Day of Surgery Review of History & Physical: H&P Update referred to the surgeon/provider.  Anesthesia Plan Notes: Nasal cannula vs facemask supplemental oxygenation   For patients with EVA/obesity, may consider SuperNoval Nasal CPAP      Ready For Surgery From Anesthesia Perspective.     .

## 2024-02-15 ENCOUNTER — HOSPITAL ENCOUNTER (OUTPATIENT)
Dept: RADIOLOGY | Facility: HOSPITAL | Age: 79
Discharge: HOME | End: 2024-02-15
Attending: ORTHOPAEDIC SURGERY
Payer: MEDICARE

## 2024-02-15 ENCOUNTER — HOSPITAL ENCOUNTER (OUTPATIENT)
Facility: HOSPITAL | Age: 79
Discharge: HOME | End: 2024-02-15
Attending: ORTHOPAEDIC SURGERY | Admitting: ORTHOPAEDIC SURGERY
Payer: MEDICARE

## 2024-02-15 ENCOUNTER — ANESTHESIA (OUTPATIENT)
Dept: SURGERY | Facility: HOSPITAL | Age: 79
End: 2024-02-15
Payer: MEDICARE

## 2024-02-15 DIAGNOSIS — Z96.641 PRESENCE OF RIGHT ARTIFICIAL HIP JOINT: ICD-10-CM

## 2024-02-15 DIAGNOSIS — Z96.649 FAILED TOTAL HIP ARTHROPLASTY, INITIAL ENCOUNTER: ICD-10-CM

## 2024-02-15 DIAGNOSIS — T84.018A FAILED TOTAL HIP ARTHROPLASTY, INITIAL ENCOUNTER: ICD-10-CM

## 2024-02-15 DIAGNOSIS — Z01.818 PRE-OP TESTING: ICD-10-CM

## 2024-02-15 LAB
%MONO NUCL BF (OHS): 6.8 %
%POLYMORP BF(OHS): 93.2 %
CLARITY BODY FLUID (OHS): NORMAL
COLOR BODY FLUID (OHS): NORMAL
POCT GLUCOSE: 127 MG/DL (ref 70–110)
RBC COUNT BODY FLUID (OHS): NORMAL /UL
WBC # FLD AUTO: NORMAL /UL

## 2024-02-15 PROCEDURE — 63600175 PHARM REV CODE 636 W HCPCS

## 2024-02-15 PROCEDURE — 87075 CULTR BACTERIA EXCEPT BLOOD: CPT | Performed by: ORTHOPAEDIC SURGERY

## 2024-02-15 PROCEDURE — 87070 CULTURE OTHR SPECIMN AEROBIC: CPT | Performed by: ORTHOPAEDIC SURGERY

## 2024-02-15 PROCEDURE — 37000009 HC ANESTHESIA EA ADD 15 MINS: Performed by: ORTHOPAEDIC SURGERY

## 2024-02-15 PROCEDURE — 71000015 HC POSTOP RECOV 1ST HR: Performed by: ORTHOPAEDIC SURGERY

## 2024-02-15 PROCEDURE — 82962 GLUCOSE BLOOD TEST: CPT | Performed by: ORTHOPAEDIC SURGERY

## 2024-02-15 PROCEDURE — D9220A PRA ANESTHESIA: Mod: ANES,,, | Performed by: ANESTHESIOLOGY

## 2024-02-15 PROCEDURE — 36000705 HC OR TIME LEV I EA ADD 15 MIN: Performed by: ORTHOPAEDIC SURGERY

## 2024-02-15 PROCEDURE — 71000016 HC POSTOP RECOV ADDL HR: Performed by: ORTHOPAEDIC SURGERY

## 2024-02-15 PROCEDURE — 87205 SMEAR GRAM STAIN: CPT | Performed by: ORTHOPAEDIC SURGERY

## 2024-02-15 PROCEDURE — 36000704 HC OR TIME LEV I 1ST 15 MIN: Performed by: ORTHOPAEDIC SURGERY

## 2024-02-15 PROCEDURE — 27095 INJECTION FOR HIP X-RAY: CPT | Mod: RT,,, | Performed by: ORTHOPAEDIC SURGERY

## 2024-02-15 PROCEDURE — 25000003 PHARM REV CODE 250

## 2024-02-15 PROCEDURE — 77002 NEEDLE LOCALIZATION BY XRAY: CPT | Mod: 26,,, | Performed by: ORTHOPAEDIC SURGERY

## 2024-02-15 PROCEDURE — 73700 CT LOWER EXTREMITY W/O DYE: CPT | Mod: TC,RT

## 2024-02-15 PROCEDURE — 89051 BODY FLUID CELL COUNT: CPT | Performed by: ORTHOPAEDIC SURGERY

## 2024-02-15 PROCEDURE — 37000008 HC ANESTHESIA 1ST 15 MINUTES: Performed by: ORTHOPAEDIC SURGERY

## 2024-02-15 PROCEDURE — 25000003 PHARM REV CODE 250: Performed by: ORTHOPAEDIC SURGERY

## 2024-02-15 PROCEDURE — D9220A PRA ANESTHESIA: Mod: CRNA,,,

## 2024-02-15 RX ORDER — SODIUM CHLORIDE 9 MG/ML
INJECTION, SOLUTION INTRAVENOUS CONTINUOUS
Status: DISCONTINUED | OUTPATIENT
Start: 2024-02-15 | End: 2024-02-15 | Stop reason: HOSPADM

## 2024-02-15 RX ORDER — MUPIROCIN 20 MG/G
OINTMENT TOPICAL
Status: DISCONTINUED | OUTPATIENT
Start: 2024-02-15 | End: 2024-02-15 | Stop reason: HOSPADM

## 2024-02-15 RX ORDER — LIDOCAINE HYDROCHLORIDE 10 MG/ML
INJECTION INFILTRATION; PERINEURAL
Status: DISCONTINUED
Start: 2024-02-15 | End: 2024-02-15 | Stop reason: HOSPADM

## 2024-02-15 RX ORDER — PROPOFOL 10 MG/ML
VIAL (ML) INTRAVENOUS
Status: DISCONTINUED | OUTPATIENT
Start: 2024-02-15 | End: 2024-02-15

## 2024-02-15 RX ORDER — HYDROCODONE BITARTRATE AND ACETAMINOPHEN 5; 325 MG/1; MG/1
1 TABLET ORAL EVERY 6 HOURS PRN
Qty: 28 TABLET | Refills: 0 | Status: ON HOLD | OUTPATIENT
Start: 2024-02-15 | End: 2024-04-11 | Stop reason: HOSPADM

## 2024-02-15 RX ORDER — PHENYLEPHRINE HCL IN 0.9% NACL 1 MG/10 ML
SYRINGE (ML) INTRAVENOUS
Status: DISCONTINUED | OUTPATIENT
Start: 2024-02-15 | End: 2024-02-15

## 2024-02-15 RX ORDER — SODIUM CHLORIDE, SODIUM GLUCONATE, SODIUM ACETATE, POTASSIUM CHLORIDE AND MAGNESIUM CHLORIDE 30; 37; 368; 526; 502 MG/100ML; MG/100ML; MG/100ML; MG/100ML; MG/100ML
INJECTION, SOLUTION INTRAVENOUS CONTINUOUS
Status: ACTIVE | OUTPATIENT
Start: 2024-02-15 | End: 2024-03-16

## 2024-02-15 RX ORDER — LIDOCAINE HYDROCHLORIDE 20 MG/ML
INJECTION INTRAVENOUS
Status: DISCONTINUED | OUTPATIENT
Start: 2024-02-15 | End: 2024-02-15

## 2024-02-15 RX ORDER — SODIUM CHLORIDE, SODIUM LACTATE, POTASSIUM CHLORIDE, CALCIUM CHLORIDE 600; 310; 30; 20 MG/100ML; MG/100ML; MG/100ML; MG/100ML
INJECTION, SOLUTION INTRAVENOUS CONTINUOUS
Status: ACTIVE | OUTPATIENT
Start: 2024-02-15

## 2024-02-15 RX ORDER — LIDOCAINE HYDROCHLORIDE 10 MG/ML
INJECTION INFILTRATION; PERINEURAL
Status: DISCONTINUED | OUTPATIENT
Start: 2024-02-15 | End: 2024-02-15 | Stop reason: HOSPADM

## 2024-02-15 RX ADMIN — Medication 50 MCG: at 07:02

## 2024-02-15 RX ADMIN — SODIUM CHLORIDE, SODIUM GLUCONATE, SODIUM ACETATE, POTASSIUM CHLORIDE AND MAGNESIUM CHLORIDE: 526; 502; 368; 37; 30 INJECTION, SOLUTION INTRAVENOUS at 06:02

## 2024-02-15 RX ADMIN — PROPOFOL 70 MG: 10 INJECTION, EMULSION INTRAVENOUS at 06:02

## 2024-02-15 RX ADMIN — LIDOCAINE HYDROCHLORIDE 50 MG: 20 INJECTION INTRAVENOUS at 06:02

## 2024-02-15 NOTE — DISCHARGE SUMMARY
[facility] - Periop Services  Discharge Note  Short Stay    Procedure(s) (LRB):  ARTHROGRAM  aspiration right hip with anesthesia #1 (Right)      OUTCOME: Patient tolerated treatment/procedure well without complication and is now ready for discharge.    DISPOSITION: Home or Self Care    FINAL DIAGNOSIS:  Failed total hip arthroplasty, initial encounter    FOLLOWUP: In clinic    DISCHARGE INSTRUCTIONS:  No discharge procedures on file.     TIME SPENT ON DISCHARGE: 5 minutes

## 2024-02-15 NOTE — TRANSFER OF CARE
"Anesthesia Transfer of Care Note    Patient: Chris BONILLA Reji II    Procedure(s) Performed: Procedure(s) (LRB):  ARTHROGRAM  aspiration right hip with anesthesia #1 (Right)    Patient location: [facility]    Anesthesia Type: MAC    Transport from OR: Transported from OR on room air with adequate spontaneous ventilation    Post pain: adequate analgesia    Post assessment: no apparent anesthetic complications and tolerated procedure well    Post vital signs: stable    Level of consciousness: awake, alert and oriented    Nausea/Vomiting: no nausea/vomiting    Complications: none    Transfer of care protocol was followed    Last vitals: Visit Vitals  /83   Pulse 106   Temp 35.8 °C (96.5 °F) (Tympanic)   Resp 20   Ht 6' 0.01" (1.829 m)   Wt (!) 154.2 kg (340 lb)   SpO2 96%   BMI 46.10 kg/m²     "

## 2024-02-15 NOTE — ANESTHESIA POSTPROCEDURE EVALUATION
Anesthesia Post Evaluation    Patient: Chris BONILLA [place] II    Procedure(s) Performed: Procedure(s) (LRB):  ARTHROGRAM  aspiration right hip with anesthesia #1 (Right)    Final Anesthesia Type: general      Patient location during evaluation: PACU  Patient participation: Yes- Able to Participate  Level of consciousness: awake and alert  Post-procedure vital signs: reviewed and stable  Pain management: adequate  Airway patency: patent    PONV status at discharge: No PONV  Anesthetic complications: no      Cardiovascular status: hemodynamically stable  Respiratory status: unassisted  Hydration status: euvolemic  Follow-up not needed.              Vitals Value Taken Time   /66 02/15/24 0801   Temp  02/15/24 0812   Pulse 99 02/15/24 0812   Resp 14 02/15/24 0812   SpO2 97 % 02/15/24 0812   Vitals shown include unvalidated device data.      No case tracking events are documented in the log.      Pain/Shannon Score: No data recorded

## 2024-02-15 NOTE — OP NOTE
Date of Procedure: 2/15/2024    Procedure: R hip joint aspiration/arthrogram    Provider: Jose Luna MD    Pre-Operative Diagnosis: painful total hip    Post-Operative Diagnosis: as above    Anesthesia: General/MAC    Description of the Findings of the Procedure:     The patient was brought to the procedure room.  IV access was obtained prior to the procedure.  The patient was positioned on the fluoroscopy table.  Sedation was administered by anesthesia.  The skin overlying the hip was prepped and draped in a sterile fashion.  The skin and subcutaneous tissue was anesthetized using 1-2 cc of lidocaine 2%.  An 18 gauge, spinal needle was slowly advanced through under fluoroscopic guidance into the acetabulofemoral joint. The needle position was confirmed using oblique, AP and lateral fluoroscopic imaging.  2 cc of Omnipaque 300 was injected confirming intra-articular contrast spread. Aspiration was performed at this point and 5 cc of serosanguineous colored fluid was able to be aspirated.  Fluid will be sent for cell count and cultures. The needle was removed and band-aid placed.  A sterile dressing was applied. Perez was taken to the Post-block Recovery Area for further observation. And discharged home when appropriate.    Complications: No    Estimated Blood Loss (EBL): Minimal           Specimens: none           Condition: Good    Disposition: PACU - hemodynamically stable.      Fluoroscopic guidance was used for needle localization.  Images were saved and stored for documentation.  The hip structures were identified and visualized.  Dynamic visualization of the 18g x 3.5 cm needle was continuous throughout the procedure and maintained in good position.

## 2024-02-15 NOTE — DISCHARGE INSTRUCTIONS
JOINT INJECTION, CARE AFTER    Refer to this sheet in the next few days. These instructions provide you with information on caring for yourself after you have had a joint injection. Your caregiver also may give you more specific instructions. Call your caregiver if you have any problems or questions after your procedure.     After any type of joint injection it is common to experience:  Soreness, swelling, or bruising around the injection site.  Mild numbness, tingling or weakness around the injection site caused by the numbing medicine used before or with the injection.       It is possible to experience the following effects associated with the specific agent after injection:      Iodine-based contrast agents:   Allergic reaction (itching, hives, widespread redness and swelling beyond the injection site)    Corticosteroids:  Allergic reaction  Increased blood sugar levels  Increased blood pressure  Mood swings  Temporary flushing or redness  Inability to sleep    HOME CARE INSTRUCTIONS  Limit yourself to light activity the day of your procedure. Avoid lifting heavy objects, bending, stooping or twisting  Take prescription or over-the-counter pain medication as directed  You my apply ice to your injection site to reduce pain and swelling the day of your procedure. Ice may be applied for 20 minutes 3-4x/day. Put ice in plastic bag. Place a towel between your skin and the ice.     NO DRIVING FOR 24 HOURS  KEEP BAND-AID CLEAN AND DRY FOR 24 HOURS. OK TO SHOWER TOMORROW. NO TUB BATHS FOR SEVERAL DAYS. CALL DOCTORS OFFICE FOR ANY REDNESS, FEVER OR PUS DRAINAGE FROM INJECTION SITE

## 2024-02-15 NOTE — PLAN OF CARE
Preparing for discharge. Vss.inst reviewed. Ice pack to right hip. Has CT scheduled after discharge. Radiology notified pt @ outpt sx, will proceed once discharge complete

## 2024-02-15 NOTE — INTERVAL H&P NOTE
I have seen the patient and reviewed this note, and there is no change in history and physical since the last exam. 
I have seen the patient and reviewed this note, and there is no change in history and physical since the last exam. 
Detail Level: Generalized
X Size Of Lesion In Cm (Optional): 0

## 2024-02-16 VITALS
OXYGEN SATURATION: 97 % | SYSTOLIC BLOOD PRESSURE: 101 MMHG | BODY MASS INDEX: 42.66 KG/M2 | RESPIRATION RATE: 20 BRPM | TEMPERATURE: 97 F | DIASTOLIC BLOOD PRESSURE: 70 MMHG | HEIGHT: 72 IN | WEIGHT: 315 LBS | HEART RATE: 99 BPM

## 2024-02-16 LAB
GRAM STN SPEC: NORMAL
GRAM STN SPEC: NORMAL

## 2024-02-18 LAB
BACTERIA FLD CULT: ABNORMAL
BACTERIA SPEC ANAEROBE CULT: NORMAL

## 2024-02-27 ENCOUNTER — OFFICE VISIT (OUTPATIENT)
Dept: ORTHOPEDICS | Facility: CLINIC | Age: 79
End: 2024-02-27
Payer: MEDICARE

## 2024-02-27 VITALS
SYSTOLIC BLOOD PRESSURE: 113 MMHG | DIASTOLIC BLOOD PRESSURE: 72 MMHG | HEIGHT: 72 IN | BODY MASS INDEX: 42.66 KG/M2 | WEIGHT: 315 LBS | HEART RATE: 114 BPM

## 2024-02-27 DIAGNOSIS — Z96.649 FAILED TOTAL HIP ARTHROPLASTY, INITIAL ENCOUNTER: Primary | ICD-10-CM

## 2024-02-27 DIAGNOSIS — T84.018A FAILED TOTAL HIP ARTHROPLASTY, INITIAL ENCOUNTER: Primary | ICD-10-CM

## 2024-02-27 PROCEDURE — 99214 OFFICE O/P EST MOD 30 MIN: CPT | Mod: ,,, | Performed by: ORTHOPAEDIC SURGERY

## 2024-02-27 RX ORDER — DOXYCYCLINE HYCLATE 100 MG
100 TABLET ORAL 2 TIMES DAILY
Qty: 60 TABLET | Refills: 2 | Status: ON HOLD | OUTPATIENT
Start: 2024-02-27 | End: 2024-04-12 | Stop reason: HOSPADM

## 2024-02-27 RX ORDER — HYDROCODONE BITARTRATE AND ACETAMINOPHEN 5; 325 MG/1; MG/1
1 TABLET ORAL EVERY 6 HOURS PRN
Qty: 28 TABLET | Refills: 0 | Status: ON HOLD | OUTPATIENT
Start: 2024-02-27 | End: 2024-04-11 | Stop reason: HOSPADM

## 2024-02-27 NOTE — PROGRESS NOTES
Chief Complaint:   Chief Complaint   Patient presents with    Right Hip - Follow-up     12 dya f/u from right hip aspiration. Reports difficulty with ambulation. Increase pain.        History of present illness:  71-year-old male presents today for evaluation right hip pain.  It was here today follow-up of aspiration results.  Continues to have significant severe pain.  Activity.  Improved by rest.  Really unable to perform any activities living hip pain    Past Medical History:   Diagnosis Date    Arthritis     Atrial fibrillation     Bladder cancer     CKD (chronic kidney disease)     Diabetes mellitus, type 2     Gout, unspecified     Hyperlipidemia     Hypertension     Restless leg syndrome     Sleep apnea, unspecified        Past Surgical History:   Procedure Laterality Date    ARTHROGRAM Right 2/15/2024    Procedure: ARTHROGRAM  aspiration right hip with anesthesia #1;  Surgeon: Jose Luna MD;  Location: [facility];  Service: Orthopedics;  Laterality: Right;  aspiration right hip with anesthesia    BLADDER SURGERY      CARDIAC CATHETERIZATION      CARDIOVERSION      CATARACT EXTRACTION W/  INTRAOCULAR LENS IMPLANT Bilateral     JOINT REPLACEMENT Right     Total Hip    MULTIPLE TOOTH EXTRACTIONS      VEIN SURGERY         Current Outpatient Medications   Medication Sig    ELIQUIS 5 mg Tab Take 5 mg by mouth 2 (two) times daily.    HYDROcodone-acetaminophen (NORCO) 5-325 mg per tablet Take 1 tablet by mouth every 6 (six) hours as needed for Pain.    rosuvastatin (CRESTOR) 20 MG tablet Take 20 mg by mouth every evening.    semaglutide (OZEMPIC) 0.25 mg or 0.5 mg(2 mg/1.5 mL) pen injector Inject into the skin every 7 days.    allopurinoL (ZYLOPRIM) 300 MG tablet Take 1 tablet by mouth twice daily (Patient taking differently: Take 300 mg by mouth Daily.)    doxazosin (CARDURA) 4 MG tablet Take 1 tablet by mouth once daily (Patient taking differently: Take 4 mg by mouth once daily.)    finasteride (PROSCAR) 5  mg tablet Take 1 tablet by mouth once daily (Patient taking differently: Take 5 mg by mouth once daily.)    glipiZIDE (GLUCOTROL) 10 MG TR24 Take 1 tablet (10 mg total) by mouth daily with breakfast.    HYDROcodone-acetaminophen (NORCO) 5-325 mg per tablet Take 1 tablet by mouth every 6 (six) hours as needed for Pain. (Patient not taking: Reported on 2/27/2024)    loratadine (CLARITIN) 10 mg tablet Take 10 mg by mouth daily as needed.    pioglitazone (ACTOS) 30 MG tablet Take 1 tablet (30 mg total) by mouth once daily.     No current facility-administered medications for this visit.     Facility-Administered Medications Ordered in Other Visits   Medication    electrolyte-A infusion    lactated ringers infusion       Review of patient's allergies indicates:   Allergen Reactions    Clindamycin Rash    Levofloxacin Hives       Family History   Problem Relation Age of Onset    Coronary artery disease Mother     Cancer Father     Heart disease Father     Thyroid disease Sister        Social History     Socioeconomic History    Marital status:    Tobacco Use    Smoking status: Never    Smokeless tobacco: Never   Substance and Sexual Activity    Alcohol use: Yes     Alcohol/week: 1.0 standard drink of alcohol     Types: 1 Shots of liquor per week     Comment: 1-2 a week    Drug use: Not Currently     Types: Marijuana    Sexual activity: Yes     Partners: Female           Review of Systems:    Constitution: Negative for chills, fever, and sweats.  Negative for unexplained weight loss.    HENT:  Negative for headaches and blurry vision.    Cardiovascular:Negative for chest pain or irregular heart beat. Negative for hypertension.    Respiratory:  Negative for cough and shortness of breath.    Gastrointestinal: Negative for abdominal pain, heartburn, melena, nausea, and vomitting.    Genitourinary:  Negative bladder incontinence and dysuria.    Musculoskeletal:  See HPI    Neurological: Negative for  numbness.    Psychiatric/Behavioral: Negative for depression.  The patient is not nervous/anxious.      Endocrine: Negative for polyuria    Hematologic/Lymphatic: Negative for bleeding problem.  Does not bruise/bleed easily.    Skin: Negative for poor would healing and rash      Physical Examination:    Vital Signs:    Vitals:    02/27/24 1311   BP: 113/72   Pulse: (!) 114       Body mass index is 46.11 kg/m².    General: No acute distress, alert and oriented, healthy appearing    HEENT: Head is atraumatic, mucous membranes are moist    Neck: Supples, no JVD    Cardiovascular: Palpable dorsalis pedis and posterior tibial pulses, regular rate and rhythm to those pulses    Lungs: Breathing non-labored    Skin: no rashes appreciated    Neurologic: Can flex and extend knees, ankles, and toes. Sensation is grossly intact    Right hip:  Range of motion of the right hip with significant pain and discomfort.  Internal and external rotation cause him groin pain.    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  Also showed significant positive cultures for MRSA.  Discussed all treatment options the patient.  Patient was to try suppression with regards to his right hip.  I discussed that given the fact that I think this is likely going to fail given his infection with MRSA, we will plan to go ahead and schedule him for revision of the right hip as well we will place him an antibiotic spacer.  We will see him back in a few weeks to check his progress after placement on antibiotics.    This note was created using [employer] voice recognition software that occasionally misinterpreted phrases or words.    Consult note is delivered via Epic messaging service.

## 2024-03-11 ENCOUNTER — TELEPHONE (OUTPATIENT)
Dept: FAMILY MEDICINE | Facility: CLINIC | Age: 79
End: 2024-03-11
Payer: MEDICARE

## 2024-03-11 DIAGNOSIS — E78.5 HYPERLIPIDEMIA, UNSPECIFIED HYPERLIPIDEMIA TYPE: Primary | ICD-10-CM

## 2024-03-11 DIAGNOSIS — E11.22 TYPE 2 DIABETES MELLITUS WITH STAGE 3A CHRONIC KIDNEY DISEASE, WITHOUT LONG-TERM CURRENT USE OF INSULIN: ICD-10-CM

## 2024-03-11 DIAGNOSIS — I10 PRIMARY HYPERTENSION: ICD-10-CM

## 2024-03-11 DIAGNOSIS — N18.31 TYPE 2 DIABETES MELLITUS WITH STAGE 3A CHRONIC KIDNEY DISEASE, WITHOUT LONG-TERM CURRENT USE OF INSULIN: ICD-10-CM

## 2024-03-11 NOTE — TELEPHONE ENCOUNTER
----- Message from Adolfo Contreras sent at 3/11/2024  1:07 PM CDT -----  .Type:  Needs Medical Advice    Who Called: Chris  Symptoms (please be specific):    How long has patient had these symptoms:    Pharmacy name and phone #:    Would the patient rather a call back or a response via [facility]?   Best Call Back Number: [phone]  Additional Information: Patient requested to speak with Ms. Kinjal directly has some questions  for her. Thanks.

## 2024-03-11 NOTE — TELEPHONE ENCOUNTER
Spoke with patient for his wellness previsit.  Patient stated that he could not come in for his wellness visit at this time due to his hip hurting too bad and it being hard to get around.  He stated that he is having surgery on 4/8/24 and will want to wait until after his procedure.  Provider stated that he will be getting his clearance with Dr. Malloy so it was okay to reschedule 6 to 8 weeks after his procedure.  Appt rescheduled for 5/30/24.

## 2024-03-11 NOTE — TELEPHONE ENCOUNTER
Patient called to see if the Eliquis application was completed.  He was notified that we did not receive the paperwork.  He will ask them to send a new application.

## 2024-03-26 ENCOUNTER — HOSPITAL ENCOUNTER (OUTPATIENT)
Dept: RADIOLOGY | Facility: HOSPITAL | Age: 79
Discharge: HOME | End: 2024-03-26
Attending: NURSE PRACTITIONER
Payer: MEDICARE

## 2024-03-26 ENCOUNTER — OFFICE VISIT (OUTPATIENT)
Dept: ORTHOPEDICS | Facility: CLINIC | Age: 79
End: 2024-03-26
Payer: MEDICARE

## 2024-03-26 VITALS
SYSTOLIC BLOOD PRESSURE: 106 MMHG | DIASTOLIC BLOOD PRESSURE: 68 MMHG | WEIGHT: 315 LBS | BODY MASS INDEX: 41.75 KG/M2 | HEART RATE: 98 BPM | HEIGHT: 73 IN

## 2024-03-26 DIAGNOSIS — N18.31 TYPE 2 DIABETES MELLITUS WITH STAGE 3A CHRONIC KIDNEY DISEASE, WITHOUT LONG-TERM CURRENT USE OF INSULIN: ICD-10-CM

## 2024-03-26 DIAGNOSIS — G89.29 CHRONIC PAIN OF RIGHT HIP: ICD-10-CM

## 2024-03-26 DIAGNOSIS — Z96.649 FAILED TOTAL HIP ARTHROPLASTY, INITIAL ENCOUNTER: Primary | ICD-10-CM

## 2024-03-26 DIAGNOSIS — Z01.818 PRE-OP TESTING: ICD-10-CM

## 2024-03-26 DIAGNOSIS — M25.551 CHRONIC PAIN OF RIGHT HIP: ICD-10-CM

## 2024-03-26 DIAGNOSIS — T84.019A FAILED TOTAL JOINT REPLACEMENT: ICD-10-CM

## 2024-03-26 DIAGNOSIS — E11.22 TYPE 2 DIABETES MELLITUS WITH STAGE 3A CHRONIC KIDNEY DISEASE, WITHOUT LONG-TERM CURRENT USE OF INSULIN: ICD-10-CM

## 2024-03-26 DIAGNOSIS — T84.018A FAILED TOTAL HIP ARTHROPLASTY, INITIAL ENCOUNTER: Primary | ICD-10-CM

## 2024-03-26 DIAGNOSIS — T84.018A FAILED TOTAL HIP ARTHROPLASTY, INITIAL ENCOUNTER: ICD-10-CM

## 2024-03-26 DIAGNOSIS — Z96.649 FAILED TOTAL HIP ARTHROPLASTY, INITIAL ENCOUNTER: ICD-10-CM

## 2024-03-26 LAB — MRSA PCR SCRN (OHS): NOT DETECTED

## 2024-03-26 PROCEDURE — 71046 X-RAY EXAM CHEST 2 VIEWS: CPT | Mod: TC

## 2024-03-26 PROCEDURE — 99024 POSTOP FOLLOW-UP VISIT: CPT | Mod: POP,,, | Performed by: NURSE PRACTITIONER

## 2024-03-26 RX ORDER — ACETAMINOPHEN 500 MG
1000 TABLET ORAL
Status: CANCELLED | OUTPATIENT
Start: 2024-03-26

## 2024-03-26 RX ORDER — KETOROLAC TROMETHAMINE 10 MG/1
10 TABLET, FILM COATED ORAL
Status: CANCELLED | OUTPATIENT
Start: 2024-03-26 | End: 2024-03-26

## 2024-03-26 RX ORDER — GABAPENTIN 100 MG/1
300 CAPSULE ORAL
Status: CANCELLED | OUTPATIENT
Start: 2024-03-26

## 2024-03-26 RX ORDER — ONDANSETRON 4 MG/1
4 TABLET, ORALLY DISINTEGRATING ORAL
Status: CANCELLED | OUTPATIENT
Start: 2024-03-26

## 2024-03-26 RX ORDER — DOXYCYCLINE 100 MG/1
100 CAPSULE ORAL EVERY 12 HOURS
Qty: 60 CAPSULE | Refills: 0 | Status: ON HOLD | OUTPATIENT
Start: 2024-03-26 | End: 2024-04-12 | Stop reason: HOSPADM

## 2024-03-26 RX ORDER — SODIUM CHLORIDE, SODIUM GLUCONATE, SODIUM ACETATE, POTASSIUM CHLORIDE AND MAGNESIUM CHLORIDE 30; 37; 368; 526; 502 MG/100ML; MG/100ML; MG/100ML; MG/100ML; MG/100ML
INJECTION, SOLUTION INTRAVENOUS CONTINUOUS
Status: CANCELLED | OUTPATIENT
Start: 2024-03-26

## 2024-03-26 RX ORDER — TRANEXAMIC ACID 650 MG/1
1950 TABLET ORAL
Status: CANCELLED | OUTPATIENT
Start: 2024-03-26 | End: 2024-03-26

## 2024-03-26 RX ORDER — SCOLOPAMINE TRANSDERMAL SYSTEM 1 MG/1
1 PATCH, EXTENDED RELEASE TRANSDERMAL ONCE AS NEEDED
Status: CANCELLED | OUTPATIENT
Start: 2024-03-26 | End: 2035-08-23

## 2024-03-26 NOTE — H&P (VIEW-ONLY)
Chief Complaint:   Chief Complaint   Patient presents with    Right Hip - Pre-op Exam     pre op revision- Right COLLIN sx 4/8/24, confirmed its the right hip and wants the sx       History of present illness:  71-year-old male presents today for evaluation right hip pain. Continues to have significant severe pain.  Very low activity.  Pain somewhat improved by rest.  Really unable to perform any activities.  In a wheelchair today here in clinic.  Does feel as though he has reached a point of disability and would like to proceed with a revision right hip.  Currently on doxycycline in his to continue this.    Past Medical History:   Diagnosis Date    Arthritis     Atrial fibrillation     Bladder cancer     CKD (chronic kidney disease)     Diabetes mellitus, type 2     Gout, unspecified     Hyperlipidemia     Hypertension     Restless leg syndrome     Seasonal allergies     Sleep apnea, unspecified     Thyroid disease        Past Surgical History:   Procedure Laterality Date    ARTHROGRAM Right 2/15/2024    Procedure: ARTHROGRAM  aspiration right hip with anesthesia #1;  Surgeon: Jose Luna MD;  Location: [facility];  Service: Orthopedics;  Laterality: Right;  aspiration right hip with anesthesia    BLADDER SURGERY      CARDIAC CATHETERIZATION      CARDIOVERSION      CATARACT EXTRACTION W/  INTRAOCULAR LENS IMPLANT Bilateral     JOINT REPLACEMENT Right     Total Hip    MULTIPLE TOOTH EXTRACTIONS      VEIN SURGERY         Current Outpatient Medications   Medication Sig    allopurinoL (ZYLOPRIM) 300 MG tablet Take 1 tablet by mouth twice daily (Patient taking differently: Take 150 mg by mouth Daily.)    doxazosin (CARDURA) 4 MG tablet Take 1 tablet by mouth once daily (Patient taking differently: Take 4 mg by mouth once daily.)    doxycycline (VIBRA-TABS) 100 MG tablet Take 1 tablet (100 mg total) by mouth 2 (two) times daily.    ELIQUIS 5 mg Tab Take 5 mg by mouth 2 (two) times daily.    finasteride (PROSCAR) 5 mg  tablet Take 1 tablet by mouth once daily (Patient taking differently: Take 5 mg by mouth once daily.)    glipiZIDE (GLUCOTROL) 10 MG TR24 Take 1 tablet (10 mg total) by mouth daily with breakfast.    HYDROcodone-acetaminophen (NORCO) 5-325 mg per tablet Take 1 tablet by mouth every 6 (six) hours as needed for Pain.    loratadine (CLARITIN) 10 mg tablet Take 10 mg by mouth daily as needed.    pioglitazone (ACTOS) 30 MG tablet Take 1 tablet (30 mg total) by mouth once daily.    rosuvastatin (CRESTOR) 20 MG tablet Take 20 mg by mouth every evening.    semaglutide (OZEMPIC) 0.25 mg or 0.5 mg(2 mg/1.5 mL) pen injector Inject into the skin every 7 days.    HYDROcodone-acetaminophen (NORCO) 5-325 mg per tablet Take 1 tablet by mouth every 6 (six) hours as needed for Pain. (Patient not taking: Reported on 3/26/2024)    HYDROcodone-acetaminophen (NORCO) 5-325 mg per tablet Take 1 tablet by mouth every 6 (six) hours as needed for Pain. (Patient not taking: Reported on 3/26/2024)     No current facility-administered medications for this visit.     Facility-Administered Medications Ordered in Other Visits   Medication    lactated ringers infusion       Review of patient's allergies indicates:   Allergen Reactions    Clindamycin Rash    Levofloxacin Hives       Family History   Problem Relation Age of Onset    Coronary artery disease Mother     Cancer Father     Heart disease Father     Thyroid disease Sister        Social History     Socioeconomic History    Marital status:    Tobacco Use    Smoking status: Never    Smokeless tobacco: Never   Substance and Sexual Activity    Alcohol use: Not Currently    Drug use: Not Currently     Types: Marijuana    Sexual activity: Yes     Partners: Female           Review of Systems:    Constitution: Negative for chills, fever, and sweats.  Negative for unexplained weight loss.    HENT:  Negative for headaches and blurry vision.    Cardiovascular:Negative for chest pain or irregular  heart beat. Negative for hypertension.    Respiratory:  Negative for cough and shortness of breath.    Gastrointestinal: Negative for abdominal pain, heartburn, melena, nausea, and vomitting.    Genitourinary:  Negative bladder incontinence and dysuria.    Musculoskeletal:  See HPI    Neurological: Negative for numbness.    Psychiatric/Behavioral: Negative for depression.  The patient is not nervous/anxious.      Endocrine: Negative for polyuria    Hematologic/Lymphatic: Negative for bleeding problem.  Does not bruise/bleed easily.    Skin: Negative for poor would healing and rash      Physical Examination:    Vital Signs:    Vitals:    03/26/24 0927   BP: 106/68   Pulse: 98       Body mass index is 45.78 kg/m².    General: No acute distress, alert and oriented, healthy appearing    HEENT: Head is atraumatic, mucous membranes are moist    Neck: Supples, no JVD    Cardiovascular: Palpable dorsalis pedis and posterior tibial pulses, regular rate and rhythm to those pulses    Lungs: Breathing non-labored    Skin: no rashes appreciated    Neurologic: Can flex and extend knees, ankles, and toes. Sensation is grossly intact    Right hip:  Range of motion of the right hip with significant pain and discomfort.  Internal and external rotation cause him groin pain.    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 They have tried and failed nonoperative management including: Anti-inflammatories, activity modification, physical therapy. All of these have failed to completely remove their pain. They have pain going up and down stairs as well as walking on level ground. The hip pain is affecting activities of daily living They feel that they've reached a point of disability with regards to their hip.  The patient would like to proceed with surgical  intervention and would be a good candidate for a revision of total hip replacement with antibiotic spacer. From a surgical standpoint he would require 2 stage revision with placement of antibiotic spacer, 6 to 7 weeks of IV antibiotics.     Revision of total hip arthroplasty procedure, alternatives, risks, and benefits were discussed in detail. The risks including but not limited to: infection, need for revision surgery, pain, swelling, loosening, injury to surrounding neurovascular structures, stiffness, incomplete resolution of pain, DVT, PE, and death were discussed in detail. Despite these risks, the patient would like to proceed with surgical intervention. All questions were answered, no guarantees made. Will plan for revision of right COLLIN on right.      This note was created using [employer] voice recognition software that occasionally misinterpreted phrases or words.    Consult note is delivered via Epic messaging service.

## 2024-03-26 NOTE — PROGRESS NOTES
Chief Complaint:   Chief Complaint   Patient presents with    Right Hip - Pre-op Exam     pre op revision- Right COLLIN sx 4/8/24, confirmed its the right hip and wants the sx       History of present illness:  71-year-old male presents today for evaluation right hip pain. Continues to have significant severe pain.  Very low activity.  Pain somewhat improved by rest.  Really unable to perform any activities.  In a wheelchair today here in clinic.  Does feel as though he has reached a point of disability and would like to proceed with a revision right hip.  Currently on doxycycline in his to continue this.    Past Medical History:   Diagnosis Date    Arthritis     Atrial fibrillation     Bladder cancer     CKD (chronic kidney disease)     Diabetes mellitus, type 2     Gout, unspecified     Hyperlipidemia     Hypertension     Restless leg syndrome     Seasonal allergies     Sleep apnea, unspecified     Thyroid disease        Past Surgical History:   Procedure Laterality Date    ARTHROGRAM Right 2/15/2024    Procedure: ARTHROGRAM  aspiration right hip with anesthesia #1;  Surgeon: Jose Luna MD;  Location: [facility];  Service: Orthopedics;  Laterality: Right;  aspiration right hip with anesthesia    BLADDER SURGERY      CARDIAC CATHETERIZATION      CARDIOVERSION      CATARACT EXTRACTION W/  INTRAOCULAR LENS IMPLANT Bilateral     JOINT REPLACEMENT Right     Total Hip    MULTIPLE TOOTH EXTRACTIONS      VEIN SURGERY         Current Outpatient Medications   Medication Sig    allopurinoL (ZYLOPRIM) 300 MG tablet Take 1 tablet by mouth twice daily (Patient taking differently: Take 150 mg by mouth Daily.)    doxazosin (CARDURA) 4 MG tablet Take 1 tablet by mouth once daily (Patient taking differently: Take 4 mg by mouth once daily.)    doxycycline (VIBRA-TABS) 100 MG tablet Take 1 tablet (100 mg total) by mouth 2 (two) times daily.    ELIQUIS 5 mg Tab Take 5 mg by mouth 2 (two) times daily.    finasteride (PROSCAR) 5 mg  tablet Take 1 tablet by mouth once daily (Patient taking differently: Take 5 mg by mouth once daily.)    glipiZIDE (GLUCOTROL) 10 MG TR24 Take 1 tablet (10 mg total) by mouth daily with breakfast.    HYDROcodone-acetaminophen (NORCO) 5-325 mg per tablet Take 1 tablet by mouth every 6 (six) hours as needed for Pain.    loratadine (CLARITIN) 10 mg tablet Take 10 mg by mouth daily as needed.    pioglitazone (ACTOS) 30 MG tablet Take 1 tablet (30 mg total) by mouth once daily.    rosuvastatin (CRESTOR) 20 MG tablet Take 20 mg by mouth every evening.    semaglutide (OZEMPIC) 0.25 mg or 0.5 mg(2 mg/1.5 mL) pen injector Inject into the skin every 7 days.    HYDROcodone-acetaminophen (NORCO) 5-325 mg per tablet Take 1 tablet by mouth every 6 (six) hours as needed for Pain. (Patient not taking: Reported on 3/26/2024)    HYDROcodone-acetaminophen (NORCO) 5-325 mg per tablet Take 1 tablet by mouth every 6 (six) hours as needed for Pain. (Patient not taking: Reported on 3/26/2024)     No current facility-administered medications for this visit.     Facility-Administered Medications Ordered in Other Visits   Medication    lactated ringers infusion       Review of patient's allergies indicates:   Allergen Reactions    Clindamycin Rash    Levofloxacin Hives       Family History   Problem Relation Age of Onset    Coronary artery disease Mother     Cancer Father     Heart disease Father     Thyroid disease Sister        Social History     Socioeconomic History    Marital status:    Tobacco Use    Smoking status: Never    Smokeless tobacco: Never   Substance and Sexual Activity    Alcohol use: Not Currently    Drug use: Not Currently     Types: Marijuana    Sexual activity: Yes     Partners: Female           Review of Systems:    Constitution: Negative for chills, fever, and sweats.  Negative for unexplained weight loss.    HENT:  Negative for headaches and blurry vision.    Cardiovascular:Negative for chest pain or irregular  heart beat. Negative for hypertension.    Respiratory:  Negative for cough and shortness of breath.    Gastrointestinal: Negative for abdominal pain, heartburn, melena, nausea, and vomitting.    Genitourinary:  Negative bladder incontinence and dysuria.    Musculoskeletal:  See HPI    Neurological: Negative for numbness.    Psychiatric/Behavioral: Negative for depression.  The patient is not nervous/anxious.      Endocrine: Negative for polyuria    Hematologic/Lymphatic: Negative for bleeding problem.  Does not bruise/bleed easily.    Skin: Negative for poor would healing and rash      Physical Examination:    Vital Signs:    Vitals:    03/26/24 0927   BP: 106/68   Pulse: 98       Body mass index is 45.78 kg/m².    General: No acute distress, alert and oriented, healthy appearing    HEENT: Head is atraumatic, mucous membranes are moist    Neck: Supples, no JVD    Cardiovascular: Palpable dorsalis pedis and posterior tibial pulses, regular rate and rhythm to those pulses    Lungs: Breathing non-labored    Skin: no rashes appreciated    Neurologic: Can flex and extend knees, ankles, and toes. Sensation is grossly intact    Right hip:  Range of motion of the right hip with significant pain and discomfort.  Internal and external rotation cause him groin pain.    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 They have tried and failed nonoperative management including: Anti-inflammatories, activity modification, physical therapy. All of these have failed to completely remove their pain. They have pain going up and down stairs as well as walking on level ground. The hip pain is affecting activities of daily living They feel that they've reached a point of disability with regards to their hip.  The patient would like to proceed with surgical  intervention and would be a good candidate for a revision of total hip replacement with antibiotic spacer. From a surgical standpoint he would require 2 stage revision with placement of antibiotic spacer, 6 to 7 weeks of IV antibiotics.     Revision of total hip arthroplasty procedure, alternatives, risks, and benefits were discussed in detail. The risks including but not limited to: infection, need for revision surgery, pain, swelling, loosening, injury to surrounding neurovascular structures, stiffness, incomplete resolution of pain, DVT, PE, and death were discussed in detail. Despite these risks, the patient would like to proceed with surgical intervention. All questions were answered, no guarantees made. Will plan for revision of right COLLIN on right.      This note was created using [employer] voice recognition software that occasionally misinterpreted phrases or words.    Consult note is delivered via Epic messaging service.

## 2024-04-02 ENCOUNTER — ANESTHESIA EVENT (OUTPATIENT)
Dept: SURGERY | Facility: HOSPITAL | Age: 79
DRG: 467 | End: 2024-04-02
Payer: MEDICARE

## 2024-04-04 ENCOUNTER — TELEPHONE (OUTPATIENT)
Dept: PREADMISSION TESTING | Facility: HOSPITAL | Age: 79
End: 2024-04-04

## 2024-04-05 NOTE — PROGRESS NOTES
Joint revision Planned    This patient has an active or unstable comorbidity that significantly increases the mortality risk and require more than 24 hours of postoperative monitoring.    This comorbidity is: A-fib, on Eliquis, BMI greater than or equal to 40 kg/m2, and Severely reduced renal function    Patient will be admitted as inpatient status due to: Revision Procedure, will require ID consult and 6-7 weeks of IV abx.

## 2024-04-08 ENCOUNTER — ANESTHESIA (OUTPATIENT)
Dept: SURGERY | Facility: HOSPITAL | Age: 79
DRG: 467 | End: 2024-04-08
Payer: MEDICARE

## 2024-04-08 ENCOUNTER — HOSPITAL ENCOUNTER (INPATIENT)
Facility: HOSPITAL | Age: 79
LOS: 4 days | Discharge: HOME HEALTH | DRG: 467 | End: 2024-04-12
Attending: ORTHOPAEDIC SURGERY | Admitting: ORTHOPAEDIC SURGERY
Payer: MEDICARE

## 2024-04-08 DIAGNOSIS — Z96.649 FAILED TOTAL HIP ARTHROPLASTY, INITIAL ENCOUNTER: ICD-10-CM

## 2024-04-08 DIAGNOSIS — N40.0 BENIGN PROSTATIC HYPERPLASIA, UNSPECIFIED WHETHER LOWER URINARY TRACT SYMPTOMS PRESENT: ICD-10-CM

## 2024-04-08 DIAGNOSIS — N18.31 STAGE 3A CHRONIC KIDNEY DISEASE: ICD-10-CM

## 2024-04-08 DIAGNOSIS — G89.29 CHRONIC PAIN OF RIGHT HIP: ICD-10-CM

## 2024-04-08 DIAGNOSIS — Z01.818 PRE-OP TESTING: ICD-10-CM

## 2024-04-08 DIAGNOSIS — T84.019A FAILED TOTAL JOINT REPLACEMENT: ICD-10-CM

## 2024-04-08 DIAGNOSIS — N18.31 TYPE 2 DIABETES MELLITUS WITH STAGE 3A CHRONIC KIDNEY DISEASE, WITHOUT LONG-TERM CURRENT USE OF INSULIN: ICD-10-CM

## 2024-04-08 DIAGNOSIS — M25.551 CHRONIC PAIN OF RIGHT HIP: ICD-10-CM

## 2024-04-08 DIAGNOSIS — E66.01 MORBID OBESITY: ICD-10-CM

## 2024-04-08 DIAGNOSIS — E11.22 TYPE 2 DIABETES MELLITUS WITH STAGE 3A CHRONIC KIDNEY DISEASE, WITHOUT LONG-TERM CURRENT USE OF INSULIN: ICD-10-CM

## 2024-04-08 DIAGNOSIS — Z12.5 PROSTATE CANCER SCREENING: Primary | ICD-10-CM

## 2024-04-08 DIAGNOSIS — T84.018A FAILED TOTAL HIP ARTHROPLASTY, INITIAL ENCOUNTER: ICD-10-CM

## 2024-04-08 LAB
HCT VFR BLD AUTO: 35.2 % (ref 42–52)
HGB BLD-MCNC: 11.1 G/DL (ref 14–18)
POCT GLUCOSE: 140 MG/DL (ref 70–110)
POCT GLUCOSE: 96 MG/DL (ref 70–110)

## 2024-04-08 PROCEDURE — 0SR90J9 REPLACEMENT OF RIGHT HIP JOINT WITH SYNTHETIC SUBSTITUTE, CEMENTED, OPEN APPROACH: ICD-10-PCS | Performed by: ORTHOPAEDIC SURGERY

## 2024-04-08 PROCEDURE — 27134 REVISE HIP JOINT REPLACEMENT: CPT | Mod: RT,,, | Performed by: ORTHOPAEDIC SURGERY

## 2024-04-08 PROCEDURE — 0SP90JZ REMOVAL OF SYNTHETIC SUBSTITUTE FROM RIGHT HIP JOINT, OPEN APPROACH: ICD-10-PCS | Performed by: ORTHOPAEDIC SURGERY

## 2024-04-08 PROCEDURE — 64447 NJX AA&/STRD FEMORAL NRV IMG: CPT | Performed by: ANESTHESIOLOGY

## 2024-04-08 PROCEDURE — 27201423 OPTIME MED/SURG SUP & DEVICES STERILE SUPPLY: Performed by: ORTHOPAEDIC SURGERY

## 2024-04-08 PROCEDURE — 63600175 PHARM REV CODE 636 W HCPCS: Performed by: ORTHOPAEDIC SURGERY

## 2024-04-08 PROCEDURE — 25000003 PHARM REV CODE 250: Performed by: NURSE PRACTITIONER

## 2024-04-08 PROCEDURE — 99900031 HC PATIENT EDUCATION (STAT)

## 2024-04-08 PROCEDURE — 63600175 PHARM REV CODE 636 W HCPCS: Mod: JZ,JG | Performed by: ANESTHESIOLOGY

## 2024-04-08 PROCEDURE — 64447 NJX AA&/STRD FEMORAL NRV IMG: CPT | Mod: 59,RT,, | Performed by: ANESTHESIOLOGY

## 2024-04-08 PROCEDURE — 63600175 PHARM REV CODE 636 W HCPCS: Performed by: ANESTHESIOLOGY

## 2024-04-08 PROCEDURE — 27000221 HC OXYGEN, UP TO 24 HOURS

## 2024-04-08 PROCEDURE — 71000033 HC RECOVERY, INTIAL HOUR: Performed by: ORTHOPAEDIC SURGERY

## 2024-04-08 PROCEDURE — 37000008 HC ANESTHESIA 1ST 15 MINUTES: Performed by: ORTHOPAEDIC SURGERY

## 2024-04-08 PROCEDURE — C1769 GUIDE WIRE: HCPCS | Performed by: ORTHOPAEDIC SURGERY

## 2024-04-08 PROCEDURE — 27134 REVISE HIP JOINT REPLACEMENT: CPT | Mod: AS,RT,, | Performed by: NURSE PRACTITIONER

## 2024-04-08 PROCEDURE — 88300 SURGICAL PATH GROSS: CPT | Performed by: ORTHOPAEDIC SURGERY

## 2024-04-08 PROCEDURE — D9220A PRA ANESTHESIA: Mod: ANES,,, | Performed by: ANESTHESIOLOGY

## 2024-04-08 PROCEDURE — 94761 N-INVAS EAR/PLS OXIMETRY MLT: CPT

## 2024-04-08 PROCEDURE — 87075 CULTR BACTERIA EXCEPT BLOOD: CPT | Performed by: ORTHOPAEDIC SURGERY

## 2024-04-08 PROCEDURE — 25000003 PHARM REV CODE 250: Performed by: ORTHOPAEDIC SURGERY

## 2024-04-08 PROCEDURE — C1713 ANCHOR/SCREW BN/BN,TIS/BN: HCPCS | Performed by: ORTHOPAEDIC SURGERY

## 2024-04-08 PROCEDURE — 11000001 HC ACUTE MED/SURG PRIVATE ROOM

## 2024-04-08 PROCEDURE — 25000003 PHARM REV CODE 250: Performed by: NURSE ANESTHETIST, CERTIFIED REGISTERED

## 2024-04-08 PROCEDURE — 85018 HEMOGLOBIN: CPT | Performed by: ORTHOPAEDIC SURGERY

## 2024-04-08 PROCEDURE — 36000710: Performed by: ORTHOPAEDIC SURGERY

## 2024-04-08 PROCEDURE — 87181 SC STD AGAR DILUTION PER AGT: CPT | Performed by: ORTHOPAEDIC SURGERY

## 2024-04-08 PROCEDURE — 94799 UNLISTED PULMONARY SVC/PX: CPT

## 2024-04-08 PROCEDURE — 63600175 PHARM REV CODE 636 W HCPCS: Performed by: NURSE ANESTHETIST, CERTIFIED REGISTERED

## 2024-04-08 PROCEDURE — 99900035 HC TECH TIME PER 15 MIN (STAT)

## 2024-04-08 PROCEDURE — 36000711: Performed by: ORTHOPAEDIC SURGERY

## 2024-04-08 PROCEDURE — 87205 SMEAR GRAM STAIN: CPT | Performed by: ORTHOPAEDIC SURGERY

## 2024-04-08 PROCEDURE — 37000009 HC ANESTHESIA EA ADD 15 MINS: Performed by: ORTHOPAEDIC SURGERY

## 2024-04-08 PROCEDURE — 63600175 PHARM REV CODE 636 W HCPCS: Performed by: NURSE PRACTITIONER

## 2024-04-08 PROCEDURE — 87070 CULTURE OTHR SPECIMN AEROBIC: CPT | Performed by: ORTHOPAEDIC SURGERY

## 2024-04-08 PROCEDURE — 82962 GLUCOSE BLOOD TEST: CPT | Performed by: ORTHOPAEDIC SURGERY

## 2024-04-08 PROCEDURE — D9220A PRA ANESTHESIA: Mod: CRNA,,, | Performed by: NURSE ANESTHETIST, CERTIFIED REGISTERED

## 2024-04-08 DEVICE — CEMENT BONE ANTIBIO SIMPLEX P: Type: IMPLANTABLE DEVICE | Site: HIP | Status: FUNCTIONAL

## 2024-04-08 DEVICE — BONE VOID FILLER KIT, 20CC
Type: IMPLANTABLE DEVICE | Site: HIP | Status: FUNCTIONAL
Brand: SYNTHECURE

## 2024-04-08 RX ORDER — SODIUM CHLORIDE, SODIUM LACTATE, POTASSIUM CHLORIDE, CALCIUM CHLORIDE 600; 310; 30; 20 MG/100ML; MG/100ML; MG/100ML; MG/100ML
INJECTION, SOLUTION INTRAVENOUS CONTINUOUS
Status: DISCONTINUED | OUTPATIENT
Start: 2024-04-08 | End: 2024-04-08

## 2024-04-08 RX ORDER — DOCUSATE SODIUM 100 MG/1
200 CAPSULE, LIQUID FILLED ORAL DAILY
Status: DISCONTINUED | OUTPATIENT
Start: 2024-04-09 | End: 2024-04-12 | Stop reason: HOSPADM

## 2024-04-08 RX ORDER — GABAPENTIN 300 MG/1
300 CAPSULE ORAL
Status: COMPLETED | OUTPATIENT
Start: 2024-04-08 | End: 2024-04-08

## 2024-04-08 RX ORDER — ONDANSETRON HYDROCHLORIDE 2 MG/ML
4 INJECTION, SOLUTION INTRAVENOUS EVERY 6 HOURS PRN
Status: DISCONTINUED | OUTPATIENT
Start: 2024-04-08 | End: 2024-04-12 | Stop reason: HOSPADM

## 2024-04-08 RX ORDER — ALUMINUM HYDROXIDE, MAGNESIUM HYDROXIDE, AND SIMETHICONE 1200; 120; 1200 MG/30ML; MG/30ML; MG/30ML
30 SUSPENSION ORAL EVERY 6 HOURS PRN
Status: DISCONTINUED | OUTPATIENT
Start: 2024-04-08 | End: 2024-04-12 | Stop reason: HOSPADM

## 2024-04-08 RX ORDER — ACETAMINOPHEN 10 MG/ML
INJECTION, SOLUTION INTRAVENOUS
Status: DISPENSED
Start: 2024-04-08 | End: 2024-04-09

## 2024-04-08 RX ORDER — BISACODYL 10 MG/1
10 SUPPOSITORY RECTAL DAILY
Status: DISPENSED | OUTPATIENT
Start: 2024-04-11 | End: 2024-04-12

## 2024-04-08 RX ORDER — DOXAZOSIN 4 MG/1
4 TABLET ORAL DAILY
Status: DISCONTINUED | OUTPATIENT
Start: 2024-04-09 | End: 2024-04-12 | Stop reason: HOSPADM

## 2024-04-08 RX ORDER — PROPOFOL 10 MG/ML
VIAL (ML) INTRAVENOUS
Status: DISCONTINUED | OUTPATIENT
Start: 2024-04-08 | End: 2024-04-08

## 2024-04-08 RX ORDER — GLUCAGON 1 MG
1 KIT INJECTION
Status: DISCONTINUED | OUTPATIENT
Start: 2024-04-08 | End: 2024-04-12 | Stop reason: HOSPADM

## 2024-04-08 RX ORDER — VASOPRESSIN 20 [USP'U]/ML
INJECTION, SOLUTION INTRAMUSCULAR; SUBCUTANEOUS
Status: DISCONTINUED | OUTPATIENT
Start: 2024-04-08 | End: 2024-04-08

## 2024-04-08 RX ORDER — MEPERIDINE HYDROCHLORIDE 25 MG/ML
6.25 INJECTION INTRAMUSCULAR; INTRAVENOUS; SUBCUTANEOUS ONCE AS NEEDED
Status: DISCONTINUED | OUTPATIENT
Start: 2024-04-08 | End: 2024-04-08

## 2024-04-08 RX ORDER — ROCURONIUM BROMIDE 10 MG/ML
INJECTION, SOLUTION INTRAVENOUS
Status: DISCONTINUED | OUTPATIENT
Start: 2024-04-08 | End: 2024-04-08

## 2024-04-08 RX ORDER — METOCLOPRAMIDE HYDROCHLORIDE 5 MG/ML
10 INJECTION INTRAMUSCULAR; INTRAVENOUS
Status: COMPLETED | OUTPATIENT
Start: 2024-04-08 | End: 2024-04-10

## 2024-04-08 RX ORDER — HYDROCODONE BITARTRATE AND ACETAMINOPHEN 5; 325 MG/1; MG/1
1 TABLET ORAL EVERY 4 HOURS PRN
Status: DISCONTINUED | OUTPATIENT
Start: 2024-04-08 | End: 2024-04-09

## 2024-04-08 RX ORDER — MIDAZOLAM HYDROCHLORIDE 2 MG/2ML
2 INJECTION, SOLUTION INTRAMUSCULAR; INTRAVENOUS ONCE AS NEEDED
Status: DISCONTINUED | OUTPATIENT
Start: 2024-04-08 | End: 2024-04-08

## 2024-04-08 RX ORDER — SODIUM CHLORIDE, SODIUM GLUCONATE, SODIUM ACETATE, POTASSIUM CHLORIDE AND MAGNESIUM CHLORIDE 30; 37; 368; 526; 502 MG/100ML; MG/100ML; MG/100ML; MG/100ML; MG/100ML
INJECTION, SOLUTION INTRAVENOUS CONTINUOUS
Status: CANCELLED | OUTPATIENT
Start: 2024-04-08 | End: 2024-05-08

## 2024-04-08 RX ORDER — ONDANSETRON HYDROCHLORIDE 2 MG/ML
INJECTION, SOLUTION INTRAVENOUS
Status: DISCONTINUED | OUTPATIENT
Start: 2024-04-08 | End: 2024-04-08

## 2024-04-08 RX ORDER — INSULIN ASPART 100 [IU]/ML
1-10 INJECTION, SOLUTION INTRAVENOUS; SUBCUTANEOUS
Status: DISCONTINUED | OUTPATIENT
Start: 2024-04-08 | End: 2024-04-12 | Stop reason: HOSPADM

## 2024-04-08 RX ORDER — KETOROLAC TROMETHAMINE 30 MG/ML
15 INJECTION, SOLUTION INTRAMUSCULAR; INTRAVENOUS EVERY 6 HOURS
Status: DISCONTINUED | OUTPATIENT
Start: 2024-04-09 | End: 2024-04-09

## 2024-04-08 RX ORDER — LIDOCAINE HYDROCHLORIDE 10 MG/ML
INJECTION, SOLUTION EPIDURAL; INFILTRATION; INTRACAUDAL; PERINEURAL
Status: DISCONTINUED | OUTPATIENT
Start: 2024-04-08 | End: 2024-04-08

## 2024-04-08 RX ORDER — IBUPROFEN 200 MG
16 TABLET ORAL
Status: DISCONTINUED | OUTPATIENT
Start: 2024-04-08 | End: 2024-04-12 | Stop reason: HOSPADM

## 2024-04-08 RX ORDER — ROPIVACAINE HYDROCHLORIDE 2 MG/ML
INJECTION, SOLUTION EPIDURAL; INFILTRATION
Status: DISCONTINUED | OUTPATIENT
Start: 2024-04-08 | End: 2024-04-08

## 2024-04-08 RX ORDER — IBUPROFEN 200 MG
24 TABLET ORAL
Status: DISCONTINUED | OUTPATIENT
Start: 2024-04-08 | End: 2024-04-12 | Stop reason: HOSPADM

## 2024-04-08 RX ORDER — OXYCODONE HYDROCHLORIDE 5 MG/1
5 TABLET ORAL EVERY 4 HOURS PRN
Status: DISCONTINUED | OUTPATIENT
Start: 2024-04-08 | End: 2024-04-09

## 2024-04-08 RX ORDER — SODIUM CHLORIDE, SODIUM GLUCONATE, SODIUM ACETATE, POTASSIUM CHLORIDE AND MAGNESIUM CHLORIDE 30; 37; 368; 526; 502 MG/100ML; MG/100ML; MG/100ML; MG/100ML; MG/100ML
INJECTION, SOLUTION INTRAVENOUS CONTINUOUS
Status: DISCONTINUED | OUTPATIENT
Start: 2024-04-08 | End: 2024-04-08

## 2024-04-08 RX ORDER — MORPHINE SULFATE 4 MG/ML
4 INJECTION, SOLUTION INTRAMUSCULAR; INTRAVENOUS
Status: ACTIVE | OUTPATIENT
Start: 2024-04-08 | End: 2024-04-09

## 2024-04-08 RX ORDER — TALC
6 POWDER (GRAM) TOPICAL NIGHTLY PRN
Status: DISCONTINUED | OUTPATIENT
Start: 2024-04-08 | End: 2024-04-12 | Stop reason: HOSPADM

## 2024-04-08 RX ORDER — ROPIVACAINE HYDROCHLORIDE 2 MG/ML
40 INJECTION, SOLUTION EPIDURAL; INFILTRATION ONCE
Status: CANCELLED | OUTPATIENT
Start: 2024-04-08

## 2024-04-08 RX ORDER — GENTAMICIN 40 MG/ML
INJECTION, SOLUTION INTRAMUSCULAR; INTRAVENOUS
Status: DISCONTINUED | OUTPATIENT
Start: 2024-04-08 | End: 2024-04-08 | Stop reason: HOSPADM

## 2024-04-08 RX ORDER — FAMOTIDINE 20 MG/1
20 TABLET, FILM COATED ORAL 2 TIMES DAILY
Status: DISCONTINUED | OUTPATIENT
Start: 2024-04-08 | End: 2024-04-12 | Stop reason: HOSPADM

## 2024-04-08 RX ORDER — CALCIUM CHLORIDE INJECTION 100 MG/ML
INJECTION, SOLUTION INTRAVENOUS
Status: DISCONTINUED | OUTPATIENT
Start: 2024-04-08 | End: 2024-04-08

## 2024-04-08 RX ORDER — HYDROCODONE BITARTRATE AND ACETAMINOPHEN 5; 325 MG/1; MG/1
1 TABLET ORAL
Status: DISCONTINUED | OUTPATIENT
Start: 2024-04-08 | End: 2024-04-08

## 2024-04-08 RX ORDER — FINASTERIDE 5 MG/1
5 TABLET, FILM COATED ORAL DAILY
Status: DISCONTINUED | OUTPATIENT
Start: 2024-04-09 | End: 2024-04-12 | Stop reason: HOSPADM

## 2024-04-08 RX ORDER — GENTAMICIN 40 MG/ML
INJECTION, SOLUTION INTRAMUSCULAR; INTRAVENOUS
Status: DISPENSED
Start: 2024-04-08 | End: 2024-04-09

## 2024-04-08 RX ORDER — PROCHLORPERAZINE EDISYLATE 5 MG/ML
5 INJECTION INTRAMUSCULAR; INTRAVENOUS EVERY 30 MIN PRN
Status: DISCONTINUED | OUTPATIENT
Start: 2024-04-08 | End: 2024-04-08

## 2024-04-08 RX ORDER — VANCOMYCIN HYDROCHLORIDE 1 G/20ML
INJECTION, POWDER, LYOPHILIZED, FOR SOLUTION INTRAVENOUS
Status: DISCONTINUED | OUTPATIENT
Start: 2024-04-08 | End: 2024-04-08 | Stop reason: HOSPADM

## 2024-04-08 RX ORDER — FENTANYL CITRATE 50 UG/ML
INJECTION, SOLUTION INTRAMUSCULAR; INTRAVENOUS
Status: DISCONTINUED | OUTPATIENT
Start: 2024-04-08 | End: 2024-04-08

## 2024-04-08 RX ORDER — HYDROMORPHONE HYDROCHLORIDE 2 MG/ML
0.4 INJECTION, SOLUTION INTRAMUSCULAR; INTRAVENOUS; SUBCUTANEOUS EVERY 5 MIN PRN
Status: DISCONTINUED | OUTPATIENT
Start: 2024-04-08 | End: 2024-04-08

## 2024-04-08 RX ORDER — TRAMADOL HYDROCHLORIDE 50 MG/1
50 TABLET ORAL EVERY 4 HOURS PRN
Status: DISCONTINUED | OUTPATIENT
Start: 2024-04-08 | End: 2024-04-09

## 2024-04-08 RX ORDER — ACETAMINOPHEN 10 MG/ML
1000 INJECTION, SOLUTION INTRAVENOUS ONCE
Status: COMPLETED | OUTPATIENT
Start: 2024-04-08 | End: 2024-04-08

## 2024-04-08 RX ORDER — METHOCARBAMOL 750 MG/1
750 TABLET, FILM COATED ORAL EVERY 8 HOURS PRN
Status: DISCONTINUED | OUTPATIENT
Start: 2024-04-08 | End: 2024-04-12 | Stop reason: HOSPADM

## 2024-04-08 RX ORDER — ROPIVACAINE HYDROCHLORIDE 2 MG/ML
INJECTION, SOLUTION EPIDURAL; INFILTRATION; PERINEURAL
Status: COMPLETED
Start: 2024-04-08 | End: 2024-04-08

## 2024-04-08 RX ORDER — PIOGLITAZONEHYDROCHLORIDE 15 MG/1
30 TABLET ORAL DAILY
Status: DISCONTINUED | OUTPATIENT
Start: 2024-04-09 | End: 2024-04-12 | Stop reason: HOSPADM

## 2024-04-08 RX ORDER — GABAPENTIN 300 MG/1
300 CAPSULE ORAL NIGHTLY
Status: DISCONTINUED | OUTPATIENT
Start: 2024-04-08 | End: 2024-04-12 | Stop reason: HOSPADM

## 2024-04-08 RX ORDER — ONDANSETRON HYDROCHLORIDE 2 MG/ML
4 INJECTION, SOLUTION INTRAVENOUS DAILY PRN
Status: DISCONTINUED | OUTPATIENT
Start: 2024-04-08 | End: 2024-04-08

## 2024-04-08 RX ORDER — LACTULOSE 10 G/15ML
20 SOLUTION ORAL EVERY 6 HOURS PRN
Status: DISCONTINUED | OUTPATIENT
Start: 2024-04-08 | End: 2024-04-12 | Stop reason: HOSPADM

## 2024-04-08 RX ORDER — SCOLOPAMINE TRANSDERMAL SYSTEM 1 MG/1
1 PATCH, EXTENDED RELEASE TRANSDERMAL ONCE AS NEEDED
Status: DISCONTINUED | OUTPATIENT
Start: 2024-04-08 | End: 2024-04-08 | Stop reason: HOSPADM

## 2024-04-08 RX ORDER — VANCOMYCIN HYDROCHLORIDE 1 G/20ML
INJECTION, POWDER, LYOPHILIZED, FOR SOLUTION INTRAVENOUS
Status: DISPENSED
Start: 2024-04-08 | End: 2024-04-09

## 2024-04-08 RX ORDER — ACETAMINOPHEN 500 MG
500 TABLET ORAL EVERY 4 HOURS
Status: DISCONTINUED | OUTPATIENT
Start: 2024-04-09 | End: 2024-04-09

## 2024-04-08 RX ORDER — POLYETHYLENE GLYCOL 3350 17 G/17G
17 POWDER, FOR SOLUTION ORAL NIGHTLY
Status: DISCONTINUED | OUTPATIENT
Start: 2024-04-08 | End: 2024-04-12 | Stop reason: HOSPADM

## 2024-04-08 RX ORDER — ONDANSETRON 4 MG/1
4 TABLET, ORALLY DISINTEGRATING ORAL
Status: COMPLETED | OUTPATIENT
Start: 2024-04-08 | End: 2024-04-08

## 2024-04-08 RX ORDER — ACETAMINOPHEN 500 MG
1000 TABLET ORAL
Status: COMPLETED | OUTPATIENT
Start: 2024-04-08 | End: 2024-04-08

## 2024-04-08 RX ORDER — ACETAMINOPHEN 10 MG/ML
1000 INJECTION, SOLUTION INTRAVENOUS ONCE
Status: COMPLETED | OUTPATIENT
Start: 2024-04-09 | End: 2024-04-09

## 2024-04-08 RX ORDER — TRANEXAMIC ACID 650 MG/1
1950 TABLET ORAL
Status: COMPLETED | OUTPATIENT
Start: 2024-04-08 | End: 2024-04-08

## 2024-04-08 RX ORDER — AMOXICILLIN 250 MG
2 CAPSULE ORAL 2 TIMES DAILY
Status: DISCONTINUED | OUTPATIENT
Start: 2024-04-08 | End: 2024-04-12 | Stop reason: HOSPADM

## 2024-04-08 RX ORDER — KETOROLAC TROMETHAMINE 10 MG/1
10 TABLET, FILM COATED ORAL
Status: COMPLETED | OUTPATIENT
Start: 2024-04-08 | End: 2024-04-08

## 2024-04-08 RX ORDER — SODIUM CHLORIDE 9 MG/ML
INJECTION, SOLUTION INTRAVENOUS CONTINUOUS
Status: DISCONTINUED | OUTPATIENT
Start: 2024-04-08 | End: 2024-04-12 | Stop reason: HOSPADM

## 2024-04-08 RX ORDER — PHENYLEPHRINE HYDROCHLORIDE 10 MG/ML
INJECTION INTRAVENOUS
Status: DISCONTINUED | OUTPATIENT
Start: 2024-04-08 | End: 2024-04-08

## 2024-04-08 RX ADMIN — ONDANSETRON 4 MG: 4 TABLET, ORALLY DISINTEGRATING ORAL at 09:04

## 2024-04-08 RX ADMIN — METOCLOPRAMIDE 10 MG: 5 INJECTION, SOLUTION INTRAMUSCULAR; INTRAVENOUS at 08:04

## 2024-04-08 RX ADMIN — GABAPENTIN 300 MG: 300 CAPSULE ORAL at 09:04

## 2024-04-08 RX ADMIN — ONDANSETRON HYDROCHLORIDE 4 MG: 2 SOLUTION INTRAMUSCULAR; INTRAVENOUS at 02:04

## 2024-04-08 RX ADMIN — VASOPRESSIN 1 UNITS: 20 INJECTION INTRAVENOUS at 03:04

## 2024-04-08 RX ADMIN — SODIUM CHLORIDE: 9 INJECTION, SOLUTION INTRAVENOUS at 05:04

## 2024-04-08 RX ADMIN — SENNOSIDES AND DOCUSATE SODIUM 2 TABLET: 8.6; 5 TABLET ORAL at 08:04

## 2024-04-08 RX ADMIN — CALCIUM CHLORIDE INJECTION 100 MG: 100 INJECTION, SOLUTION INTRAVENOUS at 03:04

## 2024-04-08 RX ADMIN — SODIUM CHLORIDE: 9 INJECTION, SOLUTION INTRAVENOUS at 03:04

## 2024-04-08 RX ADMIN — FENTANYL CITRATE 25 MCG: 50 INJECTION, SOLUTION INTRAMUSCULAR; INTRAVENOUS at 03:04

## 2024-04-08 RX ADMIN — ROPIVACAINE HYDROCHLORIDE 40 ML: 2 INJECTION, SOLUTION EPIDURAL; INFILTRATION at 04:04

## 2024-04-08 RX ADMIN — ROCURONIUM BROMIDE 70 MG: 10 SOLUTION INTRAVENOUS at 01:04

## 2024-04-08 RX ADMIN — SODIUM CHLORIDE, SODIUM GLUCONATE, SODIUM ACETATE, POTASSIUM CHLORIDE AND MAGNESIUM CHLORIDE: 526; 502; 368; 37; 30 INJECTION, SOLUTION INTRAVENOUS at 09:04

## 2024-04-08 RX ADMIN — FENTANYL CITRATE 50 MCG: 50 INJECTION, SOLUTION INTRAMUSCULAR; INTRAVENOUS at 03:04

## 2024-04-08 RX ADMIN — KETOROLAC TROMETHAMINE 15 MG: 30 INJECTION, SOLUTION INTRAMUSCULAR at 11:04

## 2024-04-08 RX ADMIN — ACETAMINOPHEN 1000 MG: 10 INJECTION INTRAVENOUS at 04:04

## 2024-04-08 RX ADMIN — ACETAMINOPHEN 1000 MG: 10 INJECTION INTRAVENOUS at 11:04

## 2024-04-08 RX ADMIN — KETOROLAC TROMETHAMINE 10 MG: 10 TABLET, FILM COATED ORAL at 09:04

## 2024-04-08 RX ADMIN — FENTANYL CITRATE 50 MCG: 50 INJECTION, SOLUTION INTRAMUSCULAR; INTRAVENOUS at 02:04

## 2024-04-08 RX ADMIN — GABAPENTIN 300 MG: 300 CAPSULE ORAL at 08:04

## 2024-04-08 RX ADMIN — PHENYLEPHRINE HYDROCHLORIDE 100 MCG: 10 INJECTION INTRAVENOUS at 01:04

## 2024-04-08 RX ADMIN — LIDOCAINE HYDROCHLORIDE 50 MG: 10 INJECTION, SOLUTION EPIDURAL; INFILTRATION; INTRACAUDAL; PERINEURAL at 01:04

## 2024-04-08 RX ADMIN — SODIUM CHLORIDE: 9 INJECTION, SOLUTION INTRAVENOUS at 01:04

## 2024-04-08 RX ADMIN — ACETAMINOPHEN 1000 MG: 500 TABLET ORAL at 09:04

## 2024-04-08 RX ADMIN — CEFAZOLIN 2 G: 2 INJECTION, POWDER, FOR SOLUTION INTRAMUSCULAR; INTRAVENOUS at 01:04

## 2024-04-08 RX ADMIN — FENTANYL CITRATE 50 MCG: 50 INJECTION, SOLUTION INTRAMUSCULAR; INTRAVENOUS at 01:04

## 2024-04-08 RX ADMIN — CEFAZOLIN 2 G: 2 INJECTION, POWDER, FOR SOLUTION INTRAMUSCULAR; INTRAVENOUS at 08:04

## 2024-04-08 RX ADMIN — SUGAMMADEX 200 MG: 100 INJECTION, SOLUTION INTRAVENOUS at 04:04

## 2024-04-08 RX ADMIN — PROPOFOL 150 MG: 10 INJECTION, EMULSION INTRAVENOUS at 01:04

## 2024-04-08 RX ADMIN — FAMOTIDINE 20 MG: 20 TABLET ORAL at 08:04

## 2024-04-08 RX ADMIN — APIXABAN 5 MG: 5 TABLET, FILM COATED ORAL at 08:04

## 2024-04-08 RX ADMIN — TRANEXAMIC ACID 1950 MG: 650 TABLET ORAL at 09:04

## 2024-04-08 NOTE — OP NOTE
OPERATIVE REPORT      Patient: Chris Mendoza II   : 1945    MRN: [id number]  Date: 2024      Surgeon: Jose Luna MD  Assistant: Little Contreras NP, CNFA.  Certified first assist was necessary as a skilled set of hands and was necessary for proper patient positioning as well as assistance with closure in place with multiple implants.  Preoperative Diagnosis:  Right infected total hip arthroplasty  Postoperative Diagnosis: Same  Procedure:  Revision right total hip arthroplasty, both components  Placement antibiotic delivery device  Synovectomy right hip complete  Wound VAC right hip less than 50 cm2  Anesthesiologist: Jana Rios MD  OR Staff: Circulator: Tobi Roca RN  Nurse Practitioner: Little Contreras FNP  Scrub Person: Kate Mcclellan ST; Arminda Saravia ST  Implants:   Implant Name Type Inv. Item Serial No.  Lot No. LRB No. Used Action   drumbiECURE BONE VOID FILLER KIT     [id number] Right 1 Implanted   GUIDE WIRE 3.1S1887OW BALL TIP - [id number]  GUIDE WIRE [id number] BALL TIP  CHANDA DNAtriX LAYLA. [id number] Right 1 Implanted and Explanted   CEMENTED HIP STEM     [id number] Right 1 Implanted   RIMFIT UP     [id number] Right 1 Implanted   CEMENT BONE ANTIBIO SIMPLEX P - [id number]  CEMENT BONE ANTIBIO SIMPLEX P  CHANDA SALES LAYLA. [id number] Right 2 Implanted   C TAPER FEMORAL HEAD     [id number] Right 1 Implanted   RT HIP EXPLANTS X 3      Right 3 Explanted     EBL: See anesthesia note  Complications: None  Disposition: To PACU, stable    Indications: Chris Mendoza II is a 78 y.o. male presenting with the aforementioned injuries/findings. The procedure is indicated to stabilize right hip.  Patient with a history of total hip arthroplasty performed years ago.  Patient had acute postoperative infection with irrigation debridement.  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  He had an antibiotic  spacer placed about 7 years ago.  Patient did quite well however had worsening and severe pain.  Presented to the office.  Workup revealed a recurrence of his infection.  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  Discussed all treatment options the patient.  Risks, benefits alternatives to revision total hip arthroplasty were discussed in detail.  All questions answered.  No guarantees made.  Despite these risks the patient elected proceed with surgical intervention.  The patient is awake and alert. After thorough discussion of the risks, benefits, expected outcomes, and alternatives to surgical intervention, the patient agreed to proceed with surgical treatment.  Specific risks discussed included, but were not limited to: superficial or deep infection, wound healing complications, DVT/PE, significant bleeding requiring transfusion, damage to named anatomic structures in the immediate area including named neurovascular structures, infection, nonunion, malunion and general risks of anesthesia.  The patient voiced understanding and written as well as verbal consent was obtained by myself prior to the procedure.    Procedure Note:  The patient was brought back to the OR and placed supine on the OR table. After successful induction of anesthesia by anesthesia staff, the patient was positioned in the lateral decubitus position and all bony prominences were padded appropriately.  The surgical field was then provisionally cleansed and then prepped and draped in the usual sterile fashion.    At this time a time-out was performed, with the correct patient, site, and procedure identified.  The universal time out as well as sign your site protocols were followed.  Preoperative antibiotics were verified as administered.     Patient's incision was opened in its entirety.  It was taken through skin subcutaneous tissue.  Incision was carried through the fascial plane.  Charnley retractor was placed..  Down to the short  external rotators.  These were released off the proximal femur.  Hip was fully internally rotated.  Eventually able to be dislocated.  We then find purulence upon entering his joint capsule.  We removed the femoral head.  Evaluated the femoral component.  It was well fixed.  Using osteotome to remove the surrounding cement mantle of the femoral component was able to be removed.  We then turned our attention to the acetabulum.  It was acetabulum was grossly loose.  We cleaned up the surrounding soft tissue around the acetabulum.  Osteotome was used to remove the acetabular in its entirety.  Has a large amount of interposing soft tissue in the acetabular vault.  This was cleaned up with a curette as well as sharply with a knife and rongeur.  We then turned our attention to the femur.  We would removed the remaining cement mantle.  We had a drill through a pedestal in the femoral canal.  We reamed with reamers to open this up.  We broached to get good debridement.  Broached up to a size 8.  Following this we performed a complete synovectomy surrounding the acetabulum with a of the femur.  All filled.  Tissue was removed and debrided.  We are completed with this, we copiously irrigated with 3 L normal saline.  We then irrigated with Betadine lavage followed by IrriSept lavage followed by experienced lavage.  Each of the solution were allowed to sit.  We changed instruments.  We changed gloves.  We changed drapes.  We opened up implants.  We would upsized increase his acetabular size to a 58.  We increased the femoral sized to a size 8.  We mixed up 2 bags of Simplex tobramycin cement.  We would placed cement in the acetabular vault after cleaning it and drying it.  We placed our cemented the cup into position held into position for the cement to dry.  Resected all excess cement.  We then cemented the acetabulum proximally.  It was somewhat proud which was on advised the design give him subsidence of the previous  component.  We reached trialed up with a 0 40 mm head.  This was reduced.  Taken through range of motion.  Felt stable.  We packed the joint vault with antibiotic beads complaint containing gentamicin and vancomycin.  We then closed the short external rotators as well as the fascia with PDS and Stratafix suture.  Monocryl subcutaneous tissue.  It was staples in the skin.    Due to revision nature and poor wound healing capability, prevena wound vac applied to the incision to assist with wound closure and decrease infection risk.        The patient was then subsequently transferred to to PACU in a stable condition.    All sponge and needle counts were correct at the end of the case.  I was present and participated in all aspects of the procedure.    Prognosis:  The patient will be kept PWB on the ipsilateral extremity for approximately 4-6 weeks .  Patient will receive DVT prophylaxis .       This note/OR report was created with the assistance of  voice recognition software or phone  dictation.  There may be transcription errors as a result of using this technology however minimal. Effort has been made to assure accuracy of transcription but any obvious errors or omissions should be clarified with the author of the document.

## 2024-04-08 NOTE — ANESTHESIA POSTPROCEDURE EVALUATION
Anesthesia Post Evaluation    Patient: Chris Mendoza II    Procedure(s) Performed: Procedure(s) (LRB):  REVISION, TOTAL ARTHROPLASTY, HIP - revision R COLLIN with abx spacer (Right)    Final Anesthesia Type: spinal      Patient location during evaluation: PACU  Patient participation: Yes- Able to Participate  Level of consciousness: awake and alert  Post-procedure vital signs: reviewed and stable  Pain management: adequate (postop fascia iliaca block)  Airway patency: patent    PONV status at discharge: No PONV  Anesthetic complications: no      Cardiovascular status: blood pressure returned to baseline and hemodynamically stable  Respiratory status: unassisted and spontaneous ventilation                Vitals Value Taken Time   /72 04/08/24 1620   Temp 36 °C (96.8 °F) 04/08/24 1615   Pulse 86 04/08/24 1628   Resp 11 04/08/24 1628   SpO2 100 % 04/08/24 1628   Vitals shown include unvalidated device data.      No case tracking events are documented in the log.      Pain/Shannon Score: Pain Rating Prior to Med Admin: 10 (4/8/2024  9:33 AM)

## 2024-04-08 NOTE — ANESTHESIA PROCEDURE NOTES
Intubation    Date/Time: 4/8/2024 1:18 PM    Performed by: Tyler Viera CRNA  Authorized by: Jana Rios MD    Intubation:     Induction:  Rapid sequence induction    Intubated:  Postinduction    Mask Ventilation:  Not attempted    Attempts:  1    Attempted By:  CRNA    Method of Intubation:  Direct    Blade:  Cuevas 2    Laryngeal View Grade: Grade IIb - only the arytenoids and epiglottis seen      Difficult Airway Encountered?: No      Complications:  None    Airway Device:  Oral endotracheal tube    Airway Device Size:  7.5    Style/Cuff Inflation:  Cuffed (inflated to minimal occlusive pressure)    Tube secured:  23    Secured at:  The lips    Placement Verified By:  Capnometry    Complicating Factors:  Poor neck/head extension and obesity    Findings Post-Intubation:  BS equal bilateral and atraumatic/condition of teeth unchanged      
Peripheral Block    Patient location during procedure: pre-op   Block not for primary anesthetic.  Reason for block: at surgeon's request and post-op pain management   Post-op Pain Location: Right HIp   Start time: 4/8/2024 4:25 PM  Timeout: 4/8/2024 4:24 PM   End time: 4/8/2024 4:26 PM    Staffing  Authorizing Provider: Jana Rios MD  Performing Provider: Jana Rios MD    Staffing  Performed by: Jana Rios MD  Authorized by: Jana Rios MD    Preanesthetic Checklist  Completed: patient identified, IV checked, site marked, risks and benefits discussed, surgical consent, monitors and equipment checked, pre-op evaluation and timeout performed  Peripheral Block  Patient position: supine  Prep: ChloraPrep  Patient monitoring: heart rate, cardiac monitor, continuous pulse ox, continuous capnometry and frequent blood pressure checks  Block type: fascia iliaca  Laterality: right  Injection technique: single shot  Needle  Needle type: Stimuplex   Needle gauge: 22 G  Needle length: 2 in  Needle localization: anatomical landmarks and ultrasound guidance   -ultrasound image captured on disc.  Assessment  Injection assessment: negative aspiration, negative parasthesia and local visualized surrounding nerve  Paresthesia pain: none  Heart rate change: no  Slow fractionated injection: yes        Additional Notes  VSS.  [facility] RN monitoring vitals throughout procedure.  Patient tolerated procedure well.               
HEADACHE

## 2024-04-08 NOTE — ANESTHESIA PREPROCEDURE EVALUATION
04/08/2024  Chris Mendoza II is a 78 y.o., male with ----------------------------  Arthritis  Atrial fibrillation  Bladder cancer  CKD (chronic kidney disease)  Diabetes mellitus, type 2  Gout, unspecified  Hyperlipidemia  Hypertension  Restless leg syndrome  Seasonal allergies  Sleep apnea, unspecified  Thyroid disease  Obesity    And ----------------------------  Arthrogram      Comment:  Procedure: ARTHROGRAM  aspiration right hip with                anesthesia #1;  Surgeon: Jose Luna MD;  Location:               [facility];  Service: Orthopedics;  Laterality: Right;                 aspiration right hip with anesthesia  Bladder surgery  Cardiac catheterization  Cardioversion  Cataract extraction w/  intraocular lens implant  Joint replacement      Comment:  Total Hip  Multiple tooth extractions  Vein surgery    Presents for revision of right COLLIN with abx spacer     Cleared by cardiology         Pre-op Assessment    I have reviewed the NPO Status.      Review of Systems  Cardiovascular:     Hypertension                                  Hypertension     Atrial Fibrillation     Pulmonary:        Sleep Apnea     Obstructive Sleep Apnea (EVA).           Renal/:  Chronic Renal Disease        Kidney Function/Disease             Endocrine:  Diabetes    Diabetes                          Physical Exam  General: Well nourished, Cooperative, Alert and Oriented    Airway:  Mallampati: III   Mouth Opening: Normal  TM Distance: Normal  Tongue: Normal  Neck ROM: Normal ROM  Neck: Girth Increased  Fully bearded face  Dental:  Intact  Multiple missing  Chest/Lungs:  Clear to auscultation, Normal Respiratory Rate    Heart:  Rate: Normal  Rhythm: Irregularly Irregular       Latest Reference Range & Units 03/26/24 10:30   WBC 4.50 - 11.50 x10(3)/mcL 5.77   Hemoglobin 14.0 - 18.0 g/dL 13.4 (L)   Hematocrit 42.0 - 52.0 %  42.1   Platelet Count 130 - 400 x10(3)/mcL 176   Sodium 136 - 145 mmol/L 141   Potassium 3.5 - 5.1 mmol/L 4.2   Chloride 98 - 107 mmol/L 109 (H)   CO2 23 - 31 mmol/L 23   BUN 8.4 - 25.7 mg/dL 14.1   Creatinine 0.73 - 1.18 mg/dL 1.22 (H)   eGFR mls/min/1.73/m2 >60   Glucose 82 - 115 mg/dL 76 (L)   (L): Data is abnormally low  (H): Data is abnormally high      Anesthesia Plan  Type of Anesthesia, risks & benefits discussed:    Anesthesia Type: Gen ETT  Intra-op Monitoring Plan: Standard ASA Monitors  Post Op Pain Control Plan: multimodal analgesia  Airway Plan: Video  Informed Consent: Patient consented to blood products? Yes  ASA Score: 3  Day of Surgery Review of History & Physical: H&P Update referred to the surgeon/provider.  Anesthesia Plan Notes: ERAS ordered by surgeon according to Total [facility] protocol  Spinal may be difficult due to positioning - will require GETA Video scope available  Goal directed IV Fluid therapy  No philippe necessary unless hx of BPH/urinary retention   Fascia Iliaca block in pacu for postop pain control with ropiv 0.1% 40mls     Ready For Surgery From Anesthesia Perspective.     .

## 2024-04-08 NOTE — PLAN OF CARE
Problem: Adult Inpatient Plan of Care  Goal: Plan of Care Review  Outcome: Ongoing, Progressing  Goal: Patient-Specific Goal (Individualized)  Outcome: Ongoing, Progressing  Goal: Absence of Hospital-Acquired Illness or Injury  Outcome: Ongoing, Progressing  Goal: Optimal Comfort and Wellbeing  Outcome: Ongoing, Progressing  Goal: Readiness for Transition of Care  Outcome: Ongoing, Progressing     Problem: Diabetes Comorbidity  Goal: Blood Glucose Level Within Targeted Range  Outcome: Ongoing, Progressing     Problem: Bariatric Environmental Safety  Goal: Safety Maintained with Care  Outcome: Ongoing, Progressing     Problem: Infection  Goal: Absence of Infection Signs and Symptoms  Outcome: Ongoing, Progressing     Problem: Hypertension Comorbidity  Goal: Blood Pressure in Desired Range  Outcome: Ongoing, Progressing     Problem: Obstructive Sleep Apnea Risk or Actual Comorbidity Management  Goal: Unobstructed Breathing During Sleep  Outcome: Ongoing, Progressing

## 2024-04-08 NOTE — PT/OT/SLP PROGRESS
Physical Therapy      Patient Name:  Chris Ramseyr II   MRN:  [id number]    Patient not seen today secondary to late surgery, pt unsafe to ambulate at this time. Will follow-up tomorrow AM.

## 2024-04-08 NOTE — TRANSFER OF CARE
Anesthesia Transfer of Care Note    Patient: Chris Ramseyr II    Procedure(s) Performed: Procedure(s) (LRB):  REVISION, TOTAL ARTHROPLASTY, HIP - revision R COLLIN with abx spacer (Right)    Patient location: PACU    Anesthesia Type: general    Transport from OR: Transported from OR on room air with adequate spontaneous ventilation    Post pain: adequate analgesia    Post assessment: no apparent anesthetic complications and tolerated procedure well    Post vital signs: stable    Level of consciousness: awake and alert    Nausea/Vomiting: no nausea/vomiting    Complications: none    Transfer of care protocol was followed

## 2024-04-08 NOTE — NURSING
Nurses Note -- 4 Eyes      4/8/2024   6:20 PM      Skin assessed during: Admit      [x] No Altered Skin Integrity Present    []Prevention Measures Documented      [] Yes- Altered Skin Integrity Present or Discovered   [] LDA Added if Not in Epic (Describe Wound)   [] New Altered Skin Integrity was Present on Admit and Documented in LDA   [] Wound Image Taken    Wound Care Consulted? No    Attending Nurse:  Malka Issa RN/Staff Member:  Isabella

## 2024-04-09 LAB
ANION GAP SERPL CALC-SCNC: 9 MEQ/L
BUN SERPL-MCNC: 15.5 MG/DL (ref 8.4–25.7)
CALCIUM SERPL-MCNC: 8.7 MG/DL (ref 8.8–10)
CHLORIDE SERPL-SCNC: 108 MMOL/L (ref 98–107)
CO2 SERPL-SCNC: 21 MMOL/L (ref 23–31)
CREAT SERPL-MCNC: 1.22 MG/DL (ref 0.73–1.18)
CREAT/UREA NIT SERPL: 13
ERYTHROCYTE [DISTWIDTH] IN BLOOD BY AUTOMATED COUNT: 15.9 % (ref 11.5–17)
GFR SERPLBLD CREATININE-BSD FMLA CKD-EPI: >60 MLS/MIN/1.73/M2
GLUCOSE SERPL-MCNC: 85 MG/DL (ref 82–115)
GRAM STN SPEC: NORMAL
HCT VFR BLD AUTO: 32 % (ref 42–52)
HGB BLD-MCNC: 10.1 G/DL (ref 14–18)
MCH RBC QN AUTO: 28.3 PG (ref 27–31)
MCHC RBC AUTO-ENTMCNC: 31.6 G/DL (ref 33–36)
MCV RBC AUTO: 89.6 FL (ref 80–94)
NRBC BLD AUTO-RTO: 0 %
PLATELET # BLD AUTO: 147 X10(3)/MCL (ref 130–400)
PMV BLD AUTO: 10.8 FL (ref 7.4–10.4)
POCT GLUCOSE: 107 MG/DL (ref 70–110)
POCT GLUCOSE: 124 MG/DL (ref 70–110)
POTASSIUM SERPL-SCNC: 4.1 MMOL/L (ref 3.5–5.1)
RBC # BLD AUTO: 3.57 X10(6)/MCL (ref 4.7–6.1)
SODIUM SERPL-SCNC: 138 MMOL/L (ref 136–145)
WBC # SPEC AUTO: 6.34 X10(3)/MCL (ref 4.5–11.5)

## 2024-04-09 PROCEDURE — 94799 UNLISTED PULMONARY SVC/PX: CPT

## 2024-04-09 PROCEDURE — 25000003 PHARM REV CODE 250: Performed by: GENERAL PRACTICE

## 2024-04-09 PROCEDURE — 85027 COMPLETE CBC AUTOMATED: CPT | Performed by: ORTHOPAEDIC SURGERY

## 2024-04-09 PROCEDURE — 80048 BASIC METABOLIC PNL TOTAL CA: CPT | Performed by: ORTHOPAEDIC SURGERY

## 2024-04-09 PROCEDURE — 94761 N-INVAS EAR/PLS OXIMETRY MLT: CPT

## 2024-04-09 PROCEDURE — 25000003 PHARM REV CODE 250: Performed by: NURSE PRACTITIONER

## 2024-04-09 PROCEDURE — 87040 BLOOD CULTURE FOR BACTERIA: CPT | Performed by: GENERAL PRACTICE

## 2024-04-09 PROCEDURE — 99900031 HC PATIENT EDUCATION (STAT)

## 2024-04-09 PROCEDURE — 97535 SELF CARE MNGMENT TRAINING: CPT

## 2024-04-09 PROCEDURE — 63600175 PHARM REV CODE 636 W HCPCS: Performed by: ORTHOPAEDIC SURGERY

## 2024-04-09 PROCEDURE — 63600175 PHARM REV CODE 636 W HCPCS: Performed by: GENERAL PRACTICE

## 2024-04-09 PROCEDURE — 11000001 HC ACUTE MED/SURG PRIVATE ROOM

## 2024-04-09 PROCEDURE — 97530 THERAPEUTIC ACTIVITIES: CPT | Mod: CQ

## 2024-04-09 PROCEDURE — 25000003 PHARM REV CODE 250: Performed by: ORTHOPAEDIC SURGERY

## 2024-04-09 PROCEDURE — 97166 OT EVAL MOD COMPLEX 45 MIN: CPT

## 2024-04-09 PROCEDURE — 97162 PT EVAL MOD COMPLEX 30 MIN: CPT

## 2024-04-09 RX ORDER — TRAMADOL HYDROCHLORIDE 50 MG/1
50 TABLET ORAL EVERY 4 HOURS PRN
Status: DISCONTINUED | OUTPATIENT
Start: 2024-04-09 | End: 2024-04-12 | Stop reason: HOSPADM

## 2024-04-09 RX ORDER — ACETAMINOPHEN 500 MG
500 TABLET ORAL
Status: DISCONTINUED | OUTPATIENT
Start: 2024-04-09 | End: 2024-04-12 | Stop reason: HOSPADM

## 2024-04-09 RX ADMIN — SENNOSIDES AND DOCUSATE SODIUM 2 TABLET: 8.6; 5 TABLET ORAL at 08:04

## 2024-04-09 RX ADMIN — FINASTERIDE 5 MG: 5 TABLET, FILM COATED ORAL at 08:04

## 2024-04-09 RX ADMIN — DOXAZOSIN 4 MG: 4 TABLET ORAL at 08:04

## 2024-04-09 RX ADMIN — DOCUSATE SODIUM 200 MG: 100 CAPSULE, LIQUID FILLED ORAL at 05:04

## 2024-04-09 RX ADMIN — VANCOMYCIN HYDROCHLORIDE 1000 MG: 1 INJECTION, POWDER, LYOPHILIZED, FOR SOLUTION INTRAVENOUS at 01:04

## 2024-04-09 RX ADMIN — METOCLOPRAMIDE 10 MG: 5 INJECTION, SOLUTION INTRAMUSCULAR; INTRAVENOUS at 02:04

## 2024-04-09 RX ADMIN — KETOROLAC TROMETHAMINE 15 MG: 30 INJECTION, SOLUTION INTRAMUSCULAR at 05:04

## 2024-04-09 RX ADMIN — POLYETHYLENE GLYCOL 3350 17 G: 17 POWDER, FOR SOLUTION ORAL at 08:04

## 2024-04-09 RX ADMIN — PIOGLITAZONE 30 MG: 15 TABLET ORAL at 08:04

## 2024-04-09 RX ADMIN — VANCOMYCIN HYDROCHLORIDE 1000 MG: 1 INJECTION, POWDER, LYOPHILIZED, FOR SOLUTION INTRAVENOUS at 04:04

## 2024-04-09 RX ADMIN — METOCLOPRAMIDE 10 MG: 5 INJECTION, SOLUTION INTRAMUSCULAR; INTRAVENOUS at 08:04

## 2024-04-09 RX ADMIN — METHOCARBAMOL TABLETS 750 MG: 750 TABLET, COATED ORAL at 06:04

## 2024-04-09 RX ADMIN — APIXABAN 5 MG: 5 TABLET, FILM COATED ORAL at 08:04

## 2024-04-09 RX ADMIN — TRAMADOL HYDROCHLORIDE 50 MG: 50 TABLET, COATED ORAL at 10:04

## 2024-04-09 RX ADMIN — FAMOTIDINE 20 MG: 20 TABLET ORAL at 08:04

## 2024-04-09 RX ADMIN — ACETAMINOPHEN 500 MG: 500 TABLET ORAL at 02:04

## 2024-04-09 RX ADMIN — TRAMADOL HYDROCHLORIDE 50 MG: 50 TABLET, COATED ORAL at 08:04

## 2024-04-09 RX ADMIN — ACETAMINOPHEN 500 MG: 500 TABLET ORAL at 10:04

## 2024-04-09 RX ADMIN — ACETAMINOPHEN 500 MG: 500 TABLET ORAL at 11:04

## 2024-04-09 RX ADMIN — CEFAZOLIN 2 G: 2 INJECTION, POWDER, FOR SOLUTION INTRAMUSCULAR; INTRAVENOUS at 02:04

## 2024-04-09 RX ADMIN — TRAMADOL HYDROCHLORIDE 50 MG: 50 TABLET, COATED ORAL at 06:04

## 2024-04-09 RX ADMIN — ACETAMINOPHEN 500 MG: 500 TABLET ORAL at 08:04

## 2024-04-09 RX ADMIN — CEFAZOLIN 2 G: 2 INJECTION, POWDER, FOR SOLUTION INTRAMUSCULAR; INTRAVENOUS at 08:04

## 2024-04-09 RX ADMIN — GABAPENTIN 300 MG: 300 CAPSULE ORAL at 08:04

## 2024-04-09 NOTE — PROGRESS NOTES
No acute events overnight.  Pain controlled.  Resting in bed. Feeling better than pre-op    Vital Signs  Temp: 98.3 °F (36.8 °C)  Temp Source: Oral  Pulse: 85  Heart Rate Source: Monitor  Resp: 18  SpO2: 95 %  Pulse Oximetry Type: Intermittent  Flow (L/min): 10  Oxygen Concentration (%): 80  Device (Oxygen Therapy): room air  BP: (!) 94/56  BP Location: Left arm  BP Method: Automatic  Patient Position: Lying  Arousal Level: arouses to voice  Height and Weight  Height: 6' (182.9 cm)  Height Method: Stated  Weight: 103.9 kg (229 lb)  Weight Method: Standard Scale  BSA (Calculated - sq m): 2.3 sq meters  BMI (Calculated): 31.1  Weight in (lb) to have BMI = 25: 183.9]    +FHL/EHL  BCR distally  Dressing c/d/i  SILT distally    Recent Lab Results  (Last 5 results in the past 24 hours)        04/09/24  0432   04/08/24  2047   04/08/24  1637   04/08/24  1625   04/08/24  1412        Aerobic Culture - Tissue         No Growth At 24 Hours  [P]                No Growth At 24 Hours  [P]       Anion Gap 9.0               BUN 15.5               BUN/CREAT RATIO 13               Calcium 8.7               Chloride 108               CO2 21               Creatinine 1.22               eGFR >60               Glucose 85               Hematocrit 32.0     35.2           Hemoglobin 10.1     11.1           MCH 28.3               MCHC 31.6               MCV 89.6               MPV 10.8               nRBC 0.0               Platelet Count 147               POCT Glucose   107     140         Potassium 4.1               RBC 3.57               RDW 15.9               Sodium 138               WBC 6.34                                       [P] - Preliminary Result               A/P:  Status post revision R COLLIN for infection  Pain controlled  Overall patient doing well.  Therapy for mobility and ambulation.  DVT PPx  Continue abx  ID consult  CKD - monitor renal function

## 2024-04-09 NOTE — PLAN OF CARE
04/09/24 1233   Discharge Assessment   Assessment Type Discharge Planning Assessment   Source of Information patient;family   Communicated ELVIA with patient/caregiver Yes   Reason For Admission infected hip --   People in Home spouse   Do you expect to return to your current living situation? Yes   Do you have help at home or someone to help you manage your care at home? Yes   Who are your caregiver(s) and their phone number(s)? wife - Marissa [phone]   Prior to hospitilization cognitive status: Alert/Oriented   Current cognitive status: Alert/Oriented   Walking or Climbing Stairs Difficulty yes   Walking or Climbing Stairs ambulation difficulty, requires equipment   Dressing/Bathing Difficulty yes   Dressing/Bathing bathing difficulty, requires equipment   Equipment Currently Used at Home walker, rolling;cane, straight;raised toilet   Readmission within 30 days? No   Patient currently being followed by outpatient case management? No   Do you currently have service(s) that help you manage your care at home? No   Do you take prescription medications? Yes   Do you have prescription coverage? Yes   Do you have any problems affording any of your prescribed medications? No   Is the patient taking medications as prescribed? yes   Who is going to help you get home at discharge? wife   How do you get to doctors appointments? car, drives self   Are you on dialysis? No   Do you take coumadin? No   Discharge Plan A Home with family;Home Health   Discharge Plan B Skilled Nursing Facility   DME Needed Upon Discharge  walker, rolling;raised toilet   Discharge Plan discussed with: Spouse/sig other;Patient   Transition of Care Barriers None     S/p Rev Hip r/t infected hip. Hx infected hip. Provena maintained. Spk w pt & wifeMarissa @ bedside -- std he was treated for hip infection in 2018/2019 under Dr VELAZCO.  std he went to [facility] infusion for abx treatment. Pt has RW, cane, & elev toilet. No current hh. Discuss dcp options --  delaney RAMOS final recommendations.     Contact # Kjrmi [phone].     Pcp: Dr Kelly Mcwilliams

## 2024-04-09 NOTE — PLAN OF CARE
Problem: Adult Inpatient Plan of Care  Goal: Plan of Care Review  Outcome: Ongoing, Progressing  Goal: Patient-Specific Goal (Individualized)  Outcome: Ongoing, Progressing  Goal: Absence of Hospital-Acquired Illness or Injury  Outcome: Ongoing, Progressing  Goal: Optimal Comfort and Wellbeing  Outcome: Ongoing, Progressing  Goal: Readiness for Transition of Care  Outcome: Ongoing, Progressing     Problem: Diabetes Comorbidity  Goal: Blood Glucose Level Within Targeted Range  Outcome: Ongoing, Progressing     Problem: Bariatric Environmental Safety  Goal: Safety Maintained with Care  Outcome: Ongoing, Progressing     Problem: Infection  Goal: Absence of Infection Signs and Symptoms  Outcome: Ongoing, Progressing     Problem: Hypertension Comorbidity  Goal: Blood Pressure in Desired Range  Outcome: Ongoing, Progressing     Problem: Obstructive Sleep Apnea Risk or Actual Comorbidity Management  Goal: Unobstructed Breathing During Sleep  Outcome: Ongoing, Progressing     Problem: Fall Injury Risk  Goal: Absence of Fall and Fall-Related Injury  Outcome: Ongoing, Progressing

## 2024-04-09 NOTE — PT/OT/SLP PROGRESS
Physical Therapy Treatment    Patient Name:  Chris Mendoza II   MRN:  [id number]    Recommendations:     Discharge Recommendations: Low Intensity Therapy  Discharge Equipment Recommendations: walker, rolling  Barriers to discharge:  impaired mobility    Assessment:     Chris Mendoza II is a 78 y.o. male admitted with a medical diagnosis of Failed total hip arthroplasty, initial encounter.  He presents with the following impairments/functional limitations: weakness, impaired endurance, impaired functional mobility, decreased lower extremity function, pain, decreased ROM, edema, orthopedic precautions .    Rehab Prognosis: Good; patient would benefit from acute skilled PT services to address these deficits and reach maximum level of function.    Recent Surgery: Procedure(s) (LRB):  REVISION, TOTAL ARTHROPLASTY, HIP - revision R COLLIN with abx spacer (Right) 1 Day Post-Op    Plan:     During this hospitalization, patient to be seen BID to address the identified rehab impairments via gait training, therapeutic activities, therapeutic exercises and progress toward the following goals:    Plan of Care Expires:  04/15/24    Subjective     Chief Complaint: tired   Patient/Family Comments/goals: get in bed   Pain/Comfort:         Objective:     Communicated with rn prior to session.  Patient found up in chair with   upon PT entry to room.     General Precautions: Standard, fall  Orthopedic Precautions: RLE posterior precautions, RLE partial weight bearing  Braces:    Respiratory Status: Room air     Functional Mobility:  Bed Mobility:     Sit to Supine: moderate assistance  Transfers:     Sit to Stand:  contact guard assistance with rolling walker  Bed to Chair: contact guard assistance with  rolling walker  using  Step Transfer      Treatment & Education:  Pt preformed seated ex 10x in chair. Pt requested to get back in bed due to fatigue from previous therapy sessions.     Patient left supine with all lines intact and call  button in reach..    GOALS:   Multidisciplinary Problems       Physical Therapy Goals          Problem: Physical Therapy    Goal Priority Disciplines Outcome Goal Variances Interventions   Physical Therapy Goal     PT, PT/OT Ongoing, Progressing     Description: Pt will improve functional independence by performing:    Bed mobility: SBA  Sit to stand: SBA with rolling walker  Bed to chair t/f: SBA with Stand Step  with rolling walker  Ambulation x 200'  with SBA with rolling walker  1 Step (Curb): Min A  with rolling walker  3 Steps: Min A  with B HR  Independent with total hip HEP                        Time Tracking:     PT Received On:    PT Start Time: 1356     PT Stop Time: 1414  PT Total Time (min): 18 min     Billable Minutes: Therapeutic Activity 18    Treatment Type: Treatment  PT/PTA: PTA     Number of PTA visits since last PT visit: 1 04/09/2024

## 2024-04-09 NOTE — PT/OT/SLP EVAL
Occupational Therapy   Evaluation    Name: Chris Mendoza II  MRN: [id number]  Admitting Diagnosis: Failed total hip arthroplasty, initial encounter  Recent Surgery: Procedure(s) (LRB):  REVISION, TOTAL ARTHROPLASTY, HIP - revision R COLLIN with abx spacer (Right) 1 Day Post-Op    Recommendations:     Discharge Recommendations: Low Intensity Therapy  Discharge Equipment Recommendations:  walker, rolling  Barriers to discharge:  None    Assessment:     Chris Mendoza II is a 78 y.o. male with a medical diagnosis of Failed total hip arthroplasty, initial encounter.  Performance deficits affecting function: weakness, impaired endurance, impaired self care skills, impaired functional mobility, gait instability, impaired balance, decreased lower extremity function, pain, decreased ROM, orthopedic precautions, impaired joint extensibility, impaired muscle length.      Rehab Prognosis: Good; patient would benefit from acute skilled OT services to address these deficits and reach maximum level of function.       Plan:     Patient to be seen daily (BID) to address the above listed problems via self-care/home management, therapeutic activities, therapeutic exercises  Plan of Care Expires: 04/15/24  Plan of Care Reviewed with: patient, spouse    Subjective     Chief Complaint: fatigue and soreness in hip from PT session  Patient/Family Comments/goals: to go home    Occupational Profile:  Living Environment: lives in [place] with no NANCY. Has WIS, elevated toilet seat with grab bars nearby.   Previous level of function: min assist prior to Jan 2024, but has required an increased amount of assistance from wife (with LB ADLs, bathing, transfers, FM)  Roles and Routines: retired  Equipment Used at Home: walker, rolling, cane, straight  Assistance upon Discharge: wife    Pain/Comfort:  Pain Rating 1: 6/10  Location - Side 1: Right  Location - Orientation 1: generalized  Location 1: hip  Pain Addressed 1: Distraction, Reposition    Patients  "cultural, spiritual, Religion conflicts given the current situation: no    Objective:     Communicated with: NSG and PT prior to session.  Patient found up in chair with philippe catheter, peripheral IV, wound vac upon OT entry to room.    General Precautions: Standard, fall  Orthopedic Precautions: RLE partial weight bearing, RLE posterior precautions  Braces: N/A  Respiratory Status: Room air    Occupational Performance:    Functional Mobility/Transfers:  Patient completed Sit <> Stand Transfer with contact guard assistance  with  rolling walker   Functional Mobility: Patient performed FM in hallway with RW and CGA, tolerating ~120 feet. No rest breaks needed or LOBs noted.    Activities of Daily Living:  Lower Body Dressing: minimum assistance and required mod verbal encouragement to participate in doffing and donning socks; stated "I don't need to do this, my wife will be doing this for me once I go home." His wife encouraged him to try, he agreed. Able to return demo use of reacher and sock aid.    Cognitive/Visual Perceptual:  Cognitive/Psychosocial Skills:     -       Oriented to: Person, Place, Time, and Situation   -       Follows Commands/attention:Follows multistep  commands  -       Communication: clear/fluent  -       Memory: No Deficits noted  -       Safety awareness/insight to disability: intact   -       Mood/Affect/Coping skills/emotional control: Appropriate to situation, Cooperative, Agitated, and Guarded    Physical Exam:  Upper Extremity Range of Motion:     -       Right Upper Extremity: WFL  -       Left Upper Extremity: WFL  Upper Extremity Strength:    -       Right Upper Extremity: WFL  -       Left Upper Extremity: WFL    Treatment & Education:  Reviewed weight bearing status and posterior precautions with patient    Patient left up in chair with all lines intact, call button in reach, and wife present    GOALS:   Multidisciplinary Problems       Occupational Therapy Goals          Problem: " Occupational Therapy    Goal Priority Disciplines Outcome Interventions   Occupational Therapy Goal     OT, PT/OT Ongoing, Progressing    Description: Pt will perform LB dressing mod A with AE PRN by d/c.  Pt will perform toileting min A by d/c.  Pt will perform toilet t/f min A by d/c.  Pt will perform shower t/f min assist by d/c.  Pt will perform car t/f min assist in adherence to pxns by d/c.                        History:     Past Medical History:   Diagnosis Date    Arthritis     Atrial fibrillation     Bladder cancer     CKD (chronic kidney disease)     Diabetes mellitus, type 2     Gout, unspecified     Hyperlipidemia     Hypertension     Restless leg syndrome     Seasonal allergies     Sleep apnea, unspecified     Thyroid disease          Past Surgical History:   Procedure Laterality Date    ARTHROGRAM Right 02/15/2024    Procedure: ARTHROGRAM  aspiration right hip with anesthesia #1;  Surgeon: Jose Luna MD;  Location: [facility];  Service: Orthopedics;  Laterality: Right;  aspiration right hip with anesthesia    BLADDER SURGERY  2009    removed Ca    CARDIAC CATHETERIZATION      CARDIOVERSION      CATARACT EXTRACTION W/  INTRAOCULAR LENS IMPLANT Bilateral     JOINT REPLACEMENT Right     Total Hip    MULTIPLE TOOTH EXTRACTIONS      VEIN SURGERY         Time Tracking:     OT Date of Treatment: 04/09/24  OT Start Time: 0927  OT Stop Time: 0957  OT Total Time (min): 30 min    Billable Minutes:Evaluation 30    4/9/2024

## 2024-04-09 NOTE — PT/OT/SLP PROGRESS
Occupational Therapy   Treatment    Name: Chris Mendoza II  MRN: [id number]  Admitting Diagnosis:  Failed total hip arthroplasty, initial encounter  1 Day Post-Op    Recommendations:     Discharge Recommendations: Low Intensity Therapy  Discharge Equipment Recommendations:  walker, rolling  Barriers to discharge:  None    Assessment:     Chris Mendoza II is a 78 y.o. male with a medical diagnosis of Failed total hip arthroplasty, initial encounter.  Performance deficits affecting function are weakness, impaired endurance, impaired self care skills, impaired functional mobility, gait instability, impaired balance, decreased lower extremity function, decreased safety awareness, pain, decreased ROM, orthopedic precautions, impaired muscle length, impaired joint extensibility.     Rehab Prognosis:  Good; patient would benefit from acute skilled OT services to address these deficits and reach maximum level of function.       Plan:     Patient to be seen daily (BID) to address the above listed problems via self-care/home management, therapeutic activities, therapeutic exercises  Plan of Care Expires: 04/15/24  Plan of Care Reviewed with: patient, spouse    Subjective     Chief Complaint: pain in back from being in the chair  Patient/Family Comments/goals: to go home  Pain/Comfort:  Pain Rating 1: 9/10  Location - Side 1: Bilateral  Location - Orientation 1: lower  Location 1: back  Pain Addressed 1: Distraction, Reposition    Objective:     Communicated with: NSG prior to session.  Patient found up in chair with philippe catheter, peripheral IV, wound vac upon OT entry to room.    General Precautions: Standard, fall    Orthopedic Precautions:RLE posterior precautions, RLE partial weight bearing  Braces: N/A  Respiratory Status: Room air     Occupational Performance:     Functional Mobility/Transfers:  Patient completed Sit <> Stand Transfer with contact guard assistance  with  rolling walker   Patient completed  Shower  Transfer Step Transfer technique with contact guard assistance with rolling walker and grab bars  Patient attempted car transfer step transfer technique with mod A with rolling walker. Unsuccessful due to patient not scooting back enough on seat to safely bring legs into car simulator. Will re-attempt tomorrow  Functional Mobility: Patient performed FM in hallway with RW and SBA, tolerating ~215 feet. No rest breaks needed or LOBs.    Patient requesting to get back into bed due to back pain, OT notified PTA as pt requested to have his treatment done to get into bed.    Patient left up in chair with all lines intact, call button in reach, PTA notified, and wife present    GOALS:   Multidisciplinary Problems       Occupational Therapy Goals          Problem: Occupational Therapy    Goal Priority Disciplines Outcome Interventions   Occupational Therapy Goal     OT, PT/OT Ongoing, Progressing    Description: Pt will perform LB dressing mod A with AE PRN by d/c.  Pt will perform toileting min A by d/c.  Pt will perform toilet t/f min A by d/c.  Pt will perform shower t/f min assist by d/c. MET  Pt will perform car t/f min assist in adherence to pxns by d/c.                        Time Tracking:     OT Date of Treatment: 04/09/24  OT Start Time: 1320  OT Stop Time: 1343  OT Total Time (min): 23 min    Billable Minutes:Self Care/Home Management 23    OT/CHARLIE: OT          4/9/2024

## 2024-04-09 NOTE — CONSULTS
Hospital Medicine Consultation Note        Patient Name: Chris Mendoza II  MRN: [id number]  Patient Class: IP- Inpatient   Admission Date: 4/8/2024  8:47 AM  Length of Stay: 1  Attending Physician: [email or web address]   Primary Care Provider: Shobha Mcwilliams FNP  Face-to-Face encounter date: 04/09/2024 g  Consulting Physician: Moises Acuña MD  Reason for Consult: medical management  Chief Complaint: No chief complaint on file.        Patient information was obtained from patient, patient's family, past medical records.    HISTORY OF PRESENT ILLNESS:   Chris Mendoza II is a 78 y.o. male with  has a past medical history of Arthritis, Atrial fibrillation, Bladder cancer, CKD (chronic kidney disease), Diabetes mellitus, type 2, Gout, unspecified, Hyperlipidemia, Hypertension, Restless leg syndrome, Seasonal allergies, Sleep apnea, unspecified, and Thyroid disease..admitted under Dr. Luna on 4/8/2024 with the diagnosis of right hip infection/revision COLLIN 4/8.  Hospital Medicine team was consulted for medical management   pt resting in bed, ambulated with therapy x3 today, feels good, pain controlled.  Urine is a little concentrated, no swelling trace normal for pt, urine output borderline low currently on ivfs 75cc/h.  No f/c    PAST MEDICAL HISTORY:     Past Medical History:   Diagnosis Date    Arthritis     Atrial fibrillation     Bladder cancer     CKD (chronic kidney disease)     Diabetes mellitus, type 2     Gout, unspecified     Hyperlipidemia     Hypertension     Restless leg syndrome     Seasonal allergies     Sleep apnea, unspecified     Thyroid disease        PAST SURGICAL HISTORY:     Past Surgical History:   Procedure Laterality Date    ARTHROGRAM Right 02/15/2024    Procedure: ARTHROGRAM  aspiration right hip with anesthesia #1;  Surgeon: Jose Luna MD;  Location: [facility];  Service: Orthopedics;  Laterality: Right;  aspiration right hip with anesthesia    BLADDER SURGERY  2009    removed Ca     CARDIAC CATHETERIZATION      CARDIOVERSION      CATARACT EXTRACTION W/  INTRAOCULAR LENS IMPLANT Bilateral     JOINT REPLACEMENT Right     Total Hip    MULTIPLE TOOTH EXTRACTIONS      VEIN SURGERY         ALLERGIES:   Clindamycin and Levofloxacin    FAMILY HISTORY:     Family History   Problem Relation Age of Onset    Coronary artery disease Mother     Cancer Father     Heart disease Father     Thyroid disease Sister         SOCIAL HISTORY:     Social History     Tobacco Use    Smoking status: Never    Smokeless tobacco: Never   Substance Use Topics    Alcohol use: Not Currently        HOME MEDICATIONS:     Prior to Admission medications    Medication Sig Start Date End Date Taking? Authorizing Provider   allopurinoL (ZYLOPRIM) 300 MG tablet Take 1 tablet by mouth twice daily  Patient taking differently: Take 150 mg by mouth Daily. 11/27/23  Yes Shobha Mcwilliams FNP   doxazosin (CARDURA) 4 MG tablet Take 1 tablet by mouth once daily  Patient taking differently: Take 4 mg by mouth once daily. 10/16/23  Yes Shobha Mcwilliams FNP   doxycycline (VIBRA-TABS) 100 MG tablet Take 1 tablet (100 mg total) by mouth 2 (two) times daily. 2/27/24 5/27/24 Yes Jose Luna MD   doxycycline (VIBRAMYCIN) 100 MG Cap Take 1 capsule (100 mg total) by mouth every 12 (twelve) hours. 3/26/24 4/25/24 Yes Little Contreras FNP   ELIQUIS 5 mg Tab Take 5 mg by mouth 2 (two) times daily. 3/7/22  Yes Esperanza, Tanika   finasteride (PROSCAR) 5 mg tablet Take 1 tablet by mouth once daily  Patient taking differently: Take 5 mg by mouth once daily. 1/22/24  Yes Shobha Mcwilliams FNP   glipiZIDE (GLUCOTROL) 10 MG TR24 Take 1 tablet (10 mg total) by mouth daily with breakfast. 3/16/23 3/26/24 Yes Shobha Mcwilliams FNP   HYDROcodone-acetaminophen (NORCO) 5-325 mg per tablet Take 1 tablet by mouth every 6 (six) hours as needed for Pain.  Patient not taking: Reported on 3/26/2024 1/25/24  Yes Jose Luna MD    pioglitazone (ACTOS) 30 MG tablet Take 1 tablet (30 mg total) by mouth once daily. 9/21/23 9/20/24 Yes Shobha Mcwilliams, CHARO   rosuvastatin (CRESTOR) 20 MG tablet Take 20 mg by mouth every evening. 3/7/22  Yes Provider, Historical   semaglutide (OZEMPIC) 0.25 mg or 0.5 mg(2 mg/1.5 mL) pen injector Inject into the skin every 7 days.   Yes Provider, Historical   HYDROcodone-acetaminophen (NORCO) 5-325 mg per tablet Take 1 tablet by mouth every 6 (six) hours as needed for Pain.  Patient not taking: Reported on 3/26/2024 2/15/24   Jose Luna MD   HYDROcodone-acetaminophen (NORCO) 5-325 mg per tablet Take 1 tablet by mouth every 6 (six) hours as needed for Pain. 2/27/24   Jose Luna MD   loratadine (CLARITIN) 10 mg tablet Take 10 mg by mouth daily as needed.    Provider, Historical       REVIEW OF SYSTEMS:   Review of Systems   All other systems reviewed and are negative.       PHYSICAL EXAM:   BP (!) 90/55   Pulse 88   Temp 98 °F (36.7 °C) (Oral)   Resp 17   Ht 6' (1.829 m)   Wt (!) 149.2 kg (329 lb)   SpO2 (!) 92%   BMI 44.62 kg/m²     Physical Exam  Constitutional:       Appearance: He is obese.   Eyes:      Extraocular Movements: Extraocular movements intact.      Pupils: Pupils are equal, round, and reactive to light.   Cardiovascular:      Rate and Rhythm: Normal rate and regular rhythm.      Heart sounds: Normal heart sounds.   Pulmonary:      Effort: Pulmonary effort is normal.      Breath sounds: Normal breath sounds. No wheezing, rhonchi or rales.   Abdominal:      General: Bowel sounds are normal.      Palpations: Abdomen is soft.   Musculoskeletal:      Cervical back: Neck supple.      Right lower leg: Edema (trace) present.      Left lower leg: Edema (trace) present.      Comments: Right hip COLLIN   Skin:     Findings: No rash.   Neurological:      General: No focal deficit present.      Mental Status: He is alert.   Psychiatric:         Mood and Affect: Mood normal.         Thought  Content: Thought content normal.          LABS AND IMAGING:     Admission on 04/08/2024   Component Date Value Ref Range Status    GRAM STAIN 04/08/2024 No WBCs, No bacteria seen   Final    GRAM STAIN 04/08/2024 No WBCs, No bacteria seen   Final    GRAM STAIN 04/08/2024 Rare WBC observed   Final    GRAM STAIN 04/08/2024 No bacteria seen   Final    CULTURE, TISSUE- AEROBIC ([facility]) 04/08/2024 No Growth At 24 Hours   Preliminary    CULTURE, TISSUE- AEROBIC ([facility]) 04/08/2024 No Growth At 24 Hours   Preliminary    CULTURE, TISSUE- AEROBIC ([facility]) 04/08/2024 No Growth At 24 Hours   Preliminary    POCT Glucose 04/08/2024 96  70 - 110 mg/dL Final    Hgb 04/08/2024 11.1 (L)  14.0 - 18.0 g/dL Final    Hct 04/08/2024 35.2 (L)  42.0 - 52.0 % Final    POCT Glucose 04/08/2024 140 (H)  70 - 110 mg/dL Final    POCT Glucose 04/08/2024 107  70 - 110 mg/dL Final    Sodium Level 04/09/2024 138  136 - 145 mmol/L Final    Potassium Level 04/09/2024 4.1  3.5 - 5.1 mmol/L Final    Chloride 04/09/2024 108 (H)  98 - 107 mmol/L Final    Carbon Dioxide 04/09/2024 21 (L)  23 - 31 mmol/L Final    Glucose Level 04/09/2024 85  82 - 115 mg/dL Final    Blood Urea Nitrogen 04/09/2024 15.5  8.4 - 25.7 mg/dL Final    Creatinine 04/09/2024 1.22 (H)  0.73 - 1.18 mg/dL Final    BUN/Creatinine Ratio 04/09/2024 13   Final    Calcium Level Total 04/09/2024 8.7 (L)  8.8 - 10.0 mg/dL Final    Anion Gap 04/09/2024 9.0  mEq/L Final    eGFR 04/09/2024 >60  mls/min/1.73/m2 Final    WBC 04/09/2024 6.34  4.50 - 11.50 x10(3)/mcL Final    RBC 04/09/2024 3.57 (L)  4.70 - 6.10 x10(6)/mcL Final    Hgb 04/09/2024 10.1 (L)  14.0 - 18.0 g/dL Final    Hct 04/09/2024 32.0 (L)  42.0 - 52.0 % Final    MCV 04/09/2024 89.6  80.0 - 94.0 fL Final    MCH 04/09/2024 28.3  27.0 - 31.0 pg Final    MCHC 04/09/2024 31.6 (L)  33.0 - 36.0 g/dL Final    RDW 04/09/2024 15.9  11.5 - 17.0 % Final    Platelet 04/09/2024 147  130 - 400 x10(3)/mcL Final    MPV 04/09/2024 10.8 (H)  7.4 - 10.4 fL Final     NRBC% 04/09/2024 0.0  % Final        X-Ray Hip 1 View Right (with Pelvis when performed)    Result Date: 4/8/2024  EXAMINATION: XR HIP WITH PELVIS WHEN PERFORMED, 1 VIEW RIGHT CLINICAL HISTORY: Post-op; Broken internal joint prosthesis, other site, initial encounter TECHNIQUE: Frontal and frogleg lateral views of the right hip COMPARISON: Radiography 01/25/2024 FINDINGS: Antibiotic beads adjacent to the proximal right femur.  Aligned right hip.     Hip arthroplasty revision with antibiotic bead placement. Electronically signed by: Yunier Whiting Date:    04/08/2024 Time:    16:50    X-Ray Chest PA And Lateral    Result Date: 3/26/2024  EXAMINATION: XR CHEST PA AND LATERAL CLINICAL HISTORY: , Broken internal joint prosthesis, other site, initial encounter. COMPARISON: June 5, 2020 FINDINGS: No alveolar consolidation, effusion, or pneumothorax is seen.   The thoracic aorta is normal  cardiac silhouette, central pulmonary vessels and mediastinum are normal in size and are grossly unremarkable.   visualized osseous structures are grossly unremarkable.     No acute chest disease is identified. Electronically signed by: Oren Carney Date:    03/26/2024 Time:    10:39       Microbiology Results (last 7 days)       Procedure Component Value Units Date/Time    Blood Culture [[id number]] Collected: 04/09/24 1300    Order Status: Sent Specimen: Blood from Antecubital, Right Updated: 04/09/24 1312    Blood Culture [[id number]] Collected: 04/09/24 1311    Order Status: Sent Specimen: Blood from Hand, Right Updated: 04/09/24 1312    Gram Stain [[id number]] Collected: 04/08/24 1411    Order Status: Completed Specimen: Tissue from Hip, Right Updated: 04/09/24 0820     GRAM STAIN No WBCs, No bacteria seen    Gram Stain [[id number]] Collected: 04/08/24 1412    Order Status: Completed Specimen: Tissue from Hip, Right Updated: 04/09/24 0819     GRAM STAIN No WBCs, No bacteria seen    Gram Stain [[id number]] Collected: 04/08/24  1412    Order Status: Completed Specimen: Tissue from Hip, Right Updated: 04/09/24 0819     GRAM STAIN Rare WBC observed      No bacteria seen    Tissue Culture - Aerobic [[id number]] Collected: 04/08/24 1412    Order Status: Completed Specimen: Tissue from Hip, Right Updated: 04/09/24 0639     CULTURE, TISSUE- AEROBIC ([facility]) No Growth At 24 Hours    Tissue Culture - Aerobic [[id number]] Collected: 04/08/24 1411    Order Status: Completed Specimen: Tissue from Hip, Right Updated: 04/09/24 0638     CULTURE, TISSUE- AEROBIC ([facility]) No Growth At 24 Hours    Tissue Culture - Aerobic [[id number]] Collected: 04/08/24 1412    Order Status: Completed Specimen: Tissue from Hip, Right Updated: 04/09/24 0637     CULTURE, TISSUE- AEROBIC ([facility]) No Growth At 24 Hours    Anaerobic Culture [[id number]] Collected: 04/08/24 1411    Order Status: Sent Specimen: Tissue from Hip, Right Updated: 04/08/24 1833    Anaerobic Culture [[id number]] Collected: 04/08/24 1412    Order Status: Sent Specimen: Tissue from Hip, Right Updated: 04/08/24 1833    Anaerobic Culture [[id number]] Collected: 04/08/24 1412    Order Status: Sent Specimen: Tissue from Hip, Right Updated: 04/08/24 1832             ASSESSMENT & PLAN:   Revision right COLLIN 4/8, suspected infection  Hx afib, htn, hld, dm, ckd    Plan    Vancomycin  F/u cxs  Consult ID  Refer to ortho recs  Pain control  PTOT  Cont ivfs/labs in am  Home meds/iss  Pt says he was on a fluid pill but stopped b/c on ozempic, consider resuming if swelling    DVT: Prophylaxis:Eliquis  GI Prophylaxis:  Pepcid    __________________________________________________________________________  INPATIENT LIST OF MEDICATIONS     Current Facility-Administered Medications:     0.9%  NaCl infusion, , Intravenous, Continuous, Jose Luna MD, Last Rate: 75 mL/hr at 04/08/24 1703, New Bag at 04/08/24 1703    acetaminophen tablet 500 mg, 500 mg, Oral, Q4H, Latha Steve, FNP    aluminum-magnesium hydroxide-simethicone  200-200-20 mg/5 mL suspension 30 mL, 30 mL, Oral, Q6H PRN, Jose Luna MD    apixaban tablet 5 mg, 5 mg, Oral, BID, Jose Luna MD, 5 mg at 04/09/24 0823    [START ON 4/11/2024] bisacodyL suppository 10 mg, 10 mg, Rectal, Daily, Jose Luna MD    dextrose 10% bolus 125 mL 125 mL, 12.5 g, Intravenous, PRN, Jose Luna MD    dextrose 10% bolus 250 mL 250 mL, 25 g, Intravenous, PRN, Jose Luna MD    docusate sodium capsule 200 mg, 200 mg, Oral, Daily, Jose Luna MD, 200 mg at 04/09/24 0537    doxazosin tablet 4 mg, 4 mg, Oral, Daily, Jose Luna MD, 4 mg at 04/09/24 0824    famotidine tablet 20 mg, 20 mg, Oral, BID, Jose Luna MD, 20 mg at 04/09/24 0824    finasteride tablet 5 mg, 5 mg, Oral, Daily, Jose Luna MD, 5 mg at 04/09/24 0823    gabapentin capsule 300 mg, 300 mg, Oral, QHS, Jose Luna MD, 300 mg at 04/08/24 2051    glucagon (human recombinant) injection 1 mg, 1 mg, Intramuscular, PRN, Jose Luna MD    glucose chewable tablet 16 g, 16 g, Oral, PRN, Jose Luna MD    glucose chewable tablet 24 g, 24 g, Oral, PRN, Jose Luna MD    insulin aspart U-100 injection 1-10 Units, 1-10 Units, Subcutaneous, QID (AC + HS) PRN, Jose Luna MD    lactulose 20 gram/30 mL solution Soln 20 g, 20 g, Oral, Q6H PRN, Jose Luna MD    melatonin tablet 6 mg, 6 mg, Oral, Nightly PRN, Jose Luna MD    methocarbamoL tablet 750 mg, 750 mg, Oral, Q8H PRN, Jose Luna MD    metoclopramide injection 10 mg, 10 mg, Intravenous, Q6H, Jose Luna MD, 10 mg at 04/09/24 1406    ondansetron injection 4 mg, 4 mg, Intravenous, Q6H PRN, Jose Luna MD    pioglitazone tablet 30 mg, 30 mg, Oral, Daily, Jose Luna MD, 30 mg at 04/09/24 0824    polyethylene glycol packet 17 g, 17 g, Oral, QHS, Jose Luna MD    senna-docusate 8.6-50 mg per tablet 2 tablet, 2 tablet, Oral, BID, Jose Luna MD, 2 tablet at 04/09/24  0823    traMADoL tablet 50 mg, 50 mg, Oral, Q4H PRN, Latha Steve FNP    [COMPLETED] vancomycin (VANCOCIN) 1,000 mg in dextrose 5 % (D5W) 250 mL IVPB (Vial-Mate), 1,000 mg, Intravenous, Once, Paused at 04/09/24 1434 **AND** vancomycin (VANCOCIN) 1,000 mg in dextrose 5 % (D5W) 250 mL IVPB (Vial-Mate), 1,000 mg, Intravenous, Once, Humberto Jackson MD, Stopped at 04/09/24 1759    [START ON 4/10/2024] vancomycin (VANCOCIN) 1,000 mg in dextrose 5 % (D5W) 250 mL IVPB (Vial-Mate), 1,000 mg, Intravenous, Q12H, Humberto Jackson MD    Pharmacy to dose Vancomycin consult, , , Once **AND** vancomycin - pharmacy to dose, , Intravenous, pharmacy to manage frequency, Humberto Jackson MD    Facility-Administered Medications Ordered in Other Encounters:     lactated ringers infusion, , Intravenous, Continuous, Jana Rios MD      Scheduled Meds:   acetaminophen  500 mg Oral Q4H    apixaban  5 mg Oral BID    [START ON 4/11/2024] bisacodyL  10 mg Rectal Daily    docusate sodium  200 mg Oral Daily    doxazosin  4 mg Oral Daily    famotidine  20 mg Oral BID    finasteride  5 mg Oral Daily    gabapentin  300 mg Oral QHS    metoclopramide  10 mg Intravenous Q6H    pioglitazone  30 mg Oral Daily    polyethylene glycol  17 g Oral QHS    senna-docusate 8.6-50 mg  2 tablet Oral BID    vancomycin (VANCOCIN) IV (PEDS and ADULTS)  1,000 mg Intravenous Once    [START ON 4/10/2024] vancomycin (VANCOCIN) IV (PEDS and ADULTS)  1,000 mg Intravenous Q12H     Continuous Infusions:   sodium chloride 0.9% 75 mL/hr at 04/08/24 1703     PRN Meds:.aluminum-magnesium hydroxide-simethicone, dextrose 10%, dextrose 10%, glucagon (human recombinant), glucose, glucose, insulin aspart U-100, lactulose, melatonin, methocarbamoL, ondansetron, traMADoL, Pharmacy to dose Vancomycin consult **AND** vancomycin - pharmacy to dose    ______________________________________________________________________________    Thank you for the consult, will be happy to follow alongside  you      All diagnosis and differential diagnosis have been reviewed; assessment and plan has been documented; I have personally reviewed the labs and test results that are presently available; I have reviewed the patients medication list; I have reviewed the consulting providers response and recommendations. I have reviewed or attempted to review medical records based upon their availability.    All of the patient and family questions have been addressed and answered. Patient's is agreeable to the above stated plan. I will continue to monitor closely and make adjustments to medical management as needed.    Moises Acuña MD   04/09/2024

## 2024-04-09 NOTE — PLAN OF CARE
Problem: Occupational Therapy  Goal: Occupational Therapy Goal  Description: Pt will perform LB dressing mod A with AE PRN by d/c.  Pt will perform toileting min A by d/c.  Pt will perform toilet t/f min A by d/c.  Pt will perform shower t/f min assist by d/c.  Pt will perform car t/f min assist in adherence to pxns by d/c.   Outcome: Ongoing, Progressing

## 2024-04-09 NOTE — PROGRESS NOTES
"Pharmacokinetic Initial Assessment: IV Vancomycin    Assessment/Plan:  Initiate intravenous vancomycin with loading dose of 2000 mg once followed by a maintenance dose of vancomycin 1000 mg IV every 12 hours  Desired empiric serum trough concentration is 15 to 20 mcg/mL  Draw vancomycin trough level 60 min prior to third dose on 04/10 at approximately 1300  Pharmacy will continue to follow and monitor vancomycin.      Please contact pharmacy at extension [phone] with any questions regarding this assessment.     Thank you for the consult,   Kenia Slater       Patient brief summary:  Chris Mendoza II is a 78 y.o. male initiated on antimicrobial therapy with IV Vancomycin for treatment of suspected bone/joint infection    Drug Allergies:   Review of patient's allergies indicates:   Allergen Reactions    Clindamycin Rash    Levofloxacin Hives       Actual Body Weight:   Wt Readings from Last 1 Encounters:   04/08/24 103.9 kg (229 lb)       Renal Function:   Estimated Creatinine Clearance: 63.2 mL/min (A) (based on SCr of 1.22 mg/dL (H)).,     Dialysis Method (if applicable):  N/A    CBC (last 72 hours):  Recent Labs   Lab Result Units 04/08/24  1637 04/09/24  0432   WBC x10(3)/mcL  --  6.34   Hgb g/dL 11.1* 10.1*   Hct % 35.2* 32.0*   Platelet x10(3)/mcL  --  147       Metabolic Panel (last 72 hours):  Recent Labs   Lab Result Units 04/09/24  0432   Sodium Level mmol/L 138   Potassium Level mmol/L 4.1   Chloride mmol/L 108*   Carbon Dioxide mmol/L 21*   Glucose Level mg/dL 85   Blood Urea Nitrogen mg/dL 15.5   Creatinine mg/dL 1.22*       Drug levels (last 3 results):  No results for input(s): "VANCOMYCINRA", "VANCORANDOM", "VANCOMYCINPE", "VANCOPEAK", "VANCOMYCINTR", "VANCOTROUGH" in the last 72 hours.    Microbiologic Results:  Microbiology Results (last 7 days)       Procedure Component Value Units Date/Time    Blood Culture [[id number]]     Order Status: Sent Specimen: Blood     Blood Culture [[id number]]     Order " Status: Sent Specimen: Blood     Gram Stain [[id number]] Collected: 04/08/24 1411    Order Status: Completed Specimen: Tissue from Hip, Right Updated: 04/09/24 0820     GRAM STAIN No WBCs, No bacteria seen    Gram Stain [[id number]] Collected: 04/08/24 1412    Order Status: Completed Specimen: Tissue from Hip, Right Updated: 04/09/24 0819     GRAM STAIN No WBCs, No bacteria seen    Gram Stain [[id number]] Collected: 04/08/24 1412    Order Status: Completed Specimen: Tissue from Hip, Right Updated: 04/09/24 0819     GRAM STAIN Rare WBC observed      No bacteria seen    Tissue Culture - Aerobic [[id number]] Collected: 04/08/24 1412    Order Status: Completed Specimen: Tissue from Hip, Right Updated: 04/09/24 0639     CULTURE, TISSUE- AEROBIC ([facility]) No Growth At 24 Hours    Tissue Culture - Aerobic [[id number]] Collected: 04/08/24 1411    Order Status: Completed Specimen: Tissue from Hip, Right Updated: 04/09/24 0638     CULTURE, TISSUE- AEROBIC ([facility]) No Growth At 24 Hours    Tissue Culture - Aerobic [[id number]] Collected: 04/08/24 1412    Order Status: Completed Specimen: Tissue from Hip, Right Updated: 04/09/24 0637     CULTURE, TISSUE- AEROBIC ([facility]) No Growth At 24 Hours    Anaerobic Culture [[id number]] Collected: 04/08/24 1411    Order Status: Sent Specimen: Tissue from Hip, Right Updated: 04/08/24 1833    Anaerobic Culture [[id number]] Collected: 04/08/24 1412    Order Status: Sent Specimen: Tissue from Hip, Right Updated: 04/08/24 1833    Anaerobic Culture [[id number]] Collected: 04/08/24 1412    Order Status: Sent Specimen: Tissue from Hip, Right Updated: 04/08/24 1832

## 2024-04-09 NOTE — PT/OT/SLP EVAL
Physical Therapy Evaluation    Patient Name:  Chris Mendoza II   MRN:  [id number]    Recommendations:     Discharge Recommendations: Low Intensity Therapy   Discharge Equipment Recommendations: walker, rolling   Barriers to discharge: None    Assessment:     Chris Mendoza II is a 78 y.o. male admitted with a medical diagnosis of Failed total hip arthroplasty, initial encounter.  He presents with the following impairments/functional limitations: weakness, impaired endurance, impaired functional mobility, decreased lower extremity function, pain, decreased ROM, edema, orthopedic precautions .    Rehab Prognosis: Good; patient would benefit from acute skilled PT services to address these deficits and reach maximum level of function.    Recent Surgery: Procedure(s) (LRB):  REVISION, TOTAL ARTHROPLASTY, HIP - revision R COLLIN with abx spacer (Right) 1 Day Post-Op    Plan:     During this hospitalization, patient to be seen BID to address the identified rehab impairments via gait training, therapeutic activities, therapeutic exercises and progress toward the following goals:    Plan of Care Expires:  04/15/24    Subjective     Chief Complaint: R hip pain  Patient/Family Comments/goals:   Pain/Comfort:  Location - Side 1: Right  Location 1: hip  Pain Addressed 1: Pre-medicate for activity, Reposition, Distraction, Cessation of Activity    Patients cultural, spiritual, Adventist conflicts given the current situation:      Living Environment:  Pt lives in single story home with wife, no steps to [place].  Prior to admission, patients level of function was mod ind with RW/cane use most of the time, wife assists with some ADLs. He used a wheelchair for long distances in community.  Equipment used at home: walker, rolling, wheelchair, cane, straight.  DME owned (not currently used): rolling walker.  Upon discharge, patient will have assistance from wife.    Objective:     Communicated with nurse prior to session.  Patient found  supine with peripheral IV, philippe catheter, wound vac  upon PT entry to room.    General Precautions: Standard, fall  Orthopedic Precautions:RLE posterior precautions, RLE partial weight bearing   Braces:    Respiratory Status: Room air    Exams:  RLE ROM: NT dt sx side  RLE Strength: NT dt sx side  LLE ROM: WFL  LLE Strength: WFL    Functional Mobility:  Bed Mobility:     Supine to Sit: minimum assistance and of 2 persons  Transfers:     Sit to Stand:  minimum assistance with rolling walker  Gait: Pt ambulated 200 ft w rw and CGA, using step through gait pattern at slow pace.        Treatment & Education:  Pt edu on total hip precautions, partial WB status, and importance of frequent mobility  Pt did not complete prehab prior to sx.  Pt completed COLLIN therex x10 AAROM    Patient left up in chair with all lines intact, call button in reach, nurse notified, and wife present.    GOALS:   Multidisciplinary Problems       Physical Therapy Goals          Problem: Physical Therapy    Goal Priority Disciplines Outcome Goal Variances Interventions   Physical Therapy Goal     PT, PT/OT Ongoing, Progressing     Description: Pt will improve functional independence by performing:    Bed mobility: SBA  Sit to stand: SBA with rolling walker  Bed to chair t/f: SBA with Stand Step  with rolling walker  Ambulation x 200'  with SBA with rolling walker  1 Step (Curb): Min A  with rolling walker  3 Steps: Min A  with B HR  Independent with total hip HEP                        History:     Past Medical History:   Diagnosis Date    Arthritis     Atrial fibrillation     Bladder cancer     CKD (chronic kidney disease)     Diabetes mellitus, type 2     Gout, unspecified     Hyperlipidemia     Hypertension     Restless leg syndrome     Seasonal allergies     Sleep apnea, unspecified     Thyroid disease        Past Surgical History:   Procedure Laterality Date    ARTHROGRAM Right 02/15/2024    Procedure: ARTHROGRAM  aspiration right hip with  anesthesia #1;  Surgeon: Jose Luna MD;  Location: [facility];  Service: Orthopedics;  Laterality: Right;  aspiration right hip with anesthesia    BLADDER SURGERY  2009    removed Ca    CARDIAC CATHETERIZATION      CARDIOVERSION      CATARACT EXTRACTION W/  INTRAOCULAR LENS IMPLANT Bilateral     JOINT REPLACEMENT Right     Total Hip    MULTIPLE TOOTH EXTRACTIONS      VEIN SURGERY         Time Tracking:     PT Received On:    PT Start Time: 0837     PT Stop Time: 0920  PT Total Time (min): 43 min     Billable Minutes: Evaluation 30 and Gait Training 13      04/09/2024

## 2024-04-10 LAB
ANION GAP SERPL CALC-SCNC: 11 MEQ/L
BUN SERPL-MCNC: 20.5 MG/DL (ref 8.4–25.7)
CALCIUM SERPL-MCNC: 8.8 MG/DL (ref 8.8–10)
CHLORIDE SERPL-SCNC: 107 MMOL/L (ref 98–107)
CO2 SERPL-SCNC: 18 MMOL/L (ref 23–31)
CREAT SERPL-MCNC: 1.39 MG/DL (ref 0.73–1.18)
CREAT/UREA NIT SERPL: 15
ERYTHROCYTE [DISTWIDTH] IN BLOOD BY AUTOMATED COUNT: 16.1 % (ref 11.5–17)
GFR SERPLBLD CREATININE-BSD FMLA CKD-EPI: 52 MLS/MIN/1.73/M2
GLUCOSE SERPL-MCNC: 103 MG/DL (ref 82–115)
HCT VFR BLD AUTO: 28.8 % (ref 42–52)
HGB BLD-MCNC: 9.1 G/DL (ref 14–18)
MCH RBC QN AUTO: 28.2 PG (ref 27–31)
MCHC RBC AUTO-ENTMCNC: 31.6 G/DL (ref 33–36)
MCV RBC AUTO: 89.2 FL (ref 80–94)
NRBC BLD AUTO-RTO: 0 %
PLATELET # BLD AUTO: 128 X10(3)/MCL (ref 130–400)
PMV BLD AUTO: 10.8 FL (ref 7.4–10.4)
POCT GLUCOSE: 108 MG/DL (ref 70–110)
POCT GLUCOSE: 133 MG/DL (ref 70–110)
POCT GLUCOSE: 145 MG/DL (ref 70–110)
POTASSIUM SERPL-SCNC: 3.6 MMOL/L (ref 3.5–5.1)
RBC # BLD AUTO: 3.23 X10(6)/MCL (ref 4.7–6.1)
SODIUM SERPL-SCNC: 136 MMOL/L (ref 136–145)
VANCOMYCIN TROUGH SERPL-MCNC: 16.9 UG/ML (ref 15–20)
WBC # SPEC AUTO: 5.61 X10(3)/MCL (ref 4.5–11.5)

## 2024-04-10 PROCEDURE — 99900031 HC PATIENT EDUCATION (STAT)

## 2024-04-10 PROCEDURE — 25000003 PHARM REV CODE 250: Performed by: GENERAL PRACTICE

## 2024-04-10 PROCEDURE — 25000003 PHARM REV CODE 250: Performed by: NURSE PRACTITIONER

## 2024-04-10 PROCEDURE — 11000001 HC ACUTE MED/SURG PRIVATE ROOM

## 2024-04-10 PROCEDURE — 97530 THERAPEUTIC ACTIVITIES: CPT

## 2024-04-10 PROCEDURE — 25000003 PHARM REV CODE 250: Performed by: ORTHOPAEDIC SURGERY

## 2024-04-10 PROCEDURE — 94799 UNLISTED PULMONARY SVC/PX: CPT

## 2024-04-10 PROCEDURE — 63600175 PHARM REV CODE 636 W HCPCS: Performed by: GENERAL PRACTICE

## 2024-04-10 PROCEDURE — 27000221 HC OXYGEN, UP TO 24 HOURS

## 2024-04-10 PROCEDURE — 63600175 PHARM REV CODE 636 W HCPCS: Performed by: ORTHOPAEDIC SURGERY

## 2024-04-10 PROCEDURE — 94761 N-INVAS EAR/PLS OXIMETRY MLT: CPT

## 2024-04-10 PROCEDURE — 85027 COMPLETE CBC AUTOMATED: CPT | Performed by: ORTHOPAEDIC SURGERY

## 2024-04-10 PROCEDURE — 97116 GAIT TRAINING THERAPY: CPT

## 2024-04-10 PROCEDURE — 80048 BASIC METABOLIC PNL TOTAL CA: CPT | Performed by: ORTHOPAEDIC SURGERY

## 2024-04-10 PROCEDURE — 80202 ASSAY OF VANCOMYCIN: CPT | Performed by: GENERAL PRACTICE

## 2024-04-10 RX ADMIN — VANCOMYCIN HYDROCHLORIDE 1000 MG: 1 INJECTION, POWDER, LYOPHILIZED, FOR SOLUTION INTRAVENOUS at 01:04

## 2024-04-10 RX ADMIN — PIOGLITAZONE 30 MG: 15 TABLET ORAL at 08:04

## 2024-04-10 RX ADMIN — ACETAMINOPHEN 500 MG: 500 TABLET ORAL at 03:04

## 2024-04-10 RX ADMIN — SENNOSIDES AND DOCUSATE SODIUM 2 TABLET: 8.6; 5 TABLET ORAL at 08:04

## 2024-04-10 RX ADMIN — ACETAMINOPHEN 500 MG: 500 TABLET ORAL at 08:04

## 2024-04-10 RX ADMIN — FINASTERIDE 5 MG: 5 TABLET, FILM COATED ORAL at 08:04

## 2024-04-10 RX ADMIN — ACETAMINOPHEN 500 MG: 500 TABLET ORAL at 04:04

## 2024-04-10 RX ADMIN — VANCOMYCIN HYDROCHLORIDE 1000 MG: 1 INJECTION, POWDER, LYOPHILIZED, FOR SOLUTION INTRAVENOUS at 03:04

## 2024-04-10 RX ADMIN — FAMOTIDINE 20 MG: 20 TABLET ORAL at 08:04

## 2024-04-10 RX ADMIN — TRAMADOL HYDROCHLORIDE 50 MG: 50 TABLET, COATED ORAL at 08:04

## 2024-04-10 RX ADMIN — METOCLOPRAMIDE 10 MG: 5 INJECTION, SOLUTION INTRAMUSCULAR; INTRAVENOUS at 03:04

## 2024-04-10 RX ADMIN — ACETAMINOPHEN 500 MG: 500 TABLET ORAL at 11:04

## 2024-04-10 RX ADMIN — APIXABAN 5 MG: 5 TABLET, FILM COATED ORAL at 08:04

## 2024-04-10 RX ADMIN — DOXAZOSIN 4 MG: 4 TABLET ORAL at 08:04

## 2024-04-10 RX ADMIN — TRAMADOL HYDROCHLORIDE 50 MG: 50 TABLET, COATED ORAL at 03:04

## 2024-04-10 RX ADMIN — GABAPENTIN 300 MG: 300 CAPSULE ORAL at 08:04

## 2024-04-10 RX ADMIN — METHOCARBAMOL TABLETS 750 MG: 750 TABLET, COATED ORAL at 08:04

## 2024-04-10 RX ADMIN — POLYETHYLENE GLYCOL 3350 17 G: 17 POWDER, FOR SOLUTION ORAL at 08:04

## 2024-04-10 RX ADMIN — DOCUSATE SODIUM 200 MG: 100 CAPSULE, LIQUID FILLED ORAL at 08:04

## 2024-04-10 NOTE — PT/OT/SLP PROGRESS
Occupational Therapy      Patient Name:  Chris Mendoza II   MRN:  [id number]    Patient not seen this morning secondary to patient being unwilling to participate, pt fatigue, and pt pain. OT attempted to see patient twice this morning; during first attempt, pt refused due to fatigue. During second attempt, pt still refusing. OT educated patient on difference between OT and PT, benefits of both kinds of therapy. Still declining OT at this time. Will follow-up in PM.    4/10/2024

## 2024-04-10 NOTE — PLAN OF CARE
Problem: Occupational Therapy  Goal: Occupational Therapy Goal  Description: Pt will perform LB dressing mod A with AE PRN by d/c.  Pt will perform toileting min A by d/c.  Pt will perform toilet t/f min A by d/c.  Pt will perform shower t/f min assist by d/c. MET  Pt will perform car t/f min assist in adherence to pxns by d/c.   Outcome: Ongoing, Not Progressing

## 2024-04-10 NOTE — PROGRESS NOTES
No acute events overnight.  Pain controlled.  Resting in bed. Feeling better today.    Vital Signs  Temp: 97.8 °F (36.6 °C)  Temp Source: Oral  Pulse: 83  Heart Rate Source: Monitor  Resp: 20  SpO2: (!) 94 %  Pulse Oximetry Type: Intermittent  Flow (L/min): 10  Oxygen Concentration (%): 80  Device (Oxygen Therapy): room air  BP: 104/67  BP Location: Left arm  BP Method: Automatic  Patient Position: Lying  Arousal Level: arouses to voice  Height and Weight  Height: 6' (182.9 cm)  Height Method: Stated  Weight: (!) 149.2 kg (329 lb)  Weight Method: Bed Scale  BSA (Calculated - sq m): 2.3 sq meters  BMI (Calculated): 44.6  Weight in (lb) to have BMI = 25: 183.9]    +FHL/EHL  BCR distally  Dressing c/d/i  SILT distally    Recent Lab Results         04/10/24  0528   04/10/24  0505   04/09/24  2049        Anion Gap   11.0         BUN   20.5         BUN/CREAT RATIO   15         Calcium   8.8         Chloride   107         CO2   18         Creatinine   1.39         eGFR   52         Glucose   103         Hematocrit   28.8         Hemoglobin   9.1         MCH   28.2         MCHC   31.6         MCV   89.2         MPV   10.8         nRBC   0.0         Platelet Count   128         POCT Glucose 108     124       Potassium   3.6         RBC   3.23         RDW   16.1         Sodium   136         WBC   5.61                 A/P:  Status post revision COLLIN for infection  Pain controlled  Overall patient doing well.  Therapy for mobility and ambulation.  DVT PPx  F/u ID recs  CKD - stable - continue gentle hydration  DM - Sliding scale, continue home meds

## 2024-04-10 NOTE — PT/OT/SLP PROGRESS
Occupational Therapy      Patient Name:  Chris Mendoza II   MRN:  [id number]    Patient not seen this PM secondary to pt being unwilling to participate and still reporting fatigue from this morning. Will follow-up tomorrow AM.    4/10/2024

## 2024-04-10 NOTE — PROGRESS NOTES
[facility]  Hospital Medicine Progress Note      Patient Name: Chris Mendoza II  MRN: [id number]  Admission Date: 4/8/2024   Length of Stay: 2  Attending Physician: Irwin Art MD  Primary Care Provider: Shobha Mcwilliams FNP  Face-to-Face encounter date: 04/10/2024    Code Status: Prior        Chief Complaint:   No chief complaint on file.        HPI:   Chris Mendoza II is a 78 y.o. male with  has a past medical history of Arthritis, Atrial fibrillation, Bladder cancer, CKD (chronic kidney disease), Diabetes mellitus, type 2, Gout, unspecified, Hyperlipidemia, Hypertension, Restless leg syndrome, Seasonal allergies, Sleep apnea, unspecified, and Thyroid disease..admitted under Dr. Luna on 4/8/2024 with the diagnosis of right hip infection/revision COLLIN 4/8.  Hospital Medicine team was consulted for medical management   pt resting in bed, ambulated with therapy x3 today, feels good, pain controlled.  Urine is a little concentrated, no swelling trace normal for pt, urine output borderline low currently on ivfs 75cc/h.  No f/c     Overview/Hospital Course:  No notes on file       Interval Hx:   The pt has no c/o. Pain controlled. Case d/w CM.    Review of Systems   All other systems reviewed and are negative.      Objective/physical exam:  General: In no acute distress, afebrile  Chest: Clear to auscultation bilaterally  Heart: RRR, +S1, S2, no appreciable murmur  Abdomen: Soft, nontender, BS +  MSK: s/p right hip surgery.   Neurologic: Alert and oriented x4  Psych: Calm, cooperative    VITAL SIGNS: 24 HRS MIN & MAX LAST   Temp  Min: 97.5 °F (36.4 °C)  Max: 98.6 °F (37 °C) 98.6 °F (37 °C)   BP  Min: 104/67  Max: 149/52 111/70   Pulse  Min: 83  Max: 98  84   Resp  Min: 18  Max: 20 20   SpO2  Min: 94 %  Max: 95 % 95 %       Recent Labs   Lab 04/08/24  1637 04/09/24  0432 04/10/24  0505   WBC  --  6.34 5.61   RBC  --  3.57* 3.23*   HGB 11.1* 10.1* 9.1*   HCT  35.2* 32.0* 28.8*   MCV  --  89.6 89.2   MCH  --  28.3 28.2   MCHC  --  31.6* 31.6*   RDW  --  15.9 16.1   PLT  --  147 128*   MPV  --  10.8* 10.8*       Recent Labs   Lab 04/09/24  0432 04/10/24  0505    136   K 4.1 3.6   CO2 21* 18*   BUN 15.5 20.5   CREATININE 1.22* 1.39*   CALCIUM 8.7* 8.8        Microbiology Results (last 7 days)       Procedure Component Value Units Date/Time    Tissue Culture - Aerobic [[id number]] Collected: 04/08/24 1412    Order Status: Completed Specimen: Tissue from Hip, Right Updated: 04/10/24 0957     CULTURE, TISSUE- AEROBIC ([facility]) No Growth At 48 Hours    Tissue Culture - Aerobic [[id number]] Collected: 04/08/24 1411    Order Status: Completed Specimen: Tissue from Hip, Right Updated: 04/10/24 0956     CULTURE, TISSUE- AEROBIC ([facility]) No Growth At 48 Hours    Tissue Culture - Aerobic [[id number]] Collected: 04/08/24 1412    Order Status: Completed Specimen: Tissue from Hip, Right Updated: 04/10/24 0955     CULTURE, TISSUE- AEROBIC ([facility]) No Growth At 48 Hours    Anaerobic Culture [[id number]] Collected: 04/08/24 1412    Order Status: Completed Specimen: Tissue from Hip, Right Updated: 04/10/24 0738     Anaerobe Culture No Anaerobes Isolated    Anaerobic Culture [[id number]] Collected: 04/08/24 1412    Order Status: Completed Specimen: Tissue from Hip, Right Updated: 04/10/24 0737     Anaerobe Culture No Anaerobes Isolated    Anaerobic Culture [[id number]] Collected: 04/08/24 1411    Order Status: Completed Specimen: Tissue from Hip, Right Updated: 04/10/24 0736     Anaerobe Culture No Anaerobes Isolated    Blood Culture [[id number]] Collected: 04/09/24 1300    Order Status: Resulted Specimen: Blood from Antecubital, Right Updated: 04/09/24 1312    Blood Culture [[id number]] Collected: 04/09/24 1311    Order Status: Resulted Specimen: Blood from Hand, Right Updated: 04/09/24 1312    Gram Stain [[id number]] Collected: 04/08/24 1411    Order Status: Completed Specimen: Tissue from  Hip, Right Updated: 04/09/24 0820     GRAM STAIN No WBCs, No bacteria seen    Gram Stain [[id number]] Collected: 04/08/24 1412    Order Status: Completed Specimen: Tissue from Hip, Right Updated: 04/09/24 0819     GRAM STAIN No WBCs, No bacteria seen    Gram Stain [[id number]] Collected: 04/08/24 1412    Order Status: Completed Specimen: Tissue from Hip, Right Updated: 04/09/24 0819     GRAM STAIN Rare WBC observed      No bacteria seen             Radiology:  X-Ray Hip 1 View Right (with Pelvis when performed)  Narrative: EXAMINATION:  XR HIP WITH PELVIS WHEN PERFORMED, 1 VIEW RIGHT    CLINICAL HISTORY:  Post-op; Broken internal joint prosthesis, other site, initial encounter    TECHNIQUE:  Frontal and frogleg lateral views of the right hip    COMPARISON:  Radiography 01/25/2024    FINDINGS:  Antibiotic beads adjacent to the proximal right femur.  Aligned right hip.  Impression: Hip arthroplasty revision with antibiotic bead placement.    Electronically signed by: Yunier Whiting  Date:    04/08/2024  Time:    16:50      Scheduled Med:   acetaminophen  500 mg Oral Q4H    apixaban  5 mg Oral BID    [START ON 4/11/2024] bisacodyL  10 mg Rectal Daily    docusate sodium  200 mg Oral Daily    doxazosin  4 mg Oral Daily    famotidine  20 mg Oral BID    finasteride  5 mg Oral Daily    gabapentin  300 mg Oral QHS    pioglitazone  30 mg Oral Daily    polyethylene glycol  17 g Oral QHS    senna-docusate 8.6-50 mg  2 tablet Oral BID    vancomycin (VANCOCIN) IV (PEDS and ADULTS)  1,000 mg Intravenous Q12H        Continuous Infusions:   sodium chloride 0.9% 75 mL/hr at 04/08/24 1703        PRN Meds:  aluminum-magnesium hydroxide-simethicone, dextrose 10%, dextrose 10%, glucagon (human recombinant), glucose, glucose, insulin aspart U-100, lactulose, melatonin, methocarbamoL, ondansetron, traMADoL, Pharmacy to dose Vancomycin consult **AND** vancomycin - pharmacy to dose     Nutrition Status:      Assessment/Plan:    S/p Revision  right COLLIN 4/8, 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 I have reviewed or attempted to review medical records based upon their availability      _____________________________________________________________________            Irwin Art MD   04/10/2024

## 2024-04-10 NOTE — PLAN OF CARE
Problem: Physical Therapy  Goal: Physical Therapy Goal  Description: Pt will improve functional independence by performing:    Bed mobility: SBA  Sit to stand: SBA with rolling walker   Bed to chair t/f: SBA with Stand Step  with rolling walker  Ambulation x 200'  with SBA with rolling walker   1 Step (Curb): Min A  with rolling walker MET  3 Steps: Min A  with B HR MET  Independent with total hip HEP   Outcome: Ongoing, Progressing

## 2024-04-10 NOTE — PT/OT/SLP PROGRESS
Physical Therapy      Patient Name:  Chris Mendoza II   MRN:  [id number]    Patient not seen today secondary to unwilling to participate . Pt stated he is tired and can not do anymore therapy today because he has done too much. Will follow-up when schedule allows.

## 2024-04-10 NOTE — PROGRESS NOTES
Inpatient Nutrition Evaluation    Admit Date: 4/8/2024   Total duration of encounter: 2 days   Patient Age: 78 y.o.    Nutrition Recommendation/Prescription     Continue Heart Healthy diet as tolerated  Weigh weekly    Nutrition Assessment     Chart Review    Reason Seen: continuous nutrition monitoring    Malnutrition Screening Tool Results   Have you recently lost weight without trying?: No  Have you been eating poorly because of a decreased appetite?: No   MST Score: 0   Diagnosis:  Failed total hip arthroplasty    Relevant Medical History:   Arthritis, a-fib, bladder cancer, CKD, DM, gout, HLD, HTN, RLS, seasonal allergies, sleep apnea, thyroid disease    Scheduled Medications:  acetaminophen, 500 mg, Q4H  apixaban, 5 mg, BID  [START ON 4/11/2024] bisacodyL, 10 mg, Daily  docusate sodium, 200 mg, Daily  doxazosin, 4 mg, Daily  famotidine, 20 mg, BID  finasteride, 5 mg, Daily  gabapentin, 300 mg, QHS  pioglitazone, 30 mg, Daily  polyethylene glycol, 17 g, QHS  senna-docusate 8.6-50 mg, 2 tablet, BID  vancomycin (VANCOCIN) IV (PEDS and ADULTS), 1,000 mg, Q12H    Continuous Infusions:  sodium chloride 0.9%, Last Rate: 75 mL/hr at 04/08/24 1703    PRN Medications: aluminum-magnesium hydroxide-simethicone, dextrose 10%, dextrose 10%, glucagon (human recombinant), glucose, glucose, insulin aspart U-100, lactulose, melatonin, methocarbamoL, ondansetron, traMADoL, Pharmacy to dose Vancomycin consult **AND** vancomycin - pharmacy to dose    Recent Labs   Lab 04/08/24  1637 04/09/24  0432 04/10/24  0505   NA  --  138 136   K  --  4.1 3.6   CALCIUM  --  8.7* 8.8   CHLORIDE  --  108* 107   CO2  --  21* 18*   BUN  --  15.5 20.5   CREATININE  --  1.22* 1.39*   EGFRNORACEVR  --  >60 52   GLUCOSE  --  85 103   WBC  --  6.34 5.61   HGB 11.1* 10.1* 9.1*   HCT 35.2* 32.0* 28.8*     Nutrition Orders:  Diet Heart Healthy      Appetite/Oral Intake: good/50-75% of meals  Factors Affecting Nutritional Intake: none  identified  Food/Yazidism/Cultural Preferences: none reported  Food Allergies: none reported  Last Bowel Movement: 04/07/24  Wound(s):  intact    Comments    (4/10) Pt with good appetite. Stated he consumes about 50% of meals due to preferences.     Anthropometrics    Height: 6' (182.9 cm), Height Method: Stated  Last Weight: (!) 149.2 kg (329 lb) (04/09/24 1446), Weight Method: Bed Scale  BMI (Calculated): 44.6  BMI Classification: obese grade III (BMI >/=40)        Ideal Body Weight (IBW), Male: 178 lb     % Ideal Body Weight, Male (lb): 128.65 %                 Usual Body Weight (UBW), kg: (!) 149.2 kg  % Usual Body Weight: 100.23     Usual Weight Provided By: patient    Wt Readings from Last 5 Encounters:   04/09/24 (!) 149.2 kg (329 lb)   03/26/24 (!) 157.4 kg (347 lb)   02/27/24 (!) 154.2 kg (340 lb)   02/02/24 (!) 154.2 kg (340 lb)   02/01/24 (!) 157.9 kg (348 lb)     Weight Change(s) Since Admission: new admit. Wt of 229# is an error  Wt Readings from Last 1 Encounters:   04/09/24 1446 (!) 149.2 kg (329 lb)   04/08/24 0854 103.9 kg (229 lb)   03/25/24 1025 (!) 154.2 kg (340 lb)   Admit Weight: (!) 154.2 kg (340 lb) (03/25/24 1025), Weight Method: Stated        Nutrition Goals & Monitoring     Dietitian will monitor: energy intake, weight, electrolyte/renal panel, glucose/endocrine profile, and gastrointestinal profile    Nutrition Risk/Follow-Up: low (follow-up in 5-7 days)  Patients assigned 'low nutrition risk' status do not qualify for a full nutritional assessment but will be monitored and re-evaluated in a 5-7 day time period. Please consult if re-evaluation needed sooner.

## 2024-04-10 NOTE — PT/OT/SLP PROGRESS
Physical Therapy Treatment    Patient Name:  Chris Mendoza II   MRN:  [id number]    Recommendations:     Discharge Recommendations: Low Intensity Therapy  Discharge Equipment Recommendations: walker, rolling  Barriers to discharge: None    Assessment:     Chris Mendoza II is a 78 y.o. male admitted with a medical diagnosis of Failed total hip arthroplasty, initial encounter.  He presents with the following impairments/functional limitations: weakness, impaired endurance, impaired functional mobility, decreased lower extremity function, pain, decreased ROM, edema, orthopedic precautions .    Rehab Prognosis: Good; patient would benefit from acute skilled PT services to address these deficits and reach maximum level of function.    Recent Surgery: Procedure(s) (LRB):  REVISION, TOTAL ARTHROPLASTY, HIP - revision R COLLIN with abx spacer (Right) 2 Days Post-Op    Plan:     During this hospitalization, patient to be seen BID to address the identified rehab impairments via gait training, therapeutic activities, therapeutic exercises and progress toward the following goals:    Plan of Care Expires:  04/15/24    Subjective     Chief Complaint: R hip pain  Patient/Family Comments/goals:   Pain/Comfort:  Location - Side 1: Right  Location 1: hip  Pain Addressed 1: Pre-medicate for activity, Reposition, Distraction, Cessation of Activity      Objective:     Communicated with nurse prior to session.  Patient found supine with peripheral IV, philippe catheter, wound vac upon PT entry to room.     General Precautions: Standard, fall  Orthopedic Precautions: RLE posterior precautions, RLE partial weight bearing  Braces:    Respiratory Status: Room air     Functional Mobility:  Bed Mobility:     Supine to Sit: moderate assistance  Transfers:     Sit to Stand:  contact guard assistance with rolling walker  Gait: Pt ambulated 200 ft; 200 ft. W rw and CGA, using step through gait pattern at normal pace.  Stairs:  Pt ascended/descended 3  "stair(s) and 4" curb step with Rolling Walker with bilateral handrails with Contact Guard Assistance.         Treatment & Education:  Pt edu on total hip precautions and partial WB status  Pt completed COLLIN therex x10 AAROM    Patient left up in chair with all lines intact, call button in reach, and nurse notified..    GOALS:   Multidisciplinary Problems       Physical Therapy Goals          Problem: Physical Therapy    Goal Priority Disciplines Outcome Goal Variances Interventions   Physical Therapy Goal     PT, PT/OT Ongoing, Progressing     Description: Pt will improve functional independence by performing:    Bed mobility: SBA  Sit to stand: SBA with rolling walker   Bed to chair t/f: SBA with Stand Step  with rolling walker  Ambulation x 200'  with SBA with rolling walker   1 Step (Curb): Min A  with rolling walker MET  3 Steps: Min A  with B HR MET  Independent with total hip HEP                        Time Tracking:     PT Received On:    PT Start Time: 0833     PT Stop Time: 0900  PT Total Time (min): 27 min     Billable Minutes: Gait Training 19 and Therapeutic Activity 8    Treatment Type: Treatment  PT/PTA: PT     Number of PTA visits since last PT visit: 0     04/10/2024  "

## 2024-04-10 NOTE — PROGRESS NOTES
Pharmacokinetic Assessment Follow Up: IV Vancomycin    Vancomycin serum concentration assessment(s):    The trough level was drawn correctly and can be used to guide therapy at this time. The measurement is within the desired definitive target range of 15 to 20 mcg/mL.    Vancomycin Regimen Plan:    Continue regimen to Vancomycin 1,000 mg IV every 12 hours with next serum trough concentration measured at 1300 prior to third dose on 04/11.    Scheduled Administration Times    0200  1400    Drug levels (last 3 results):  Recent Labs   Lab Result Units 04/10/24  1259   Vancomycin Trough ug/ml 16.9       Vancomycin Administrations:  vancomycin given in the last 96 hours                     vancomycin (VANCOCIN) 1,000 mg in dextrose 5 % (D5W) 250 mL IVPB (Vial-Mate) (mg) 1,000 mg New Bag 04/10/24 0305    vancomycin (VANCOCIN) 1,000 mg in dextrose 5 % (D5W) 250 mL IVPB (Vial-Mate) (mg) 1,000 mg New Bag 04/09/24 1629    vancomycin (VANCOCIN) 1,000 mg in dextrose 5 % (D5W) 250 mL IVPB (Vial-Mate) (mg) 1,000 mg New Bag 04/09/24 1355    vancomycin injection (g) 1 g Given 04/08/24 1416                    Pharmacy will continue to follow and monitor vancomycin.    Please contact pharmacy at extension [phone] for questions regarding this assessment.    Thank you for the consult,   Kadi Weaver       Patient brief summary:  Chris Mendoza II is a 78 y.o. male initiated on antimicrobial therapy with IV Vancomycin for treatment of bone/joint infection    The patient's current regimen is Vancomycin 1,000mg IV Q12H    Drug Allergies:   Review of patient's allergies indicates:   Allergen Reactions    Clindamycin Rash    Levofloxacin Hives       Actual Body Weight:  Wt Readings from Last 1 Encounters:   04/09/24 (!) 149.2 kg (329 lb)       Renal Function:   Estimated Creatinine Clearance: 66.9 mL/min (A) (based on SCr of 1.39 mg/dL (H)).,     Dialysis Method (if applicable):  N/A    CBC (last 72 hours):  Recent Labs   Lab Result Units  04/08/24  1637 04/09/24  0432 04/10/24  0505   WBC x10(3)/mcL  --  6.34 5.61   Hgb g/dL 11.1* 10.1* 9.1*   Hct % 35.2* 32.0* 28.8*   Platelet x10(3)/mcL  --  147 128*       Metabolic Panel (last 72 hours):  Recent Labs   Lab Result Units 04/09/24  0432 04/10/24  0505   Sodium Level mmol/L 138 136   Potassium Level mmol/L 4.1 3.6   Chloride mmol/L 108* 107   Carbon Dioxide mmol/L 21* 18*   Glucose Level mg/dL 85 103   Blood Urea Nitrogen mg/dL 15.5 20.5   Creatinine mg/dL 1.22* 1.39*       Microbiologic Results:  Microbiology Results (last 7 days)       Procedure Component Value Units Date/Time    Tissue Culture - Aerobic [[id number]] Collected: 04/08/24 1412    Order Status: Completed Specimen: Tissue from Hip, Right Updated: 04/10/24 0957     CULTURE, TISSUE- AEROBIC ([facility]) No Growth At 48 Hours    Tissue Culture - Aerobic [[id number]] Collected: 04/08/24 1411    Order Status: Completed Specimen: Tissue from Hip, Right Updated: 04/10/24 0956     CULTURE, TISSUE- AEROBIC ([facility]) No Growth At 48 Hours    Tissue Culture - Aerobic [[id number]] Collected: 04/08/24 1412    Order Status: Completed Specimen: Tissue from Hip, Right Updated: 04/10/24 0955     CULTURE, TISSUE- AEROBIC ([facility]) No Growth At 48 Hours    Anaerobic Culture [[id number]] Collected: 04/08/24 1412    Order Status: Completed Specimen: Tissue from Hip, Right Updated: 04/10/24 0738     Anaerobe Culture No Anaerobes Isolated    Anaerobic Culture [[id number]] Collected: 04/08/24 1412    Order Status: Completed Specimen: Tissue from Hip, Right Updated: 04/10/24 0737     Anaerobe Culture No Anaerobes Isolated    Anaerobic Culture [[id number]] Collected: 04/08/24 1411    Order Status: Completed Specimen: Tissue from Hip, Right Updated: 04/10/24 0736     Anaerobe Culture No Anaerobes Isolated    Blood Culture [[id number]] Collected: 04/09/24 1300    Order Status: Resulted Specimen: Blood from Antecubital, Right Updated: 04/09/24 1312    Blood Culture [[id number]]  Collected: 04/09/24 1311    Order Status: Resulted Specimen: Blood from Hand, Right Updated: 04/09/24 1312    Gram Stain [[id number]] Collected: 04/08/24 1411    Order Status: Completed Specimen: Tissue from Hip, Right Updated: 04/09/24 0820     GRAM STAIN No WBCs, No bacteria seen    Gram Stain [[id number]] Collected: 04/08/24 1412    Order Status: Completed Specimen: Tissue from Hip, Right Updated: 04/09/24 0819     GRAM STAIN No WBCs, No bacteria seen    Gram Stain [[id number]] Collected: 04/08/24 1412    Order Status: Completed Specimen: Tissue from Hip, Right Updated: 04/09/24 0819     GRAM STAIN Rare WBC observed      No bacteria seen

## 2024-04-10 NOTE — PLAN OF CARE
Problem: Adult Inpatient Plan of Care  Goal: Plan of Care Review  Outcome: Ongoing, Progressing  Flowsheets (Taken 4/9/2024 2156)  Plan of Care Reviewed With: patient     Problem: Diabetes Comorbidity  Goal: Blood Glucose Level Within Targeted Range  Outcome: Ongoing, Progressing  Intervention: Monitor and Manage Glycemia  Flowsheets (Taken 4/9/2024 2156)  Glycemic Management: blood glucose monitored     Problem: Infection  Goal: Absence of Infection Signs and Symptoms  Outcome: Ongoing, Progressing  Intervention: Prevent or Manage Infection  Flowsheets (Taken 4/9/2024 2156)  Infection Management: aseptic technique maintained     Problem: Fall Injury Risk  Goal: Absence of Fall and Fall-Related Injury  Outcome: Ongoing, Progressing  Intervention: Identify and Manage Contributors  Flowsheets (Taken 4/9/2024 2156)  Self-Care Promotion: safe use of adaptive equipment encouraged  Medication Review/Management: medications reviewed  Intervention: Promote Injury-Free Environment  Flowsheets (Taken 4/9/2024 2156)  Safety Promotion/Fall Prevention:   assistive device/personal item within reach   lighting adjusted   medications reviewed   nonskid shoes/socks when out of bed   instructed to call staff for mobility

## 2024-04-11 LAB
ANION GAP SERPL CALC-SCNC: 7 MEQ/L
BACTERIA SPEC ANAEROBE CULT: NORMAL
BUN SERPL-MCNC: 14.1 MG/DL (ref 8.4–25.7)
CALCIUM SERPL-MCNC: 9 MG/DL (ref 8.8–10)
CHLORIDE SERPL-SCNC: 108 MMOL/L (ref 98–107)
CO2 SERPL-SCNC: 22 MMOL/L (ref 23–31)
CREAT SERPL-MCNC: 0.95 MG/DL (ref 0.73–1.18)
CREAT/UREA NIT SERPL: 15
ERYTHROCYTE [DISTWIDTH] IN BLOOD BY AUTOMATED COUNT: 16 % (ref 11.5–17)
GFR SERPLBLD CREATININE-BSD FMLA CKD-EPI: >60 MLS/MIN/1.73/M2
GLUCOSE SERPL-MCNC: 107 MG/DL (ref 82–115)
HCT VFR BLD AUTO: 26.8 % (ref 42–52)
HGB BLD-MCNC: 8.5 G/DL (ref 14–18)
MCH RBC QN AUTO: 28.5 PG (ref 27–31)
MCHC RBC AUTO-ENTMCNC: 31.7 G/DL (ref 33–36)
MCV RBC AUTO: 89.9 FL (ref 80–94)
NRBC BLD AUTO-RTO: 0 %
PLATELET # BLD AUTO: 132 X10(3)/MCL (ref 130–400)
PLATELETS.RETICULATED NFR BLD AUTO: 4.2 % (ref 0.9–11.2)
PMV BLD AUTO: 10.9 FL (ref 7.4–10.4)
POCT GLUCOSE: 113 MG/DL (ref 70–110)
POCT GLUCOSE: 114 MG/DL (ref 70–110)
POCT GLUCOSE: 125 MG/DL (ref 70–110)
POTASSIUM SERPL-SCNC: 3.6 MMOL/L (ref 3.5–5.1)
RBC # BLD AUTO: 2.98 X10(6)/MCL (ref 4.7–6.1)
SODIUM SERPL-SCNC: 137 MMOL/L (ref 136–145)
VANCOMYCIN TROUGH SERPL-MCNC: 18.6 UG/ML (ref 15–20)
VIEW PATHOLOGY REPORT (RELIAPATH): NORMAL
WBC # SPEC AUTO: 5.24 X10(3)/MCL (ref 4.5–11.5)

## 2024-04-11 PROCEDURE — 97116 GAIT TRAINING THERAPY: CPT

## 2024-04-11 PROCEDURE — 63600175 PHARM REV CODE 636 W HCPCS: Performed by: GENERAL PRACTICE

## 2024-04-11 PROCEDURE — 85027 COMPLETE CBC AUTOMATED: CPT | Performed by: INTERNAL MEDICINE

## 2024-04-11 PROCEDURE — 02HV33Z INSERTION OF INFUSION DEVICE INTO SUPERIOR VENA CAVA, PERCUTANEOUS APPROACH: ICD-10-PCS | Performed by: ORTHOPAEDIC SURGERY

## 2024-04-11 PROCEDURE — 97530 THERAPEUTIC ACTIVITIES: CPT

## 2024-04-11 PROCEDURE — 94799 UNLISTED PULMONARY SVC/PX: CPT

## 2024-04-11 PROCEDURE — 80048 BASIC METABOLIC PNL TOTAL CA: CPT | Performed by: NURSE PRACTITIONER

## 2024-04-11 PROCEDURE — 25000003 PHARM REV CODE 250: Performed by: NURSE PRACTITIONER

## 2024-04-11 PROCEDURE — 36569 INSJ PICC 5 YR+ W/O IMAGING: CPT

## 2024-04-11 PROCEDURE — 80202 ASSAY OF VANCOMYCIN: CPT | Performed by: GENERAL PRACTICE

## 2024-04-11 PROCEDURE — C1751 CATH, INF, PER/CENT/MIDLINE: HCPCS

## 2024-04-11 PROCEDURE — 99900031 HC PATIENT EDUCATION (STAT)

## 2024-04-11 PROCEDURE — 25000003 PHARM REV CODE 250: Performed by: GENERAL PRACTICE

## 2024-04-11 PROCEDURE — 11000001 HC ACUTE MED/SURG PRIVATE ROOM

## 2024-04-11 PROCEDURE — 94761 N-INVAS EAR/PLS OXIMETRY MLT: CPT

## 2024-04-11 PROCEDURE — 25000003 PHARM REV CODE 250: Performed by: ORTHOPAEDIC SURGERY

## 2024-04-11 RX ORDER — SODIUM CHLORIDE 0.9 % (FLUSH) 0.9 %
10 SYRINGE (ML) INJECTION EVERY 6 HOURS
Status: DISCONTINUED | OUTPATIENT
Start: 2024-04-11 | End: 2024-04-12 | Stop reason: HOSPADM

## 2024-04-11 RX ORDER — POLYETHYLENE GLYCOL 3350 17 G/17G
17 POWDER, FOR SOLUTION ORAL 2 TIMES DAILY
Qty: 28 EACH | Refills: 0 | Status: SHIPPED | OUTPATIENT
Start: 2024-04-11 | End: 2024-04-25

## 2024-04-11 RX ORDER — SODIUM CHLORIDE 0.9 % (FLUSH) 0.9 %
10 SYRINGE (ML) INJECTION
Status: DISCONTINUED | OUTPATIENT
Start: 2024-04-11 | End: 2024-04-12 | Stop reason: HOSPADM

## 2024-04-11 RX ADMIN — DOXAZOSIN 4 MG: 4 TABLET ORAL at 09:04

## 2024-04-11 RX ADMIN — SENNOSIDES AND DOCUSATE SODIUM 2 TABLET: 8.6; 5 TABLET ORAL at 09:04

## 2024-04-11 RX ADMIN — VANCOMYCIN HYDROCHLORIDE 1000 MG: 1 INJECTION, POWDER, LYOPHILIZED, FOR SOLUTION INTRAVENOUS at 04:04

## 2024-04-11 RX ADMIN — ACETAMINOPHEN 500 MG: 500 TABLET ORAL at 09:04

## 2024-04-11 RX ADMIN — ACETAMINOPHEN 500 MG: 500 TABLET ORAL at 04:04

## 2024-04-11 RX ADMIN — GABAPENTIN 300 MG: 300 CAPSULE ORAL at 09:04

## 2024-04-11 RX ADMIN — FAMOTIDINE 20 MG: 20 TABLET ORAL at 09:04

## 2024-04-11 RX ADMIN — ACETAMINOPHEN 500 MG: 500 TABLET ORAL at 11:04

## 2024-04-11 RX ADMIN — PIOGLITAZONE 30 MG: 15 TABLET ORAL at 09:04

## 2024-04-11 RX ADMIN — POLYETHYLENE GLYCOL 3350 17 G: 17 POWDER, FOR SOLUTION ORAL at 09:04

## 2024-04-11 RX ADMIN — APIXABAN 5 MG: 5 TABLET, FILM COATED ORAL at 09:04

## 2024-04-11 RX ADMIN — DOCUSATE SODIUM 200 MG: 100 CAPSULE, LIQUID FILLED ORAL at 09:04

## 2024-04-11 RX ADMIN — ACETAMINOPHEN 500 MG: 500 TABLET ORAL at 12:04

## 2024-04-11 RX ADMIN — TRAMADOL HYDROCHLORIDE 50 MG: 50 TABLET, COATED ORAL at 04:04

## 2024-04-11 RX ADMIN — VANCOMYCIN HYDROCHLORIDE 1000 MG: 1 INJECTION, POWDER, LYOPHILIZED, FOR SOLUTION INTRAVENOUS at 02:04

## 2024-04-11 RX ADMIN — TRAMADOL HYDROCHLORIDE 50 MG: 50 TABLET, COATED ORAL at 09:04

## 2024-04-11 RX ADMIN — FINASTERIDE 5 MG: 5 TABLET, FILM COATED ORAL at 09:04

## 2024-04-11 NOTE — PROCEDURES
Chris Mendoza II is a 78 y.o. male patient.    Temp: 97.7 °F (36.5 °C) (04/11/24 1121)  Pulse: 73 (04/11/24 1121)  Resp: 18 (04/11/24 1121)  BP: (!) 100/51 (04/11/24 1121)  SpO2: (!) 93 % (04/11/24 1121)  Weight: (!) 149.2 kg (329 lb) (04/09/24 1446)  Height: 6' (182.9 cm) (04/08/24 0854)    PICC  Date/Time: 4/11/2024 3:58 PM  Performed by: Barry Foss RN  Consent Done: Yes  Time out: Immediately prior to procedure a time out was called to verify the correct patient, procedure, equipment, support staff and site/side marked as required  Indications: med administration and vascular access  Anesthesia: local infiltration  Local anesthetic: lidocaine 1% without epinephrine  Anesthetic Total (mL): 4  Preparation: skin prepped with ChloraPrep  Skin prep agent dried: skin prep agent completely dried prior to procedure  Sterile barriers: all five maximum sterile barriers used - cap, mask, sterile gown, sterile gloves, and large sterile sheet  Hand hygiene: hand hygiene performed prior to central venous catheter insertion  Location details: right basilic  Catheter type: double lumen  Catheter size: 5 Fr  Catheter Length: 45cm    Ultrasound guidance: yes  Vessel Caliber: medium and patent, compressibility normal  Needle advanced into vessel with real time Ultrasound guidance.  Guidewire confirmed in vessel.  Sterile sheath used.  Number of attempts: 1  Post-procedure: blood return through all ports, sterile dressing applied and chlorhexidine patch    Assessment: placement verified by x-ray  Complications: none          Barry Foss RN  4/11/2024

## 2024-04-11 NOTE — PT/OT/SLP PROGRESS
"Occupational Therapy   Treatment    Name: Chris Mendoza II  MRN: [id number]  Admitting Diagnosis:  Failed total hip arthroplasty, initial encounter  3 Days Post-Op    Recommendations:     Discharge Recommendations: Low Intensity Therapy  Discharge Equipment Recommendations:  walker, rolling  Barriers to discharge:  None    Assessment:     Chris Mendoza II is a 78 y.o. male with a medical diagnosis of Failed total hip arthroplasty, initial encounter.  Performance deficits affecting function are weakness, impaired endurance, impaired self care skills, impaired functional mobility, gait instability, impaired balance, orthopedic precautions, impaired muscle length, impaired joint extensibility, pain, decreased safety awareness, decreased lower extremity function.     Rehab Prognosis:  Fair; patient would benefit from acute skilled OT services to address these deficits and reach maximum level of function.       Plan:     Patient to be seen daily (BID) to address the above listed problems via self-care/home management, therapeutic activities, therapeutic exercises  Plan of Care Expires: 04/15/24  Plan of Care Reviewed with: patient    Subjective     Chief Complaint: pain in R heel  Patient/Family Comments/goals: "My heel is killing me, and I didn't sleep well last night."  Pain/Comfort:  Pain Rating 1: 7/10  Location - Side 1: Right  Location - Orientation 1: generalized  Location 1: heel  Pain Addressed 1: Distraction, Reposition    Objective:     Patient found HOB elevated with wound vac, peripheral IV upon OT entry to room.    General Precautions: Standard, fall    Orthopedic Precautions:RLE posterior precautions, RLE weight bearing as tolerated  Braces: N/A  Respiratory Status: Room air     Occupational Performance:     Bed Mobility:    Patient completed Rolling/Turning to Right with minimum assistance  Patient completed Scooting/Bridging with contact guard assistance  Patient completed Supine to Sit with maximal " assistance     Functional Mobility/Transfers:  Patient completed Sit <> Stand Transfer with minimum assistance  with  rolling walker and 2 attempts   Functional Mobility: Patient performed FM in hallway with RW and CGA, which progressed to SBA. He was able to tolerate ~180 feet with no rest breaks or LOBs.  Patient refused to re-attempt a car transfer, despite a failed attempt 2 days ago. He is reporting he can get into his wife's vehicle and does not need to practice here.    Treatment & Education:  Pt educated on importance of pressure relief on R heel, as well as safe positioning in bed to protect bony prominences.    Patient left ambulatory in room/hernandez with all lines intact and Piper, PT present    GOALS:   Multidisciplinary Problems       Occupational Therapy Goals          Problem: Occupational Therapy    Goal Priority Disciplines Outcome Interventions   Occupational Therapy Goal     OT, PT/OT Ongoing, Not Progressing    Description: Pt will perform LB dressing mod A with AE PRN by d/c.  Pt will perform toileting min A by d/c.  Pt will perform toilet t/f min A by d/c.  Pt will perform shower t/f min assist by d/c. MET  Pt will perform car t/f min assist in adherence to pxns by d/c.                        Time Tracking:     OT Date of Treatment: 04/11/24  OT Start Time: 0937  OT Stop Time: 0950  OT Total Time (min): 13 min    Billable Minutes:Therapeutic Activity 13    OT/CHARLIE: OT          4/11/2024

## 2024-04-11 NOTE — PT/OT/SLP PROGRESS
Physical Therapy Treatment    Patient Name:  Chris Mendoza II   MRN:  [id number]    Recommendations:     Discharge Recommendations: Low Intensity Therapy  Discharge Equipment Recommendations: walker, rolling  Barriers to discharge: None    Assessment:     Chris Mendoza II is a 78 y.o. male admitted with a medical diagnosis of Failed total hip arthroplasty, initial encounter.  He presents with the following impairments/functional limitations: weakness, impaired endurance, impaired functional mobility, decreased lower extremity function, pain, decreased ROM, edema, orthopedic precautions.    Rehab Prognosis: Good; patient would benefit from acute skilled PT services to address these deficits and reach maximum level of function.    Recent Surgery: Procedure(s) (LRB):  REVISION, TOTAL ARTHROPLASTY, HIP - revision R COLLIN with abx spacer (Right) 3 Days Post-Op    Plan:     During this hospitalization, patient to be seen BID to address the identified rehab impairments via gait training, therapeutic activities, therapeutic exercises and progress toward the following goals:    Plan of Care Expires:  04/15/24    Subjective     Chief Complaint: R hip pain  Patient/Family Comments/goals:   Pain/Comfort:  Location - Side 1: Right  Location 1: hip  Pain Addressed 1: Pre-medicate for activity, Reposition, Distraction, Cessation of Activity      Objective:     Communicated with nurse prior to session.  Patient found ambulatory in room/hernandez with OT with peripheral IV, philippe catheter, wound vac upon PT entry to room.     General Precautions: Standard, fall  Orthopedic Precautions: RLE posterior precautions, RLE partial weight bearing  Braces:    Respiratory Status: Room air     Functional Mobility:  Transfers:     Stand to Sit:  contact guard assistance with rolling walker  Gait: Pt ambulated 200 ft w rw and CGA, using step through gait pattern at slow pace.        Treatment & Education:  Pt edu on total hip precautions and importance  of frequent mobility  Pt completed COLLIN therex x10 AAROM    Patient left up in chair with all lines intact, call button in reach, and nurse notified..    GOALS:   Multidisciplinary Problems       Physical Therapy Goals          Problem: Physical Therapy    Goal Priority Disciplines Outcome Goal Variances Interventions   Physical Therapy Goal     PT, PT/OT Ongoing, Progressing     Description: Pt will improve functional independence by performing:    Bed mobility: SBA  Sit to stand: SBA with rolling walker   Bed to chair t/f: SBA with Stand Step  with rolling walker  Ambulation x 200'  with SBA with rolling walker   1 Step (Curb): Min A  with rolling walker MET  3 Steps: Min A  with B HR MET  Independent with total hip HEP                        Time Tracking:     PT Received On:    PT Start Time: 0951     PT Stop Time: 1003  PT Total Time (min): 12 min     Billable Minutes: Gait Training 12    Treatment Type: Treatment  PT/PTA: PT     Number of PTA visits since last PT visit: 0     04/11/2024

## 2024-04-11 NOTE — DISCHARGE INSTRUCTIONS
[facility]  [address] [place] [address] [address]  [place] [address]  Phone [phone]       /      Fax [phone]  SURGEON: Dr. Luna    After discharge, all questions or concerns should be handled at your surgeon's office ([phone]). If it is a weekend or after hours, you will get the surgeon on call.     Discharge Medications:    PAIN MANAGEMENT: Next Dose Available   Tylenol/Acetaminophen 500mg- every 4 hours, around the clock (WHILE AWAKE) 8pm   Ultram/Tramadol 50mg (Pain Med) - every 4-6 hours AS NEEDED for pain anytime   Robaxin/Methocarbamol 750mg (Muscle Relaxer) - Every 6-8 hours AS NEEDED for muscle spasms, thigh pain or additional pain control 5 pm   COMPLICATION PREVENTION MEDS: Next Dose DUE   Eliquis 5mg twice a day, restart home regimen for continued post-op for blood clot prevention PM on 4/12/24   MiraLAX 17gm - once or twice a day while on narcotics and muscle relaxers for constipation prevention PM on 4/12/24   Antibiotic  Next Dose Available                        Total Hip Replacement                                                                                                                                  PAIN MEDICATIONS/PAIN MANAGEMENT: (Use the medication log in your discharge packet to keep track of your medications)  Due to being on Eliquis, NO anti-inflammatories (Ibuprofen, Aleve, Motrin, Naproxen, Mobic/Meloxicam, Toradol/Ketorolac, Celebrex, Diclofenac/Voltaren...etc) for 2 weeks or until the wound is healed.    Tylenol/Acetaminophen 500mg every 4 hours, around the clock (WHILE AWAKE).   Take Ultram (Tramadol) 50mg (pain pill) every 4-6 hours as needed for pain.    **NO MORE THAN 3000mg OF TYLENOL IN 24 HOURS**.     Robaxin/Methocarbamol 750mg (muscle relaxer)- you can take every 6-8 hours as needed for muscle spasms, thigh pain and stiffness, additional pain control or breakthrough pain medications. This medication is helpful for pain control while  lessening your need for narcotics. Please reduce the use gradually as the pain and spasms lessen. DO NOT TAKE AT THE SAME TIME AS A PAIN PILL. YOU WILL BE BETTER SERVED WITH 2 HOURS BETWEEN PAIN PILL AND MUSCLE RELAXER.     **Other things that help with pain control is WALKING, COMPRESSION WRAP, ICE and ELEVATION!!**    BLOOD CLOT PREVENTION:   Restart Eliquis,  as you were taking Prior to surgery. Start on 4/12/24.     You need to continuing wearing your compression stocking (CHIP Hose - ThromboEmbolic Disease Prevention Device) for the next 2-6 weeks post-op. It is ok to remove them for hygiene and at bedtime.   Hand wash and Dry. **If the swelling persists in the legs after you stop wearing the Chip hose, continue to wear them until the swelling decreases.**  REMOVE STOCKINGS AT LEAST DAILY FOR SKIN ASSESSMENT.   Do NOT let the stockings roll down, creating a tourniquet around the back of your knee. If you need to, leave the excess at the bottom of the stocking.   The best thing you can do to prevent blood clots is to walk around as much as possible, AT LEAST EVERY 1-2 HOURS.       CONSTIPATION PREVENTION:   Miralax or Senokot S/Radha-Colace and Stool softeners EVERY DAY while on pain meds.  Use other more aggressive over the counter LAXATIVES as needed for constipation (Examples: Milk of Magnesia, Dulcolax tabs or suppository, Magnesium Citrate, Fleet's Enema...etc.)   Drink lots of water.  Increase Fiber in diet.  Increase walking distance each day  DO NOT GO MORE THAN 2 DAYS WITHOUT HAVING A BOWEL MOVEMENT!    ACTIVITY:   Weight bearing precautions as follows:   weight bearing to operative leg with walker.   HIP PRECAUTIONS:  NO Twisting  NO Leaning past 90 degrees  NO Crossing legs    DO NOT TAKE YOURSELF OFF OF THE WALKER TOO SOON. ALLOW YOUR OUTPATIENT THERAPIST or SURGEON TO GUIDE YOU.   Change positions often throughout the day.   Walk around at least every 1-2 hours while awake.   No heavy lifting, pulling,  pushing or straining.  Ice the Hip and thigh AS MUCH AS POSSIBLE        WOUND CARE:     Remove Prevena wound vac on 4/15/24 and discard the entire system. Once removed, apply an occlusive (coverlet) dressing to the wound and change every other day and as needed, until your follow-up appointment with Dr. Luna. For any issues related to the wound vac, call your surgeon. SPECIFIC INSTRUCTIONS AND TROUBLESHOOTING INFORMATION CAN ALSO BE FOUND IN YOUR DISCHARGE PACKET.   DON'T FORGET YOUR PREVENA WOUND VAC . YOU WILL NEED IT WHEN YOU GET HOME TO CHARGE THE WOUND VAC.    DO NOT WET WOUND or apply any ointments, creams, lotions or antiseptics.  Ace wrap - apply your compression stocking and apply the ace wrap where the stocking stops for extra added compression to the knee.   May wet incision AFTER 2 weeks- (after you follow-up with your surgeon).   Ok to shower before then if able to keep wound from getting wet (plastic barrier, saran wrap or cling wrap and tape).   DO NOT TOUCH INCISION      URINARY RETENTION:  If you start having difficulty urinating, decrease the use of Pain pills and muscle relaxers and notify your primary care doctor.     PNEUMONIA PREVENTION:  Stay out of bed as much as possible and walk around every 1-2 hours.  Continue breathing exercises (Incentive Spirometry) every 1-2 hours while mobility is limited and while you are on pain pills.    FALL PREVENTION:  Wear sturdy shoes that fit well - Wearing shoes with high heels or slippery soles, or shoes that are too loose, can lead to falls. Walking around in bare feet, or only socks, can also increase your risk of falling.  Use walker as long as your surgeon and therapist recommend it  Use good lighting and  throw rugs, electrical cords, furniture and clutter (anything than can cause you to trip at home.   Non-slip rug in bathroom or shower    INFECTION PREVENTION:  Proper handwashing before and after dressing changes. Do not wet the  wound. Wound care instructions as written above. NOTIFY MD OF EXCESSIVE WOUND DRAINAGE.  No alcohol, smoking or tobacco products  Pets should not be allowed around the wound or the dressing.   Treat UTI and skin infections as soon as possible.  Pre-medicate with antibiotics prior to dental or surgical procedures.   If you are diabetic, MAINTAIN GOOD BLOOD SUGAR CONTROL (Below 150) DURING YOUR RECOVERY. If you see high numbers, notify your primary care doctor.     Call your SURGEON'S OFFICE ([phone]) if you experience the following signs and symptoms of infection:   Unusual redness, swelling, excessive, cloudy or foul smelling drainage at the incision site.   Persistent low grade temp OR a temp greater than 102 F, unrelieved by Tylenol  Pain at surgical site, unrelieved by pain meds    Warning signs of a blood clot in your leg: (CALL YOUR SURGEON)  New onset or increasing pain in calf, new onset tenderness or redness above or below the knee or increasing swelling of your calf, ankle, or foot.  Warning signs that a blood clot has traveled to your lungs: (REPORT TO THE ER/CALL 404)  Sudden or increase in Shortness of breath, sudden onset of chest pains, or  Localized chest pain with coughing.       IF ANY ISSUES ARISE AND YOU FEEL THE NEED TO CALL YOUR PRIMARY CARE DOCTOR, PLEASE LET YOUR SURGEON KNOW AS WELL.     For emergencies, please report to OUR ([facility] or [facility] [place]) Emergency department and tell them to call YOUR SURGEON at [phone].     BEFORE MAKING ANY CHANGES TO THE MEDICAL CARE PLAN OR GOING TO THE EMERGENCY ROOM, PLEASE CONTACT THE SURGEON.    3rd floor nursing unit # [phone]  Use this number for questions about your discharge instructions or problems filling your discharge prescriptions.

## 2024-04-11 NOTE — PROGRESS NOTES
[facility]  [place] [facility] Medicine Progress Note      Patient Name: Chris Mendoza II  MRN: [id number]  Admission Date: 4/8/2024   Length of Stay: 3  Attending Physician: Irwin Art MD  Primary Care Provider: Shobha Mcwilliams FNP  Face-to-Face encounter date: 04/11/2024    Code Status: Prior        Chief Complaint:   No chief complaint on file.        HPI:   Chris Mendoza II is a 78 y.o. male with  has a past medical history of Arthritis, Atrial fibrillation, Bladder cancer, CKD (chronic kidney disease), Diabetes mellitus, type 2, Gout, unspecified, Hyperlipidemia, Hypertension, Restless leg syndrome, Seasonal allergies, Sleep apnea, unspecified, and Thyroid disease..admitted under Dr. Luna on 4/8/2024 with the diagnosis of right hip infection/revision COLLIN 4/8.  Hospital Medicine team was consulted for medical management   pt resting in bed, ambulated with therapy x3 today, feels good, pain controlled.  Urine is a little concentrated, no swelling trace normal for pt, urine output borderline low currently on ivfs 75cc/h.  No f/c     Overview/Hospital Course:  No notes on file       Interval Hx:   The pt has no new c/o. Picc line placed today.    Review of Systems   All other systems reviewed and are negative.      Objective/physical exam:  General: In no acute distress, afebrile  Chest: Clear to auscultation bilaterally  Heart: RRR, +S1, S2, no appreciable murmur  Abdomen: Soft, nontender, BS +  MSK: s/p right hip surgery.   Neurologic: Alert and oriented x4  Psych: Calm, cooperative    VITAL SIGNS: 24 HRS MIN & MAX LAST   Temp  Min: 97.7 °F (36.5 °C)  Max: 98.7 °F (37.1 °C) 98.2 °F (36.8 °C)   BP  Min: 100/51  Max: 118/63 112/72   Pulse  Min: 72  Max: 96  72   Resp  Min: 18  Max: 20 18   SpO2  Min: 93 %  Max: 98 % 98 %       Recent Labs   Lab 04/09/24  0432 04/10/24  0505 04/11/24  0419   WBC 6.34 5.61 5.24   RBC 3.57* 3.23* 2.98*   HGB 10.1* 9.1* 8.5*   HCT  32.0* 28.8* 26.8*   MCV 89.6 89.2 89.9   MCH 28.3 28.2 28.5   MCHC 31.6* 31.6* 31.7*   RDW 15.9 16.1 16.0    128* 132   MPV 10.8* 10.8* 10.9*         Recent Labs   Lab 04/09/24  0432 04/10/24  0505 04/11/24  0419    136 137   K 4.1 3.6 3.6   CO2 21* 18* 22*   BUN 15.5 20.5 14.1   CREATININE 1.22* 1.39* 0.95   CALCIUM 8.7* 8.8 9.0          Microbiology Results (last 7 days)       Procedure Component Value Units Date/Time    Tissue Culture - Aerobic [[id number]]  (Abnormal) Collected: 04/08/24 1412    Order Status: Completed Specimen: Tissue from Hip, Right Updated: 04/11/24 1155     CULTURE, TISSUE- AEROBIC ([facility]) Rare Staphylococcus aureus    Tissue Culture - Aerobic [[id number]] Collected: 04/08/24 1411    Order Status: Completed Specimen: Tissue from Hip, Right Updated: 04/11/24 1028     CULTURE, TISSUE- AEROBIC ([facility]) No Growth At 72 Hours    Anaerobic Culture [[id number]] Collected: 04/08/24 1412    Order Status: Completed Specimen: Tissue from Hip, Right Updated: 04/11/24 1028     Anaerobe Culture No Anaerobes Isolated    Tissue Culture - Aerobic [[id number]] Collected: 04/08/24 1412    Order Status: Completed Specimen: Tissue from Hip, Right Updated: 04/11/24 1028     CULTURE, TISSUE- AEROBIC ([facility]) No Growth At 72 Hours    Anaerobic Culture [[id number]] Collected: 04/08/24 1412    Order Status: Completed Specimen: Tissue from Hip, Right Updated: 04/11/24 1014     Anaerobe Culture No Anaerobes Isolated    Anaerobic Culture [[id number]] Collected: 04/08/24 1411    Order Status: Completed Specimen: Tissue from Hip, Right Updated: 04/11/24 1014     Anaerobe Culture No Anaerobes Isolated    Blood Culture [[id number]]  (Normal) Collected: 04/09/24 1300    Order Status: Completed Specimen: Blood from Antecubital, Right Updated: 04/10/24 1901     CULTURE, BLOOD ([facility]) No Growth At 24 Hours    Blood Culture [[id number]]  (Normal) Collected: 04/09/24 1311    Order Status: Completed Specimen: Blood from Hand,  Right Updated: 04/10/24 1901     CULTURE, BLOOD ([facility]) No Growth At 24 Hours    Gram Stain [[id number]] Collected: 04/08/24 1411    Order Status: Completed Specimen: Tissue from Hip, Right Updated: 04/09/24 0820     GRAM STAIN No WBCs, No bacteria seen    Gram Stain [[id number]] Collected: 04/08/24 1412    Order Status: Completed Specimen: Tissue from Hip, Right Updated: 04/09/24 0819     GRAM STAIN No WBCs, No bacteria seen    Gram Stain [[id number]] Collected: 04/08/24 1412    Order Status: Completed Specimen: Tissue from Hip, Right Updated: 04/09/24 0819     GRAM STAIN Rare WBC observed      No bacteria seen             Radiology:  X-Ray Chest 1 View for Line/Tube Placement  Narrative: EXAMINATION:  XR CHEST 1 VIEW FOR LINE/TUBE PLACEMENT    CLINICAL HISTORY:  PICC;    TECHNIQUE:  Single frontal view of the chest was performed.    COMPARISON:  03/26/2024    FINDINGS:  LINES AND TUBES: Right upper extremity PICC tip projects over the SVC.    MEDIASTINUM AND JEANNIE: The cardiac silhouette is normal.    LUNGS: No lobar consolidation. No edema.    PLEURA:No pleural effusion. No pneumothorax.    OTHER: No acute osseous abnormality.  Impression: No acute cardiopulmonary abnormality.    Electronically signed by: Mindi Haider  Date:    04/11/2024  Time:    16:31      Scheduled Med:   acetaminophen  500 mg Oral Q4H    apixaban  5 mg Oral BID    bisacodyL  10 mg Rectal Daily    docusate sodium  200 mg Oral Daily    doxazosin  4 mg Oral Daily    famotidine  20 mg Oral BID    finasteride  5 mg Oral Daily    gabapentin  300 mg Oral QHS    pioglitazone  30 mg Oral Daily    polyethylene glycol  17 g Oral QHS    senna-docusate 8.6-50 mg  2 tablet Oral BID    sodium chloride 0.9%  10 mL Intravenous Q6H    vancomycin (VANCOCIN) IV (PEDS and ADULTS)  1,000 mg Intravenous Q12H        Continuous Infusions:   sodium chloride 0.9% 75 mL/hr at 04/08/24 1703        PRN Meds:  aluminum-magnesium hydroxide-simethicone, dextrose 10%,  dextrose 10%, glucagon (human recombinant), glucose, glucose, insulin aspart U-100, lactulose, melatonin, methocarbamoL, ondansetron, Flushing PICC/Midline Protocol **AND** sodium chloride 0.9% **AND** sodium chloride 0.9%, traMADoL, Pharmacy to dose Vancomycin consult **AND** vancomycin - pharmacy to dose     Nutrition Status:      Assessment/Plan:    S/p Revision right COLLIN 4/8, 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 I have reviewed or attempted to review medical records based upon their availability      _____________________________________________________________________            Irwin Art MD   04/11/2024

## 2024-04-11 NOTE — PLAN OF CARE
Per Dr Art, pt will likely need 6 wks of IV abx ( poss Vanco). Awaiting I.D. final recommendations.   Spk w Dr Jackson -- plan to assess tomorrow.     F/u with pt -- faxed orders to [facility].     Plan PICC insertion.

## 2024-04-11 NOTE — PROGRESS NOTES
Pharmacokinetic Assessment Follow Up: IV Vancomycin    Vancomycin serum concentration assessment(s):    The trough level was drawn correctly and can be used to guide therapy at this time. The measurement is within the desired definitive target range of 15 to 20 mcg/mL.    Vancomycin Regimen Plan:  Renal function has improved.   Continue regimen to Vancomycin 1,000 mg IV every 12 hours with next serum trough concentration measured at 1300 prior to fifth dose on 04/13    Scheduled Administration Times    0200  1400    Drug levels (last 3 results):  Recent Labs   Lab Result Units 04/10/24  1259 04/11/24  1328   Vancomycin Trough ug/ml 16.9 18.6       Vancomycin Administrations:  vancomycin given in the last 96 hours                     vancomycin (VANCOCIN) 1,000 mg in dextrose 5 % (D5W) 250 mL IVPB (Vial-Mate) (mg) 1,000 mg New Bag 04/11/24 0223     1,000 mg New Bag 04/10/24 1357     1,000 mg New Bag  0305    vancomycin (VANCOCIN) 1,000 mg in dextrose 5 % (D5W) 250 mL IVPB (Vial-Mate) (mg) 1,000 mg New Bag 04/09/24 1629    vancomycin (VANCOCIN) 1,000 mg in dextrose 5 % (D5W) 250 mL IVPB (Vial-Mate) (mg) 1,000 mg New Bag 04/09/24 1355    vancomycin injection (g) 1 g Given 04/08/24 1416                    Pharmacy will continue to follow and monitor vancomycin.    Please contact pharmacy at extension [phone] for questions regarding this assessment.    Thank you for the consult,   Kadi Weaver       Patient brief summary:  Chris Mendoza II is a 78 y.o. male initiated on antimicrobial therapy with IV Vancomycin for treatment of bone/joint infection    The patient's current regimen is Vancomycin 1,000mg IV Q12h    Drug Allergies:   Review of patient's allergies indicates:   Allergen Reactions    Clindamycin Rash    Levofloxacin Hives       Actual Body Weight:  Wt Readings from Last 1 Encounters:   04/09/24 (!) 149.2 kg (329 lb)       Renal Function:   Estimated Creatinine Clearance: 97.8 mL/min (based on SCr of 0.95  mg/dL).,     Dialysis Method (if applicable):  N/A    CBC (last 72 hours):  Recent Labs   Lab Result Units 04/08/24  1637 04/09/24  0432 04/10/24  0505 04/11/24  0419   WBC x10(3)/mcL  --  6.34 5.61 5.24   Hgb g/dL 11.1* 10.1* 9.1* 8.5*   Hct % 35.2* 32.0* 28.8* 26.8*   Platelet x10(3)/mcL  --  147 128* 132       Metabolic Panel (last 72 hours):  Recent Labs   Lab Result Units 04/09/24  0432 04/10/24  0505 04/11/24  0419   Sodium Level mmol/L 138 136 137   Potassium Level mmol/L 4.1 3.6 3.6   Chloride mmol/L 108* 107 108*   Carbon Dioxide mmol/L 21* 18* 22*   Glucose Level mg/dL 85 103 107   Blood Urea Nitrogen mg/dL 15.5 20.5 14.1   Creatinine mg/dL 1.22* 1.39* 0.95       Microbiologic Results:  Microbiology Results (last 7 days)       Procedure Component Value Units Date/Time    Tissue Culture - Aerobic [[id number]]  (Abnormal) Collected: 04/08/24 1412    Order Status: Completed Specimen: Tissue from Hip, Right Updated: 04/11/24 1155     CULTURE, TISSUE- AEROBIC ([facility]) Rare Staphylococcus aureus    Tissue Culture - Aerobic [[id number]] Collected: 04/08/24 1411    Order Status: Completed Specimen: Tissue from Hip, Right Updated: 04/11/24 1028     CULTURE, TISSUE- AEROBIC ([facility]) No Growth At 72 Hours    Anaerobic Culture [[id number]] Collected: 04/08/24 1412    Order Status: Completed Specimen: Tissue from Hip, Right Updated: 04/11/24 1028     Anaerobe Culture No Anaerobes Isolated    Tissue Culture - Aerobic [[id number]] Collected: 04/08/24 1412    Order Status: Completed Specimen: Tissue from Hip, Right Updated: 04/11/24 1028     CULTURE, TISSUE- AEROBIC ([facility]) No Growth At 72 Hours    Anaerobic Culture [[id number]] Collected: 04/08/24 1412    Order Status: Completed Specimen: Tissue from Hip, Right Updated: 04/11/24 1014     Anaerobe Culture No Anaerobes Isolated    Anaerobic Culture [[id number]] Collected: 04/08/24 1411    Order Status: Completed Specimen: Tissue from Hip, Right Updated: 04/11/24 1014     Anaerobe  Culture No Anaerobes Isolated    Blood Culture [[id number]]  (Normal) Collected: 04/09/24 1300    Order Status: Completed Specimen: Blood from Antecubital, Right Updated: 04/10/24 1901     CULTURE, BLOOD ([facility]) No Growth At 24 Hours    Blood Culture [[id number]]  (Normal) Collected: 04/09/24 1311    Order Status: Completed Specimen: Blood from Hand, Right Updated: 04/10/24 1901     CULTURE, BLOOD ([facility]) No Growth At 24 Hours    Gram Stain [[id number]] Collected: 04/08/24 1411    Order Status: Completed Specimen: Tissue from Hip, Right Updated: 04/09/24 0820     GRAM STAIN No WBCs, No bacteria seen    Gram Stain [[id number]] Collected: 04/08/24 1412    Order Status: Completed Specimen: Tissue from Hip, Right Updated: 04/09/24 0819     GRAM STAIN No WBCs, No bacteria seen    Gram Stain [[id number]] Collected: 04/08/24 1412    Order Status: Completed Specimen: Tissue from Hip, Right Updated: 04/09/24 0819     GRAM STAIN Rare WBC observed      No bacteria seen

## 2024-04-11 NOTE — PLAN OF CARE
Problem: Physical Therapy  Goal: Physical Therapy Goal  Description: Pt will improve functional independence by performing:    Bed mobility: SBA  Sit to stand: SBA with rolling walker   Bed to chair t/f: SBA with Stand Step  with rolling walker  Ambulation x 200'  with SBA with rolling walker   1 Step (Curb): Min A  with rolling walker MET  3 Steps: Min A  with B HR MET  Independent with total hip HEP   4/11/2024 1708 by Piper Rust, PT  Outcome: Ongoing, Progressing

## 2024-04-11 NOTE — PROGRESS NOTES
No acute events overnight.  Pain controlled.  Resting in bed. Ambulated well with PT yesterday.    Vital Signs  Temp: 98.1 °F (36.7 °C)  Temp Source: Oral  Pulse: 88  Heart Rate Source: Monitor  Resp: 20  SpO2: 95 %  Pulse Oximetry Type: Intermittent  Flow (L/min): 10  Oxygen Concentration (%): 80  Device (Oxygen Therapy): room air  BP: 112/68  BP Location: Left arm  BP Method: Automatic  Patient Position: Lying  Arousal Level: arouses to voice  Height and Weight  Height: 6' (182.9 cm)  Height Method: Stated  Weight: (!) 149.2 kg (329 lb)  Weight Method: Bed Scale  BSA (Calculated - sq m): 2.3 sq meters  BMI (Calculated): 44.6  Weight in (lb) to have BMI = 25: 183.9]    +FHL/EHL  BCR distally  Dressing c/d/i  SILT distally    Recent Lab Results  (Last 5 results in the past 24 hours)        04/11/24  0511   04/11/24  0419   04/10/24  2027   04/10/24  1259   04/10/24  1144        Immature Platelet Fraction   4.2             Anion Gap   7.0             BUN   14.1             BUN/CREAT RATIO   15             Calcium   9.0             Chloride   108             CO2   22             Creatinine   0.95             eGFR   >60             Glucose   107             Hematocrit   26.8             Hemoglobin   8.5             MCH   28.5             MCHC   31.7             MCV   89.9             MPV   10.9             nRBC   0.0             Platelet Count   132             POCT Glucose 114     133     145       Potassium   3.6             RBC   2.98             RDW   16.0             Sodium   137             Vancomycin-Trough       16.9         WBC   5.24                                    A/P:  Status post revision TKA  Pain controlled  Overall patient doing well.  Therapy for mobility and ambulation.  DVT PPx  F/u ID recs  CKD - stable/improved

## 2024-04-11 NOTE — PLAN OF CARE
Problem: Adult Inpatient Plan of Care  Goal: Plan of Care Review  Outcome: Ongoing, Progressing  Flowsheets (Taken 4/10/2024 2149)  Plan of Care Reviewed With: patient  Goal: Optimal Comfort and Wellbeing  Outcome: Ongoing, Progressing  Intervention: Monitor Pain and Promote Comfort  Flowsheets (Taken 4/10/2024 2149)  Pain Management Interventions:   medication offered   position adjusted   relaxation techniques promoted     Problem: Diabetes Comorbidity  Goal: Blood Glucose Level Within Targeted Range  Outcome: Ongoing, Progressing  Intervention: Monitor and Manage Glycemia  Flowsheets (Taken 4/10/2024 2149)  Glycemic Management: blood glucose monitored     Problem: Fall Injury Risk  Goal: Absence of Fall and Fall-Related Injury  Outcome: Ongoing, Progressing  Intervention: Identify and Manage Contributors  Flowsheets (Taken 4/10/2024 2149)  Self-Care Promotion:   independence encouraged   safe use of adaptive equipment encouraged  Medication Review/Management: medications reviewed

## 2024-04-11 NOTE — PT/OT/SLP PROGRESS
Physical Therapy Treatment    Patient Name:  Chris Mendoza II   MRN:  [id number]    Recommendations:     Discharge Recommendations: Low Intensity Therapy  Discharge Equipment Recommendations: walker, rolling  Barriers to discharge: None    Assessment:     Chris Mendoza II is a 78 y.o. male admitted with a medical diagnosis of Failed total hip arthroplasty, initial encounter.  He presents with the following impairments/functional limitations: weakness, impaired endurance, impaired functional mobility, decreased lower extremity function, pain, decreased ROM, edema, orthopedic precautions .    Rehab Prognosis: Good; patient would benefit from acute skilled PT services to address these deficits and reach maximum level of function.    Recent Surgery: Procedure(s) (LRB):  REVISION, TOTAL ARTHROPLASTY, HIP - revision R COLLIN with abx spacer (Right) 3 Days Post-Op    Plan:     During this hospitalization, patient to be seen BID to address the identified rehab impairments via gait training, therapeutic activities, therapeutic exercises and progress toward the following goals:    Plan of Care Expires:  04/15/24    Subjective     Chief Complaint: R hip pain  Patient/Family Comments/goals:   Pain/Comfort:  Location - Side 1: Right  Location 1: hip  Pain Addressed 1: Pre-medicate for activity, Reposition, Distraction, Cessation of Activity      Objective:     Communicated with nurse prior to session.  Patient found up in chair with peripheral IV, philippe catheter, wound vac upon PT entry to room.     General Precautions: Standard, fall  Orthopedic Precautions: RLE posterior precautions, RLE partial weight bearing  Braces:    Respiratory Status: Room air     Functional Mobility:  Transfers:     Sit to Stand:  contact guard assistance with rolling walker  Gait: Pt ambulated 200 ft w rw and SBA, using step through gait pattern at slow pace        Treatment & Education:  Pt edu on total hip precautions, WB status and importance of  frequent mobility  Pt completed COLLIN therex x10 AAROM    Patient left up in chair with all lines intact, call button in reach, and nurse notified..    GOALS:   Multidisciplinary Problems       Physical Therapy Goals          Problem: Physical Therapy    Goal Priority Disciplines Outcome Goal Variances Interventions   Physical Therapy Goal     PT, PT/OT Ongoing, Progressing     Description: Pt will improve functional independence by performing:    Bed mobility: SBA  Sit to stand: SBA with rolling walker   Bed to chair t/f: SBA with Stand Step  with rolling walker  Ambulation x 200'  with SBA with rolling walker   1 Step (Curb): Min A  with rolling walker MET  3 Steps: Min A  with B HR MET  Independent with total hip HEP                        Time Tracking:     PT Received On:    PT Start Time: 1429     PT Stop Time: 1445  PT Total Time (min): 16 min     Billable Minutes: Gait Training 16    Treatment Type: Treatment  PT/PTA: PT     Number of PTA visits since last PT visit: 0     04/11/2024

## 2024-04-11 NOTE — PT/OT/SLP PROGRESS
"Occupational Therapy      Patient Name:  Chris Ramseyr II   MRN:  [id number]    Patient not seen this afternoon secondary to unwillingness to participate with occupational therapy; declined functional mobility training, reporting "What else is there to do? Clap our hands if we're happy and we know it?" Will follow-up tomorrow AM.    4/11/2024  "

## 2024-04-11 NOTE — PLAN OF CARE
Discuss hh options. Pt did not remember name of previous hh. Foc obtained.     Called hh referral to [facility].

## 2024-04-12 VITALS
HEART RATE: 94 BPM | TEMPERATURE: 98 F | DIASTOLIC BLOOD PRESSURE: 66 MMHG | RESPIRATION RATE: 20 BRPM | OXYGEN SATURATION: 96 % | WEIGHT: 315 LBS | BODY MASS INDEX: 42.66 KG/M2 | HEIGHT: 72 IN | SYSTOLIC BLOOD PRESSURE: 103 MMHG

## 2024-04-12 PROBLEM — Z96.649 PROSTHETIC HIP INFECTION: Status: ACTIVE | Noted: 2024-04-12

## 2024-04-12 PROBLEM — T84.59XA PROSTHETIC HIP INFECTION: Status: ACTIVE | Noted: 2024-04-12

## 2024-04-12 LAB
ALBUMIN SERPL-MCNC: 2.6 G/DL (ref 3.4–4.8)
BUN SERPL-MCNC: 9.6 MG/DL (ref 8.4–25.7)
CALCIUM SERPL-MCNC: 9.2 MG/DL (ref 8.8–10)
CHLORIDE SERPL-SCNC: 110 MMOL/L (ref 98–107)
CK SERPL-CCNC: 53 U/L (ref 30–200)
CO2 SERPL-SCNC: 22 MMOL/L (ref 23–31)
CREAT SERPL-MCNC: 0.81 MG/DL (ref 0.73–1.18)
ERYTHROCYTE [DISTWIDTH] IN BLOOD BY AUTOMATED COUNT: 16.3 % (ref 11.5–17)
GFR SERPLBLD CREATININE-BSD FMLA CKD-EPI: >60 MLS/MIN/1.73/M2
GLUCOSE SERPL-MCNC: 85 MG/DL (ref 82–115)
HCT VFR BLD AUTO: 26.7 % (ref 42–52)
HGB BLD-MCNC: 8.3 G/DL (ref 14–18)
MCH RBC QN AUTO: 28.1 PG (ref 27–31)
MCHC RBC AUTO-ENTMCNC: 31.1 G/DL (ref 33–36)
MCV RBC AUTO: 90.5 FL (ref 80–94)
NRBC BLD AUTO-RTO: 0 %
PHOSPHATE SERPL-MCNC: 4 MG/DL (ref 2.3–4.7)
PLATELET # BLD AUTO: 146 X10(3)/MCL (ref 130–400)
PMV BLD AUTO: 11 FL (ref 7.4–10.4)
POCT GLUCOSE: 159 MG/DL (ref 70–110)
POTASSIUM SERPL-SCNC: 3.3 MMOL/L (ref 3.5–5.1)
RBC # BLD AUTO: 2.95 X10(6)/MCL (ref 4.7–6.1)
SODIUM SERPL-SCNC: 140 MMOL/L (ref 136–145)
WBC # SPEC AUTO: 4.62 X10(3)/MCL (ref 4.5–11.5)

## 2024-04-12 PROCEDURE — 63600175 PHARM REV CODE 636 W HCPCS: Performed by: GENERAL PRACTICE

## 2024-04-12 PROCEDURE — A4216 STERILE WATER/SALINE, 10 ML: HCPCS | Performed by: INTERNAL MEDICINE

## 2024-04-12 PROCEDURE — 82550 ASSAY OF CK (CPK): CPT | Performed by: GENERAL PRACTICE

## 2024-04-12 PROCEDURE — 25000003 PHARM REV CODE 250: Performed by: INTERNAL MEDICINE

## 2024-04-12 PROCEDURE — 25000003 PHARM REV CODE 250: Performed by: GENERAL PRACTICE

## 2024-04-12 PROCEDURE — 97530 THERAPEUTIC ACTIVITIES: CPT

## 2024-04-12 PROCEDURE — 25000003 PHARM REV CODE 250: Performed by: NURSE PRACTITIONER

## 2024-04-12 PROCEDURE — 80069 RENAL FUNCTION PANEL: CPT | Performed by: INTERNAL MEDICINE

## 2024-04-12 PROCEDURE — 25000003 PHARM REV CODE 250: Performed by: ORTHOPAEDIC SURGERY

## 2024-04-12 PROCEDURE — 85027 COMPLETE CBC AUTOMATED: CPT | Performed by: INTERNAL MEDICINE

## 2024-04-12 PROCEDURE — 99223 1ST HOSP IP/OBS HIGH 75: CPT | Mod: ,,, | Performed by: GENERAL PRACTICE

## 2024-04-12 RX ORDER — HYDROCODONE BITARTRATE AND ACETAMINOPHEN 5; 325 MG/1; MG/1
1 TABLET ORAL EVERY 6 HOURS PRN
Qty: 28 TABLET | Refills: 0 | Status: SHIPPED | OUTPATIENT
Start: 2024-04-12 | End: 2024-04-19

## 2024-04-12 RX ORDER — SODIUM CHLORIDE 0.9 % (FLUSH) 0.9 %
10 SYRINGE (ML) INJECTION
Start: 2024-04-12

## 2024-04-12 RX ORDER — SODIUM CHLORIDE 0.9 % (FLUSH) 0.9 %
10 SYRINGE (ML) INJECTION EVERY 6 HOURS
Start: 2024-04-12

## 2024-04-12 RX ADMIN — FINASTERIDE 5 MG: 5 TABLET, FILM COATED ORAL at 09:04

## 2024-04-12 RX ADMIN — DAPTOMYCIN 1195 MG: 500 INJECTION, POWDER, LYOPHILIZED, FOR SOLUTION INTRAVENOUS at 01:04

## 2024-04-12 RX ADMIN — PIOGLITAZONE 30 MG: 15 TABLET ORAL at 09:04

## 2024-04-12 RX ADMIN — FAMOTIDINE 20 MG: 20 TABLET ORAL at 09:04

## 2024-04-12 RX ADMIN — Medication 10 ML: at 05:04

## 2024-04-12 RX ADMIN — ACETAMINOPHEN 500 MG: 500 TABLET ORAL at 04:04

## 2024-04-12 RX ADMIN — TRAMADOL HYDROCHLORIDE 50 MG: 50 TABLET, COATED ORAL at 04:04

## 2024-04-12 RX ADMIN — DOXAZOSIN 4 MG: 4 TABLET ORAL at 09:04

## 2024-04-12 RX ADMIN — ACETAMINOPHEN 500 MG: 500 TABLET ORAL at 11:04

## 2024-04-12 RX ADMIN — ACETAMINOPHEN 500 MG: 500 TABLET ORAL at 09:04

## 2024-04-12 RX ADMIN — VANCOMYCIN HYDROCHLORIDE 1000 MG: 1 INJECTION, POWDER, LYOPHILIZED, FOR SOLUTION INTRAVENOUS at 09:04

## 2024-04-12 RX ADMIN — DOCUSATE SODIUM 200 MG: 100 CAPSULE, LIQUID FILLED ORAL at 09:04

## 2024-04-12 RX ADMIN — APIXABAN 5 MG: 5 TABLET, FILM COATED ORAL at 09:04

## 2024-04-12 RX ADMIN — ACETAMINOPHEN 500 MG: 500 TABLET ORAL at 12:04

## 2024-04-12 RX ADMIN — SENNOSIDES AND DOCUSATE SODIUM 2 TABLET: 8.6; 5 TABLET ORAL at 09:04

## 2024-04-12 NOTE — PT/OT/SLP PROGRESS
"Physical Therapy      Patient Name:  Chris Mendoza II   MRN:  [id number]    Patient not seen today secondary to Patient unwilling to participate. PT attempted to see pt however, pt refused. Despite max encouragement and education on importance of PT services and continued therX pt continued to state "I'm not going to walk today. I'm going home."  Notified RN and NP of PTs findings.     "

## 2024-04-12 NOTE — PROGRESS NOTES
[facility] ORTHOPAEDIC  [facility] Medicine Progress Note      Patient Name: Chris Mendoza II  MRN: [id number]  Admission Date: 4/8/2024   Length of Stay: 4  Attending Physician: Irwin Art MD  Primary Care Provider: Shobha Mcwilliams FNP  Face-to-Face encounter date: 04/12/2024    Code Status: Prior        Chief Complaint:   No chief complaint on file.        HPI:   Chris Mendoza II is a 78 y.o. male with  has a past medical history of Arthritis, Atrial fibrillation, Bladder cancer, CKD (chronic kidney disease), Diabetes mellitus, type 2, Gout, unspecified, Hyperlipidemia, Hypertension, Restless leg syndrome, Seasonal allergies, Sleep apnea, unspecified, and Thyroid disease..admitted under Dr. Luna on 4/8/2024 with the diagnosis of right hip infection/revision COLLIN 4/8.  Hospital Medicine team was consulted for medical management   pt resting in bed, ambulated with therapy x3 today, feels good, pain controlled.  Urine is a little concentrated, no swelling trace normal for pt, urine output borderline low currently on ivfs 75cc/h.  No f/c     Overview/Hospital Course:  No notes on file       Interval Hx:   The pt has no new c/o. Case d/w CM. DC planning d/w patient.    Review of Systems   All other systems reviewed and are negative.      Objective/physical exam:  General: In no acute distress, afebrile  Chest: Clear to auscultation bilaterally  Heart: RRR, +S1, S2, no appreciable murmur, RUE picc  Abdomen: Soft, nontender, BS +  MSK: s/p right hip surgery.   Neurologic: Alert and oriented x4  Psych: Calm, cooperative    VITAL SIGNS: 24 HRS MIN & MAX LAST   Temp  Min: 97.9 °F (36.6 °C)  Max: 98.2 °F (36.8 °C) 98.1 °F (36.7 °C)   BP  Min: 112/72  Max: 123/70 118/81   Pulse  Min: 72  Max: 99  90   Resp  Min: 17  Max: 20 20   SpO2  Min: 94 %  Max: 98 % 96 %       Recent Labs   Lab 04/10/24  0505 04/11/24  0419 04/12/24  0426   WBC 5.61 5.24 4.62   RBC 3.23* 2.98* 2.95*   HGB  9.1* 8.5* 8.3*   HCT 28.8* 26.8* 26.7*   MCV 89.2 89.9 90.5   MCH 28.2 28.5 28.1   MCHC 31.6* 31.7* 31.1*   RDW 16.1 16.0 16.3   * 132 146   MPV 10.8* 10.9* 11.0*         Recent Labs   Lab 04/10/24  0505 04/11/24  0419 04/12/24  0426    137 140   K 3.6 3.6 3.3*   CO2 18* 22* 22*   BUN 20.5 14.1 9.6   CREATININE 1.39* 0.95 0.81   CALCIUM 8.8 9.0 9.2   ALBUMIN  --   --  2.6*          Microbiology Results (last 7 days)       Procedure Component Value Units Date/Time    Tissue Culture - Aerobic [[id number]] Collected: 04/08/24 1412    Order Status: Completed Specimen: Tissue from Hip, Right Updated: 04/12/24 1224     CULTURE, TISSUE- AEROBIC ([facility]) No growth at 4 days    Tissue Culture - Aerobic [[id number]] Collected: 04/08/24 1411    Order Status: Completed Specimen: Tissue from Hip, Right Updated: 04/12/24 1224     CULTURE, TISSUE- AEROBIC ([facility]) No growth at 4 days    Tissue Culture - Aerobic [[id number]]  (Abnormal)  (Susceptibility) Collected: 04/08/24 1412    Order Status: Completed Specimen: Tissue from Hip, Right Updated: 04/12/24 1157     CULTURE, TISSUE- AEROBIC ([facility]) Rare Methicillin resistant Staphylococcus aureus    Blood Culture [[id number]]  (Normal) Collected: 04/09/24 1311    Order Status: Completed Specimen: Blood from Hand, Right Updated: 04/11/24 1900     CULTURE, BLOOD ([facility]) No Growth At 48 Hours    Blood Culture [[id number]]  (Normal) Collected: 04/09/24 1300    Order Status: Completed Specimen: Blood from Antecubital, Right Updated: 04/11/24 1900     CULTURE, BLOOD ([facility]) No Growth At 48 Hours    Anaerobic Culture [[id number]] Collected: 04/08/24 1412    Order Status: Completed Specimen: Tissue from Hip, Right Updated: 04/11/24 1028     Anaerobe Culture No Anaerobes Isolated    Anaerobic Culture [[id number]] Collected: 04/08/24 1412    Order Status: Completed Specimen: Tissue from Hip, Right Updated: 04/11/24 1014     Anaerobe Culture No Anaerobes Isolated    Anaerobic Culture  [[id number]] Collected: 04/08/24 1411    Order Status: Completed Specimen: Tissue from Hip, Right Updated: 04/11/24 1014     Anaerobe Culture No Anaerobes Isolated    Gram Stain [[id number]] Collected: 04/08/24 1411    Order Status: Completed Specimen: Tissue from Hip, Right Updated: 04/09/24 0820     GRAM STAIN No WBCs, No bacteria seen    Gram Stain [[id number]] Collected: 04/08/24 1412    Order Status: Completed Specimen: Tissue from Hip, Right Updated: 04/09/24 0819     GRAM STAIN No WBCs, No bacteria seen    Gram Stain [[id number]] Collected: 04/08/24 1412    Order Status: Completed Specimen: Tissue from Hip, Right Updated: 04/09/24 0819     GRAM STAIN Rare WBC observed      No bacteria seen             Radiology:  X-Ray Chest 1 View for Line/Tube Placement  Narrative: EXAMINATION:  XR CHEST 1 VIEW FOR LINE/TUBE PLACEMENT    CLINICAL HISTORY:  PICC;    TECHNIQUE:  Single frontal view of the chest was performed.    COMPARISON:  03/26/2024    FINDINGS:  LINES AND TUBES: Right upper extremity PICC tip projects over the SVC.    MEDIASTINUM AND JEANNIE: The cardiac silhouette is normal.    LUNGS: No lobar consolidation. No edema.    PLEURA:No pleural effusion. No pneumothorax.    OTHER: No acute osseous abnormality.  Impression: No acute cardiopulmonary abnormality.    Electronically signed by: Mindi Haider  Date:    04/11/2024  Time:    16:31      Scheduled Med:   acetaminophen  500 mg Oral Q4H    apixaban  5 mg Oral BID    DAPTOmycin (CUBICIN) IV (PEDS and ADULTS)  8 mg/kg Intravenous Q24H    docusate sodium  200 mg Oral Daily    doxazosin  4 mg Oral Daily    famotidine  20 mg Oral BID    finasteride  5 mg Oral Daily    gabapentin  300 mg Oral QHS    pioglitazone  30 mg Oral Daily    polyethylene glycol  17 g Oral QHS    senna-docusate 8.6-50 mg  2 tablet Oral BID    sodium chloride 0.9%  10 mL Intravenous Q6H        Continuous Infusions:   sodium chloride 0.9% 75 mL/hr at 04/08/24 1703        PRN  Meds:  aluminum-magnesium hydroxide-simethicone, dextrose 10%, dextrose 10%, glucagon (human recombinant), glucose, glucose, insulin aspart U-100, lactulose, melatonin, methocarbamoL, ondansetron, Flushing PICC/Midline Protocol **AND** sodium chloride 0.9% **AND** sodium chloride 0.9%, traMADoL     Nutrition Status:      Assessment/Plan:    S/p Revision right COLLIN 4/8, hx MRSA in hip 2/15/24, 4/8 surgical cx rare MRSA  Hx afib, htn, hld, dm 2, ckd  Post op anemia     Plan  Case d/w ID  Cont Daptomycin, for 6 wk course from 4/8/24  OK to dc from IM standpoint      All diagnosis and differential diagnosis have been reviewed; assessment and plan has been documented; I have personally reviewed the labs and test results that are presently available; I have reviewed the patients medication list; I have reviewed the consulting providers response and recommendations. I have reviewed or attempted to review medical records based upon their availability      _____________________________________________________________________            Irwin Art MD   04/12/2024

## 2024-04-12 NOTE — PROGRESS NOTES
Infectious Disease  Consult Note    Patient Name: Chris Mendoza II   MRN: [id number]   Admission Date: 4/8/2024   Hospital Length of Stay: 4 days  Attending Physician: Jose Luna MD   Primary Care Provider: Shobha Mcwilliams FNP     Isolation Status: No active isolations       Assessment/Plan:       78-year-old male patient known to have past medical history significant for DM 2, obesity, gout, prior COLLIN, complicated by MRSA infection s/p revision 05/11/2020, completed 6 weeks of IV vancomycin which was complicated by DAGOBERTO, transitioned to daptomycin, completed 6 weeks of antibiotics and patient did fairly well, since then he has been doing relatively okay.  However in 02/2024 he started having worsening pain in the right hip, followed up with his orthopedic surgeon, had aspiration done 02/05/2024, this grew MRSA, patient was given course of doxycycline however no significant improvement, presented to the hospital for revision of COLLIN.  He underwent revision on 04/08/2024 with revision of the right total hip arthroplasty both components, placement of antibiotic delivery device, complete synovectomy of the right hip.  Intraoperative cultures growing MRSA again.  ID consulted for assistance in management.    Septic prosthetic right hip   MRSA infection   Prior MRSA infection of same hip  Dm 2  Obesity     Plan:  -discontinue vancomycin given patient's previous history of DAGOBERTO while on this medicine   -start daptomycin 8 milligrams/kilograms IV Q 24 hours  -will need 6 weeks day of surgery 04/08/2024   -anticipated end date 05/20/2024   -after completion of IV antibiotics, would recommend a p.o. tail for at least 2-3 months depending on surgical plans and patient's clinical improvement  -add on CPK to current labs   -Please monitor weekly CBC, CMP, ESR, CRP and CPK Fax results to my office at [phone]  -will set up follow-up with us in the office in 2-3 weeks  -discussed with patient, wife, nursing, primary  team attending      Thank you for your consult. ID will continue following. Please contact us with any questions or concerns.    Antibiotics:  Cefazolin 04/08 - 04/09  Vancomycin 04/08 - 04/12/2024    Portions of this note dictated using EMR integrated voice recognition software, and may be subject to voice recognition errors not corrected at proofreading. Please contact writer for clarification if needed.    Subjective:     Principal Problem: Failed total hip arthroplasty, initial encounter     HPI:   78-year-old male patient known to have past medical history significant for DM 2, obesity, gout, prior COLLIN, complicated by MRSA infection s/p revision 05/11/2020, completed 6 weeks of IV vancomycin which was complicated by DAGOBERTO, transitioned to daptomycin, completed 6 weeks of antibiotics and patient did fairly well, since then he has been doing relatively okay.  However in 02/2024 he started having worsening pain in the right hip, followed up with his orthopedic surgeon, had aspiration done 02/05/2024, this grew MRSA, patient was given course of doxycycline however no significant improvement, presented to the hospital for revision of COLLIN.  He underwent revision on 04/08/2024 with revision of the right total hip arthroplasty both components, placement of antibiotic delivery device, complete synovectomy of the right hip.  Intraoperative cultures growing MRSA again.  ID consulted for assistance in management.    Past Medical History:   Diagnosis Date    Arthritis     Atrial fibrillation     Bladder cancer     CKD (chronic kidney disease)     Diabetes mellitus, type 2     Gout, unspecified     Hyperlipidemia     Hypertension     Restless leg syndrome     Seasonal allergies     Sleep apnea, unspecified     Thyroid disease         Past Surgical History:   Procedure Laterality Date    ARTHROGRAM Right 02/15/2024    Procedure: ARTHROGRAM  aspiration right hip with anesthesia #1;  Surgeon: Jose Luna MD;  Location: [facility]  OR;  Service: Orthopedics;  Laterality: Right;  aspiration right hip with anesthesia    BLADDER SURGERY  2009    removed Ca    CARDIAC CATHETERIZATION      CARDIOVERSION      CATARACT EXTRACTION W/  INTRAOCULAR LENS IMPLANT Bilateral     JOINT REPLACEMENT Right     Total Hip    MULTIPLE TOOTH EXTRACTIONS      VEIN SURGERY         Review of patient's allergies indicates:   Allergen Reactions    Clindamycin Rash    Levofloxacin Hives        Family History       Problem Relation (Age of Onset)    Cancer Father    Coronary artery disease Mother    Heart disease Father    Thyroid disease Sister             Social History     Socioeconomic History    Marital status:    Tobacco Use    Smoking status: Never    Smokeless tobacco: Never   Substance and Sexual Activity    Alcohol use: Not Currently    Drug use: Not Currently     Types: Marijuana    Sexual activity: Yes     Partners: Female        Lines/Drains/Airways       Peripherally Inserted Central Catheter Line  Duration             PICC Double Lumen 04/11/24 1559 right basilic <1 day              Drain  Duration                  Drain/Device  04/08/24 1535 Right anterior;proximal thigh other (see comments) 3 days                     Medication:  Medications Prior to Admission   Medication Sig    allopurinoL (ZYLOPRIM) 300 MG tablet Take 1 tablet by mouth twice daily (Patient taking differently: Take 150 mg by mouth Daily.)    doxazosin (CARDURA) 4 MG tablet Take 1 tablet by mouth once daily (Patient taking differently: Take 4 mg by mouth once daily.)    doxycycline (VIBRA-TABS) 100 MG tablet Take 1 tablet (100 mg total) by mouth 2 (two) times daily.    doxycycline (VIBRAMYCIN) 100 MG Cap Take 1 capsule (100 mg total) by mouth every 12 (twelve) hours.    ELIQUIS 5 mg Tab Take 5 mg by mouth 2 (two) times daily.    finasteride (PROSCAR) 5 mg tablet Take 1 tablet by mouth once daily (Patient taking differently: Take 5 mg by mouth once daily.)    glipiZIDE (GLUCOTROL)  10 MG TR24 Take 1 tablet (10 mg total) by mouth daily with breakfast.    pioglitazone (ACTOS) 30 MG tablet Take 1 tablet (30 mg total) by mouth once daily.    rosuvastatin (CRESTOR) 20 MG tablet Take 20 mg by mouth every evening.    semaglutide (OZEMPIC) 0.25 mg or 0.5 mg(2 mg/1.5 mL) pen injector Inject into the skin every 7 days.    [DISCONTINUED] HYDROcodone-acetaminophen (NORCO) 5-325 mg per tablet Take 1 tablet by mouth every 6 (six) hours as needed for Pain. (Patient not taking: Reported on 3/26/2024)    loratadine (CLARITIN) 10 mg tablet Take 10 mg by mouth daily as needed.    [DISCONTINUED] HYDROcodone-acetaminophen (NORCO) 5-325 mg per tablet Take 1 tablet by mouth every 6 (six) hours as needed for Pain. (Patient not taking: Reported on 3/26/2024)    [DISCONTINUED] HYDROcodone-acetaminophen (NORCO) 5-325 mg per tablet Take 1 tablet by mouth every 6 (six) hours as needed for Pain.          Antimicrobials:  Antibiotics (From admission, onward)      Start     Stop Route Frequency Ordered    04/12/24 1300  DAPTOmycin (CUBICIN) 1,195 mg in sodium chloride 0.9% SolP 50 mL IVPB         -- IV Every 24 hours (non-standard times) 04/12/24 1155    04/08/24 1231  vancomycin (VANCOCIN) 1,000 mg injection        Note to Pharmacy: Created by cabinet override    04/09/24 0044   04/08/24 1231    04/08/24 1231  gentamicin (GARAMYCIN) 40 mg/mL injection        Note to Pharmacy: Created by cabinet override    04/09/24 0044   04/08/24 1231             Antifungals (From admission, onward)      None            Antivirals (From admission, onward)      None               Review of Systems   Review of Systems   All other systems reviewed and are negative.        Objective:     Vital Signs (Most Recent):  Temp: 98.1 °F (36.7 °C) (04/12/24 1114)  Pulse: 90 (04/12/24 1114)  Resp: 18 (04/12/24 0800)  BP: 118/81 (04/12/24 1114)  SpO2: 96 % (04/12/24 1114)  Vital Signs (24h Range):  Temp:  [97.9 °F (36.6 °C)-98.2 °F (36.8 °C)] 98.1 °F  (36.7 °C)  Pulse:  [72-99] 90  Resp:  [17-18] 18  SpO2:  [94 %-98 %] 96 %  BP: (112-123)/(59-81) 118/81      Weight:   Wt Readings from Last 1 Encounters:   04/09/24 (!) 149.2 kg (329 lb)      Body mass index is Body mass index is 44.62 kg/m².     Estimated Creatinine Clearance: Estimated Creatinine Clearance: 114.7 mL/min (based on SCr of 0.81 mg/dL).     Physical Exam  Physical Exam  Constitutional:       Appearance: Normal appearance. He is obese.   HENT:      Head: Normocephalic and atraumatic.      Mouth/Throat:      Pharynx: No oropharyngeal exudate or posterior oropharyngeal erythema.   Eyes:      Extraocular Movements: Extraocular movements intact.      Pupils: Pupils are equal, round, and reactive to light.   Cardiovascular:      Rate and Rhythm: Normal rate and regular rhythm.      Heart sounds: No murmur heard.  Pulmonary:      Effort: No respiratory distress.      Breath sounds: No wheezing, rhonchi or rales.   Abdominal:      General: Bowel sounds are normal. There is no distension.      Palpations: Abdomen is soft.      Tenderness: There is no abdominal tenderness. There is no right CVA tenderness or left CVA tenderness.   Musculoskeletal:         General: No swelling or tenderness.      Cervical back: Neck supple. No rigidity or tenderness.      Comments: R hip in post op dressing    Lymphadenopathy:      Cervical: No cervical adenopathy.   Skin:     Findings: No lesion or rash.   Neurological:      General: No focal deficit present.      Mental Status: He is alert and oriented to person, place, and time. Mental status is at baseline.      Cranial Nerves: No cranial nerve deficit.      Motor: No weakness.   Psychiatric:         Mood and Affect: Mood normal.         Behavior: Behavior normal.           Significant Labs: CBC:   Recent Labs   Lab 04/11/24 0419 04/12/24 0426   WBC 5.24 4.62   HGB 8.5* 8.3*   HCT 26.8* 26.7*    146     CMP:   Recent Labs   Lab 04/11/24 0419 04/12/24  0426   NA  137 140   K 3.6 3.3*   CO2 22* 22*   BUN 14.1 9.6   CREATININE 0.95 0.81   CALCIUM 9.0 9.2   ALBUMIN  --  2.6*         Microbiology Results (last 7 days)       Procedure Component Value Units Date/Time    Blood Culture [[id number]]  (Normal) Collected: 04/09/24 1311    Order Status: Completed Specimen: Blood from Hand, Right Updated: 04/11/24 1900     CULTURE, BLOOD ([facility]) No Growth At 48 Hours    Blood Culture [[id number]]  (Normal) Collected: 04/09/24 1300    Order Status: Completed Specimen: Blood from Antecubital, Right Updated: 04/11/24 1900     CULTURE, BLOOD ([facility]) No Growth At 48 Hours    Tissue Culture - Aerobic [[id number]]  (Abnormal) Collected: 04/08/24 1412    Order Status: Completed Specimen: Tissue from Hip, Right Updated: 04/11/24 1155     CULTURE, TISSUE- AEROBIC ([facility]) Rare Staphylococcus aureus    Tissue Culture - Aerobic [[id number]] Collected: 04/08/24 1411    Order Status: Completed Specimen: Tissue from Hip, Right Updated: 04/11/24 1028     CULTURE, TISSUE- AEROBIC ([facility]) No Growth At 72 Hours    Anaerobic Culture [[id number]] Collected: 04/08/24 1412    Order Status: Completed Specimen: Tissue from Hip, Right Updated: 04/11/24 1028     Anaerobe Culture No Anaerobes Isolated    Tissue Culture - Aerobic [[id number]] Collected: 04/08/24 1412    Order Status: Completed Specimen: Tissue from Hip, Right Updated: 04/11/24 1028     CULTURE, TISSUE- AEROBIC ([facility]) No Growth At 72 Hours    Anaerobic Culture [[id number]] Collected: 04/08/24 1412    Order Status: Completed Specimen: Tissue from Hip, Right Updated: 04/11/24 1014     Anaerobe Culture No Anaerobes Isolated    Anaerobic Culture [[id number]] Collected: 04/08/24 1411    Order Status: Completed Specimen: Tissue from Hip, Right Updated: 04/11/24 1014     Anaerobe Culture No Anaerobes Isolated    Gram Stain [[id number]] Collected: 04/08/24 1411    Order Status: Completed Specimen: Tissue from Hip, Right Updated: 04/09/24 0820     GRAM STAIN No WBCs,  No bacteria seen    Gram Stain [[id number]] Collected: 04/08/24 1412    Order Status: Completed Specimen: Tissue from Hip, Right Updated: 04/09/24 0819     GRAM STAIN No WBCs, No bacteria seen    Gram Stain [[id number]] Collected: 04/08/24 1412    Order Status: Completed Specimen: Tissue from Hip, Right Updated: 04/09/24 0819     GRAM STAIN Rare WBC observed      No bacteria seen             Significant Imaging: I have reviewed all pertinent imaging results/findings within the past 24 hours.      Humberto Jackson MD  Infectious Disease  [facility]

## 2024-04-12 NOTE — PT/OT/SLP PROGRESS
Physical Therapy      Patient Name:  Chris Ramseyr II   MRN:  [id number]    Patient not seen today secondary to unwilling to participate . Pt stated he is ready to go home and there is no point in doing therapy anymore. Will follow-up when schedule allows. 10 min un billable conference charge.

## 2024-04-12 NOTE — PLAN OF CARE
Financial arrangements made with [employer] -- Daptomycin $200 +/ week.     Luis Alberto ( [employer]) on site teaching.     Plan dc today.     Faxed updated orders & AVS to [facility]

## 2024-04-12 NOTE — PT/OT/SLP PROGRESS
Occupational Therapy   Treatment    Name: Chris Mendoza II  MRN: [id number]  Admitting Diagnosis:  Failed total hip arthroplasty, initial encounter  4 Days Post-Op    Recommendations:     Discharge Recommendations: Low Intensity Therapy  Discharge Equipment Recommendations:  walker, rolling  Barriers to discharge:  None    Assessment:     Chris Mendoza II is a 78 y.o. male with a medical diagnosis of Failed total hip arthroplasty, initial encounter.  Performance deficits affecting function are weakness, impaired endurance, impaired self care skills, impaired functional mobility, gait instability, impaired balance, decreased lower extremity function, decreased safety awareness, decreased ROM, impaired joint extensibility, orthopedic precautions, impaired muscle length.     Rehab Prognosis:  Fair; patient would benefit from acute skilled OT services to address these deficits and reach maximum level of function.       Plan:     Patient to be seen daily (BID) to address the above listed problems via self-care/home management, therapeutic activities, therapeutic exercises  Plan of Care Expires: 04/15/24  Plan of Care Reviewed with: patient    Subjective     Chief Complaint: no new complaints at this time  Patient/Family Comments/goals: to go home  Pain/Comfort:  Pain Rating 1: 0/10    Objective:     Communicated with: NSG prior to session.  Patient found HOB elevated with peripheral IV, wound vac upon OT entry to room.    General Precautions: Standard, fall    Orthopedic Precautions:RLE partial weight bearing, RLE posterior precautions  Braces: N/A  Respiratory Status: Room air     Occupational Performance:     Bed Mobility:    Patient completed Scooting/Bridging with minimum assistance  Patient completed Supine to Sit with maximal assistance     Functional Mobility/Transfers:  Patient completed Sit <> Stand Transfer with stand by assistance  with  rolling walker   Patient completed Bed <> Chair Transfer using Step  Transfer technique with stand by assistance with rolling walker  Functional Mobility: Pt refusing to perform any functional mobility at this time.    Treatment & Education:  Emphasized importance of out-of-bed mobility    Patient left up in chair with all lines intact and call button in reach    GOALS:   Multidisciplinary Problems       Occupational Therapy Goals          Problem: Occupational Therapy    Goal Priority Disciplines Outcome Interventions   Occupational Therapy Goal     OT, PT/OT Ongoing, Not Progressing    Description: Pt will perform LB dressing mod A with AE PRN by d/c.  Pt will perform toileting min A by d/c.  Pt will perform toilet t/f min A by d/c.  Pt will perform shower t/f min assist by d/c. MET  Pt will perform car t/f min assist in adherence to pxns by d/c.                        Time Tracking:     OT Date of Treatment: 04/12/24  OT Start Time: 0942  OT Stop Time: 0953  OT Total Time (min): 11 min    Billable Minutes:Therapeutic Activity 11    OT/CHARLIE: OT          4/12/2024

## 2024-04-12 NOTE — PROGRESS NOTES
No acute events overnight.  Pain controlled.  Resting in bed. Ambulated well with PT.    Vital Signs  Temp: 98.2 °F (36.8 °C)  Temp Source: Oral  Pulse: 99  Heart Rate Source: Monitor  Resp: 18  SpO2: (!) 94 %  Pulse Oximetry Type: Intermittent  Flow (L/min): 10  Oxygen Concentration (%): 80  Device (Oxygen Therapy): room air  BP: 122/70  BP Location: Left arm  BP Method: Automatic  Patient Position: Lying  Arousal Level: arouses to voice  Height and Weight  Height: 6' (182.9 cm)  Height Method: Stated  Weight: (!) 149.2 kg (329 lb)  Weight Method: Bed Scale  BSA (Calculated - sq m): 2.3 sq meters  BMI (Calculated): 44.6  Weight in (lb) to have BMI = 25: 183.9]    +FHL/EHL  BCR distally  Dressing c/d/I & WV intact with good seal  SILT distally    Recent Lab Results         04/12/24  0426   04/11/24  1650   04/11/24  1328   04/11/24  1046        Albumin 2.6             BUN 9.6             Calcium 9.2             Chloride 110             CO2 22             Creatinine 0.81             eGFR >60             Glucose 85             Hematocrit 26.7             Hemoglobin 8.3             MCH 28.1             MCHC 31.1             MCV 90.5             MPV 11.0             nRBC 0.0             Phosphorus Level 4.0             Platelet Count 146             POCT Glucose   125     113       Potassium 3.3             RBC 2.95             RDW 16.3             Sodium 140             Vancomycin-Trough     18.6         WBC 4.62                     A/P:  Status post revision TKA  Pain controlled  Overall patient doing well.  Therapy for mobility and ambulation.  DVT PPx  F/u ID recs- awaiting prior to d/c  CKD - stable/improved

## 2024-04-12 NOTE — PLAN OF CARE
Problem: Adult Inpatient Plan of Care  Goal: Plan of Care Review  4/12/2024 1629 by Johanne Ferraro RN  Outcome: Met  4/12/2024 1105 by Johanne Ferraro RN  Outcome: Ongoing, Progressing  Goal: Patient-Specific Goal (Individualized)  4/12/2024 1629 by Johanne Ferraro RN  Outcome: Met  4/12/2024 1105 by Johanne Ferraro RN  Outcome: Ongoing, Progressing  Goal: Absence of Hospital-Acquired Illness or Injury  4/12/2024 1629 by Johanne Ferraro RN  Outcome: Met  4/12/2024 1105 by Johanne Ferraro RN  Outcome: Ongoing, Progressing  Goal: Optimal Comfort and Wellbeing  4/12/2024 1629 by Johanne Ferraro RN  Outcome: Met  4/12/2024 1105 by Johanne Ferraro RN  Outcome: Ongoing, Progressing  Goal: Readiness for Transition of Care  4/12/2024 1629 by Johanne Ferraro RN  Outcome: Met  4/12/2024 1105 by Johanne Ferraro RN  Outcome: Ongoing, Progressing     Problem: Diabetes Comorbidity  Goal: Blood Glucose Level Within Targeted Range  4/12/2024 1629 by Johanne Ferraro RN  Outcome: Met  4/12/2024 1105 by Johanne Ferraro RN  Outcome: Ongoing, Progressing     Problem: Bariatric Environmental Safety  Goal: Safety Maintained with Care  4/12/2024 1629 by Johanne Ferraro RN  Outcome: Met  4/12/2024 1105 by Johanne Ferraro RN  Outcome: Ongoing, Progressing     Problem: Infection  Goal: Absence of Infection Signs and Symptoms  4/12/2024 1629 by Johanne Ferraro RN  Outcome: Met  4/12/2024 1105 by Johanne Ferraro RN  Outcome: Ongoing, Progressing     Problem: Hypertension Comorbidity  Goal: Blood Pressure in Desired Range  4/12/2024 1629 by Johanne Ferraro RN  Outcome: Met  4/12/2024 1105 by Johanne Ferraro, RN  Outcome: Ongoing, Progressing     Problem: Obstructive Sleep Apnea Risk or Actual Comorbidity Management  Goal: Unobstructed Breathing During  Sleep  4/12/2024 1629 by Johanne Ferraro, RN  Outcome: Met  4/12/2024 1105 by Johanne Ferraro RN  Outcome: Ongoing, Progressing     Problem: Fall Injury Risk  Goal: Absence of Fall and Fall-Related Injury  4/12/2024 1629 by Johanne Ferraro, RN  Outcome: Met  4/12/2024 1105 by Johanne Ferraro RN  Outcome: Ongoing, Progressing

## 2024-04-12 NOTE — NURSING
Pt verbalized understanding of discharge instructions. Time given for questions or concerns. Patient discharged in stable condition via wheelchair to automobile with staff.

## 2024-04-13 LAB
BACTERIA TISS AEROBE CULT: ABNORMAL
BACTERIA TISS AEROBE CULT: NORMAL
BACTERIA TISS AEROBE CULT: NORMAL

## 2024-04-13 PROCEDURE — G0180 MD CERTIFICATION HHA PATIENT: HCPCS | Mod: ,,, | Performed by: ORTHOPAEDIC SURGERY

## 2024-04-14 LAB
BACTERIA BLD CULT: NORMAL
BACTERIA BLD CULT: NORMAL

## 2024-04-15 ENCOUNTER — TELEPHONE (OUTPATIENT)
Dept: FAMILY MEDICINE | Facility: CLINIC | Age: 79
End: 2024-04-15
Payer: MEDICARE

## 2024-04-15 NOTE — TELEPHONE ENCOUNTER
Discharge from hospital after hip replacement on 04/12/2024.  Needs TCC visit and call, okay if TeleMed visit if needed.

## 2024-04-16 NOTE — TELEPHONE ENCOUNTER
Spoke with patient, stated he is doing well. Asked me to schedule with his wife.   Wife stated they cannot schedule at this time, Has PT/OT with home health almost everyday.  Will call us if they can schedule or need anything

## 2024-04-19 NOTE — DISCHARGE SUMMARY
[facility] - Orthopaedics  Discharge Summary      Admit Date: 4/8/2024    Discharge Date and Time: 4/12/2024  4:20 PM    Attending Physician: Elly att. providers found     Reason for Admission: failed total hip arthroplasty; right    Procedures Performed: Procedure(s) (LRB):  REVISION, TOTAL ARTHROPLASTY, HIP - revision R COLLIN with abx spacer (Right)    Hospital Course Patient seen in clinic with complaints of right hip pain status post right total hip arthroplasty. Tried and failed all conservative measures including activity modification, anti-inflammatories, and physical therapy. Patient felt as though they have reached a point of disability with regards to right hip pain. Patient was worked up for infection and found to be positive. Risk, benefits, and alternatives discussed for revision of right total hip arthroplasty. Despite these risks, the patient wished to proceed with surgical intervention.    Patient admitted as an inpatient. Seen preoperatively by anesthesia and cleared for surgery. Patient was then taken to the OR and a revision of right total hip arthroplasty was performed. For full details please see dictated operative note. Patient tolerated the procedure without any issues and was transferred to the floor postoperatively. Physical therapy began working with the patient on postop day 1 and patient was made weightbearing as tolerated to affected extremity. Patient progressed well with physical therapy and eventually cleared all physical therapy guidelines. Patient's pain and vitals remained stable throughout hospitalization. Infectious Disease was consulted for evaluation and treatment of intraoperative cultures. Wound was checked and found to be clean, dry, and intact. At this point the patient was deemed suitable to discharge home.       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 Increase Glipizide XL to 10 mg daily. Refer to Dr Amos   03/16/2023 - Hgb A1c 6.6, never went to endocrinology, did not increase Glipizide to 10 mg daily, new RX sent today for Glipizide XL 10 mg daily  09/21/2023 - Hgb A1c 7.9, add pioglitazone 30 mg daily          Resolved Hospital Problems   No resolved problems to display.       Discharged Condition: good    [place] [facility] Up/Patient Instructions:     Medications:  Reconciled Home Medications:      Medication List        START taking these medications      polyethylene glycol 17 gram Pwpk  Commonly known as: GLYCOLAX  Take 17 g by mouth 2 (two) times daily. Constipation PREVENTION for 14 days     * sodium chloride 0.9% injection  Commonly known as: NORMAL SALINE FLUSH  Inject 10 mLs into the vein every 6 (six) hours.     * sodium chloride 0.9% injection  Commonly known as: NORMAL SALINE FLUSH  Inject 10 mLs into the vein as needed.     sodium chloride 0.9% SolP 50 mL with DAPTOmycin 500 mg SolR 1,193.5 mg  Inject 1,193.5 mg into the vein once daily.           * This list has 2 medication(s) that are the same as other medications prescribed for you. Read the directions  carefully, and ask your doctor or other care provider to review them with you.                CHANGE how you take these medications      allopurinoL 300 MG tablet  Commonly known as: ZYLOPRIM  Take 1 tablet by mouth twice daily  What changed:   how much to take  when to take this     doxazosin 4 MG tablet  Commonly known as: CARDURA  Take 1 tablet by mouth once daily  What changed: when to take this     finasteride 5 mg tablet  Commonly known as: PROSCAR  Take 1 tablet by mouth once daily  What changed: when to take this     HYDROcodone-acetaminophen 5-325 mg per tablet  Commonly known as: NORCO  Take 1 tablet by mouth every 6 (six) hours as needed for Pain.  What changed: Another medication with the same name was removed. Continue taking this medication, and follow the directions you see here.            CONTINUE taking these medications      ELIQUIS 5 mg Tab  Generic drug: apixaban  Take 5 mg by mouth 2 (two) times daily.     glipiZIDE 10 MG Tr24  Commonly known as: GLUCOTROL  Take 1 tablet (10 mg total) by mouth daily with breakfast.     loratadine 10 mg tablet  Commonly known as: CLARITIN  Take 10 mg by mouth daily as needed.     OZEMPIC 0.25 mg or 0.5 mg(2 mg/1.5 mL) pen injector  Generic drug: semaglutide  Inject into the skin every 7 days.     pioglitazone 30 MG tablet  Commonly known as: ACTOS  Take 1 tablet (30 mg total) by mouth once daily.     rosuvastatin 20 MG tablet  Commonly known as: CRESTOR  Take 20 mg by mouth every evening.            STOP taking these medications      doxycycline 100 MG Cap  Commonly known as: VIBRAMYCIN     doxycycline 100 MG tablet  Commonly known as: VIBRA-TABS            Discharge Procedure Orders   Ambulatory referral/consult to Home Health   Standing Status: Future   Referral Priority: Routine Referral Type: Home Health   Referral Reason: Specialty Services Required   Requested Specialty: Home Health Services   Number of Visits Requested: 1     Ambulatory referral/consult  to Outpatient Infusion and Home Infusion   Standing Status: Future Standing Exp. Date: 05/12/25   Referral Priority: Routine   Referral Reason: Specialty Services Required   Number of Visits Requested: 1     Ambulatory referral/consult to Home Health   Standing Status: Future   Referral Priority: Routine Referral Type: Home Health   Referral Reason: Specialty Services Required   Requested Specialty: Home Health Services   Number of Visits Requested: 1      Follow-up Information       Jose Luna MD. Go on 4/25/2024.    Specialty: Orthopedic Surgery  Why: Ortho follow up appt on Thursday 4/25/24 @ 1:15pm.  Contact information:  [address]  [address]  [place] [address]  [phone]               [facility] Follow up.    Specialty: Infusion Provider  Why: This is the infusion company. Call if you have questions or concerns.  Contact information:  [address]  [address]  [place] [address] [phone]               [facility] Follow up.    Specialties: Home Health Services, Home Therapy Services, Home Living Aide Services  Why: This is home health agency. Call if you have questions or concerns.  Contact information:  [address] [place] [id number] [phone]             Humberto Jackson MD Follow up.    Specialty: Infectious Diseases  Why: Someone from Dr Jackson's office will contact you directly with your follow up date and time.  Contact information:  [address] [place] [address] Dr Charles CARDOZA [id number] [phone]

## 2024-04-22 NOTE — PROGRESS NOTES
I have seen the patient, reviewed the Nurse Practitioner's discharge summary. I have personally interviewed and examined the patient at bedside and: agree with the findings.     Jose Luna MD  Orthopedic Surgery

## 2024-04-25 ENCOUNTER — HOSPITAL ENCOUNTER (OUTPATIENT)
Dept: RADIOLOGY | Facility: CLINIC | Age: 79
Discharge: HOME | End: 2024-04-25
Attending: ORTHOPAEDIC SURGERY
Payer: MEDICARE

## 2024-04-25 ENCOUNTER — OFFICE VISIT (OUTPATIENT)
Dept: ORTHOPEDICS | Facility: CLINIC | Age: 79
End: 2024-04-25
Payer: MEDICARE

## 2024-04-25 VITALS
SYSTOLIC BLOOD PRESSURE: 122 MMHG | WEIGHT: 315 LBS | HEIGHT: 72 IN | BODY MASS INDEX: 42.66 KG/M2 | DIASTOLIC BLOOD PRESSURE: 72 MMHG | HEART RATE: 92 BPM

## 2024-04-25 DIAGNOSIS — Z96.641 S/P HIP REPLACEMENT, RIGHT: ICD-10-CM

## 2024-04-25 DIAGNOSIS — Z96.649 S/P REVISION OF TOTAL HIP: ICD-10-CM

## 2024-04-25 DIAGNOSIS — Z96.649 S/P REVISION OF TOTAL HIP: Primary | ICD-10-CM

## 2024-04-25 PROCEDURE — 73502 X-RAY EXAM HIP UNI 2-3 VIEWS: CPT | Mod: RT,,, | Performed by: ORTHOPAEDIC SURGERY

## 2024-04-25 PROCEDURE — 99024 POSTOP FOLLOW-UP VISIT: CPT | Mod: POP,,, | Performed by: ORTHOPAEDIC SURGERY

## 2024-04-25 NOTE — PROGRESS NOTES
Chief Complaint:   Chief Complaint   Patient presents with    Right Hip - Post-op Evaluation     2wks out Post-Op Rt Hip Revision Sx 4/8/24-GL 7/7/24.  Patient states his hip hurts on top and in the joint he thinks it maybe from PT. He does PT 3xs a wk. He has point on heel of foot and its causing pain.         History of present illness:    History of Present Illness  The patient presents for evaluation of multiple medical concerns. He is accompanied by an adult female.    The patient suspects he overexerted himself during physical therapy yesterday, resulting in soreness in his joints today. He reports difficulty in finding a comfortable position in both legs, with the right leg being more affected than the left. He is two weeks post-revision of his right hip with an antibiotic spacer. Until today, his mobility was improving, and he was able to ambulate within his residence. However, he is not yet fully recovered by 75 percent, albeit limited at 50 percent. He is unable to bear weight on the affected leg. His infectious disease physician adjusted his medication to hydrocodone, which he takes at night. His current medication regimen includes daptomycin, administered once daily. He is also on Eliquis.    The patient has been using a pressure point on his heel, which initially provided relief. However, today, while driving to the clinic, he experienced pain. He has not been wearing shoes at home. The accompanying adult female reports that the abduction brace, which fits between his legs in the hospital, fell on the corner and underneath his foot. He has been wearing it for approximately four days. The severity of his heel pain has escalated over the past three to four days.    Past Medical History:   Diagnosis Date    Arthritis     Atrial fibrillation     Bladder cancer     CKD (chronic kidney disease)     Diabetes mellitus, type 2     Gout, unspecified     Hyperlipidemia     Hypertension     Restless leg syndrome      Seasonal allergies     Sleep apnea, unspecified     Thyroid disease        Past Surgical History:   Procedure Laterality Date    ARTHROGRAM Right 02/15/2024    Procedure: ARTHROGRAM  aspiration right hip with anesthesia #1;  Surgeon: Jose Luna MD;  Location: [facility] OR;  Service: Orthopedics;  Laterality: Right;  aspiration right hip with anesthesia    BLADDER SURGERY  2009    removed Ca    CARDIAC CATHETERIZATION      CARDIOVERSION      CATARACT EXTRACTION W/  INTRAOCULAR LENS IMPLANT Bilateral     JOINT REPLACEMENT Right     Total Hip    MULTIPLE TOOTH EXTRACTIONS      REVISION TOTAL HIP ARTHROPLASTY Right 4/8/2024    Procedure: REVISION, TOTAL ARTHROPLASTY, HIP - revision R COLLIN with abx spacer;  Surgeon: Jose Luna MD;  Location: [facility] OR;  Service: Orthopedics;  Laterality: Right;  revision R COLLIN with abx spacer    VEIN SURGERY         Current Outpatient Medications   Medication Sig Dispense Refill    allopurinoL (ZYLOPRIM) 300 MG tablet Take 1 tablet by mouth twice daily (Patient taking differently: Take 150 mg by mouth Daily.) 180 tablet 1    doxazosin (CARDURA) 4 MG tablet Take 1 tablet by mouth once daily (Patient taking differently: Take 4 mg by mouth once daily.) 90 tablet 1    ELIQUIS 5 mg Tab Take 5 mg by mouth 2 (two) times daily.      finasteride (PROSCAR) 5 mg tablet Take 1 tablet by mouth once daily (Patient taking differently: Take 5 mg by mouth once daily.) 90 tablet 1    loratadine (CLARITIN) 10 mg tablet Take 10 mg by mouth daily as needed.      pioglitazone (ACTOS) 30 MG tablet Take 1 tablet (30 mg total) by mouth once daily. 90 tablet 3    polyethylene glycol (GLYCOLAX) 17 gram PwPk Take 17 g by mouth 2 (two) times daily. Constipation PREVENTION for 14 days 28 each 0    rosuvastatin (CRESTOR) 20 MG tablet Take 20 mg by mouth every evening.      semaglutide (OZEMPIC) 0.25 mg or 0.5 mg(2 mg/1.5 mL) pen injector Inject into the skin every 7 days.      sodium chloride 0.9% (NORMAL  SALINE FLUSH) injection Inject 10 mLs into the vein every 6 (six) hours.      sodium chloride 0.9% (NORMAL SALINE FLUSH) injection Inject 10 mLs into the vein as needed.      sodium chloride 0.9% SolP 50 mL with DAPTOmycin 500 mg SolR 1,193.5 mg Inject 1,193.5 mg into the vein once daily.      glipiZIDE (GLUCOTROL) 10 MG TR24 Take 1 tablet (10 mg total) by mouth daily with breakfast. 90 tablet 3     No current facility-administered medications for this visit.     Facility-Administered Medications Ordered in Other Visits   Medication Dose Route Frequency Provider Last Rate Last Admin    lactated ringers infusion   Intravenous Continuous Jana Rios MD           Review of patient's allergies indicates:   Allergen Reactions    Clindamycin Rash    Levofloxacin Hives       Family History   Problem Relation Name Age of Onset    Coronary artery disease Mother      Cancer Father Chris coleman sr.     Heart disease Father Chris noer sr.     Thyroid disease Sister         Social History     Socioeconomic History    Marital status:    Tobacco Use    Smoking status: Never    Smokeless tobacco: Never   Substance and Sexual Activity    Alcohol use: Not Currently    Drug use: Not Currently     Types: Marijuana    Sexual activity: Yes     Partners: Female           Review of Systems:    Constitution: Negative for chills, fever, and sweats.  Negative for unexplained weight loss.    HENT:  Negative for headaches and blurry vision.    Cardiovascular:Negative for chest pain or irregular heart beat. Negative for hypertension.    Respiratory:  Negative for cough and shortness of breath.    Gastrointestinal: Negative for abdominal pain, heartburn, melena, nausea, and vomitting.    Genitourinary:  Negative bladder incontinence and dysuria.    Musculoskeletal:  See HPI    Neurological: Negative for numbness.    Psychiatric/Behavioral: Negative for depression.  The patient is not nervous/anxious.      Endocrine: Negative for  polyuria    Hematologic/Lymphatic: Negative for bleeding problem.  Does not bruise/bleed easily.    Skin: Negative for poor would healing and rash      Physical Examination:    Vital Signs:    Vitals:    04/25/24 1311   BP: 122/72   Pulse: 92       Body mass index is 44.62 kg/m².       General: No acute distress, alert and oriented, healthy appearing    HEENT: Head is atraumatic, mucous membranes are moist    Neck: Supples, no JVD    Cardiovascular: Palpable dorsalis pedis and posterior tibial pulses, regular rate and rhythm to those pulses    Lungs: Breathing non-labored    Skin: no rashes appreciated    Neurologic: Can flex and extend knees, ankles, and toes. Sensation is grossly intact     Right hip:    Patient's incision is clean and dry.  Has a very small amount of serous drainage.  There is no significant erythema.    X-rays:   Three views right hip reviewed with the patient's implants well fixed.  No signs of loosening or subsidence.  Antibiotic beads begins all.     Assessment::  Status post revision of the right hip    Plan:   discussed all treatment with the patient.  Patient overall doing excellent.  He is having some pain although slightly due to increase in activity physical therapy.  Plan to see him back in week.  Discontinue sutures at that point.  They are going to [employer] paint next week to get the incision clean dry prior to removal staples.    This note was generated with the assistance of ambient listening technology. Verbal consent was obtained by the patient and accompanying visitor(s) for the recording of patient appointment to facilitate this note. I attest to having reviewed and edited the generated note for accuracy, though some syntax or spelling errors may persist. Please contact the author of this note for any clarification.      This note was created using [employer] voice recognition software that occasionally misinterpreted phrases or words.    Consult note is delivered via Epic  messaging service.

## 2024-05-01 ENCOUNTER — EXTERNAL HOME HEALTH (OUTPATIENT)
Dept: HOME HEALTH SERVICES | Facility: HOSPITAL | Age: 79
End: 2024-05-01
Payer: MEDICARE

## 2024-05-02 ENCOUNTER — HOSPITAL ENCOUNTER (OUTPATIENT)
Dept: RADIOLOGY | Facility: CLINIC | Age: 79
Discharge: HOME | End: 2024-05-02
Attending: NURSE PRACTITIONER
Payer: MEDICARE

## 2024-05-02 ENCOUNTER — OFFICE VISIT (OUTPATIENT)
Dept: ORTHOPEDICS | Facility: CLINIC | Age: 79
End: 2024-05-02
Payer: MEDICARE

## 2024-05-02 VITALS
HEIGHT: 72 IN | WEIGHT: 315 LBS | SYSTOLIC BLOOD PRESSURE: 114 MMHG | BODY MASS INDEX: 42.66 KG/M2 | DIASTOLIC BLOOD PRESSURE: 69 MMHG | HEART RATE: 84 BPM

## 2024-05-02 DIAGNOSIS — S91.301A NON-HEALING WOUND OF RIGHT HEEL: ICD-10-CM

## 2024-05-02 DIAGNOSIS — Z96.641 S/P HIP REPLACEMENT, RIGHT: ICD-10-CM

## 2024-05-02 DIAGNOSIS — Z96.641 S/P HIP REPLACEMENT, RIGHT: Primary | ICD-10-CM

## 2024-05-02 PROCEDURE — 99024 POSTOP FOLLOW-UP VISIT: CPT | Mod: POP,,, | Performed by: NURSE PRACTITIONER

## 2024-05-02 NOTE — PROGRESS NOTES
Chief Complaint:   Chief Complaint   Patient presents with    Right Hip - Post-op Evaluation     Post-Op Rt Hip Revision 4/8/24-GL 7/7/24 patient states he feel a little better from last wk and he can walk but home health does not want him to walk much bc he has a pressure blister on his Rt knee. He does PT 3xs a wk. He does use a walker a home. Has question about home health Results.       History of present illness: Chris Mendoza II is a 78 y.o. male, presents to clinic today in regards to his right hip.  He is about 3 weeks out from revision right hip for infection.  Today for incision site check he is still does have minimal serous drainage noted to the distal aspect of the incision site on dressing.  No active drainage or bleeding.  Does have some erythema.  The incision site is not well approximated at this time.  He is currently on IV antibiotics per ID.      Past Medical History:   Diagnosis Date    Arthritis     Atrial fibrillation     Bladder cancer     CKD (chronic kidney disease)     Diabetes mellitus, type 2     Gout, unspecified     Hyperlipidemia     Hypertension     Restless leg syndrome     Seasonal allergies     Sleep apnea, unspecified     Thyroid disease        Past Surgical History:   Procedure Laterality Date    ARTHROGRAM Right 02/15/2024    Procedure: ARTHROGRAM  aspiration right hip with anesthesia #1;  Surgeon: Jose Luna MD;  Location: [facility] OR;  Service: Orthopedics;  Laterality: Right;  aspiration right hip with anesthesia    BLADDER SURGERY  2009    removed Ca    CARDIAC CATHETERIZATION      CARDIOVERSION      CATARACT EXTRACTION W/  INTRAOCULAR LENS IMPLANT Bilateral     JOINT REPLACEMENT Right     Total Hip    MULTIPLE TOOTH EXTRACTIONS      REVISION TOTAL HIP ARTHROPLASTY Right 4/8/2024    Procedure: REVISION, TOTAL ARTHROPLASTY, HIP - revision R COLLIN with abx spacer;  Surgeon: Jose Luna MD;  Location: [facility] OR;  Service: Orthopedics;  Laterality: Right;  revision R COLLIN  with abx spacer    VEIN SURGERY         Current Outpatient Medications   Medication Sig Dispense Refill    allopurinoL (ZYLOPRIM) 300 MG tablet Take 1 tablet by mouth twice daily (Patient taking differently: Take 150 mg by mouth Daily.) 180 tablet 1    doxazosin (CARDURA) 4 MG tablet Take 1 tablet by mouth once daily (Patient taking differently: Take 4 mg by mouth once daily.) 90 tablet 1    ELIQUIS 5 mg Tab Take 5 mg by mouth 2 (two) times daily.      finasteride (PROSCAR) 5 mg tablet Take 1 tablet by mouth once daily (Patient taking differently: Take 5 mg by mouth once daily.) 90 tablet 1    loratadine (CLARITIN) 10 mg tablet Take 10 mg by mouth daily as needed.      pioglitazone (ACTOS) 30 MG tablet Take 1 tablet (30 mg total) by mouth once daily. 90 tablet 3    rosuvastatin (CRESTOR) 20 MG tablet Take 20 mg by mouth every evening.      semaglutide (OZEMPIC) 0.25 mg or 0.5 mg(2 mg/1.5 mL) pen injector Inject into the skin every 7 days.      sodium chloride 0.9% (NORMAL SALINE FLUSH) injection Inject 10 mLs into the vein every 6 (six) hours.      sodium chloride 0.9% (NORMAL SALINE FLUSH) injection Inject 10 mLs into the vein as needed.      sodium chloride 0.9% SolP 50 mL with DAPTOmycin 500 mg SolR 1,193.5 mg Inject 1,193.5 mg into the vein once daily.      glipiZIDE (GLUCOTROL) 10 MG TR24 Take 1 tablet (10 mg total) by mouth daily with breakfast. 90 tablet 3     No current facility-administered medications for this visit.     Facility-Administered Medications Ordered in Other Visits   Medication Dose Route Frequency Provider Last Rate Last Admin    lactated ringers infusion   Intravenous Continuous Jana Rios MD           Review of patient's allergies indicates:   Allergen Reactions    Clindamycin Rash    Levofloxacin Hives       Family History   Problem Relation Name Age of Onset    Coronary artery disease Mother      Cancer Father Chris cee sr.     Heart disease Father Chris cee sr.     Thyroid  disease Sister         Social History     Socioeconomic History    Marital status:    Tobacco Use    Smoking status: Never    Smokeless tobacco: Never   Substance and Sexual Activity    Alcohol use: Not Currently    Drug use: Not Currently     Types: Marijuana    Sexual activity: Yes     Partners: Female           Review of Systems:    Denies fevers, chills, chest pain, shortness of breath. Comprehensive review of systems performed and otherwise negative except as noted in HPI      Physical Examination:    General: awake and alert, no acute distress, healthy appearing  Head and Neck: Head atraumatic/normocephalic. Moist MM  CV: brisk cap refill  Lungs: non-labored breathing, w/o cough or SOB  Skin: no rashes present, warm to touch  Neuro: sensation grossly intact distall     Vital Signs:    Vitals:    05/02/24 1442   BP: 114/69   Pulse: 84       Body mass index is 44.62 kg/m².    Right hip exam:    Right hip incision clean and dry.  Mild erythema no active drainage.  No swelling distally. No signs of DVT  Brisk cap refill right foot  Sensation intact distally to right foot  Positive FHL/EHL/gastrocsoleus/tib ant       Assessment::post op status post R COLLIN    Plan:  Presents to clinic today in regards to his right hip.  He is about 3 weeks out from surgery.  Here for incision site check.  Every other staple was removed of drainage.  Patient was re-educated to apply Betadine paint with every dressing change.  He does state understanding of this.  Patient complains of worsening pressure ulcer to the right heel.  He states that this pain has gotten better.  Denies seeing a podiatrist.  We will send a referral to Podiatry for further evaluation and treatment of right heel wound.  In regards to the hip he is to follow up in clinic in 1 week for remaining staples were removed.  Patient states understanding and agrees with plan of care.    This note was created using [employer] voice recognition software that  occasionally misinterpreted phrases or words.    Consult note is delivered via Epic messaging service.

## 2024-05-09 ENCOUNTER — OFFICE VISIT (OUTPATIENT)
Dept: INFECTIOUS DISEASES | Facility: CLINIC | Age: 79
End: 2024-05-09
Payer: MEDICARE

## 2024-05-09 ENCOUNTER — OFFICE VISIT (OUTPATIENT)
Dept: ORTHOPEDICS | Facility: CLINIC | Age: 79
End: 2024-05-09
Payer: MEDICARE

## 2024-05-09 VITALS
HEART RATE: 100 BPM | DIASTOLIC BLOOD PRESSURE: 82 MMHG | BODY MASS INDEX: 42.66 KG/M2 | WEIGHT: 315 LBS | SYSTOLIC BLOOD PRESSURE: 122 MMHG | HEIGHT: 72 IN

## 2024-05-09 VITALS
WEIGHT: 315 LBS | OXYGEN SATURATION: 98 % | BODY MASS INDEX: 42.66 KG/M2 | HEART RATE: 87 BPM | RESPIRATION RATE: 20 BRPM | DIASTOLIC BLOOD PRESSURE: 66 MMHG | HEIGHT: 72 IN | SYSTOLIC BLOOD PRESSURE: 112 MMHG

## 2024-05-09 DIAGNOSIS — T84.59XD PROSTHETIC HIP INFECTION, SUBSEQUENT ENCOUNTER: ICD-10-CM

## 2024-05-09 DIAGNOSIS — N18.31 STAGE 3A CHRONIC KIDNEY DISEASE: Primary | ICD-10-CM

## 2024-05-09 DIAGNOSIS — E11.22 TYPE 2 DIABETES MELLITUS WITH STAGE 3A CHRONIC KIDNEY DISEASE, WITHOUT LONG-TERM CURRENT USE OF INSULIN: ICD-10-CM

## 2024-05-09 DIAGNOSIS — Z96.649 PROSTHETIC HIP INFECTION, SUBSEQUENT ENCOUNTER: ICD-10-CM

## 2024-05-09 DIAGNOSIS — Z96.641 S/P HIP REPLACEMENT, RIGHT: Primary | ICD-10-CM

## 2024-05-09 DIAGNOSIS — N18.31 TYPE 2 DIABETES MELLITUS WITH STAGE 3A CHRONIC KIDNEY DISEASE, WITHOUT LONG-TERM CURRENT USE OF INSULIN: ICD-10-CM

## 2024-05-09 PROCEDURE — 99024 POSTOP FOLLOW-UP VISIT: CPT | Mod: POP,,, | Performed by: NURSE PRACTITIONER

## 2024-05-09 PROCEDURE — 99214 OFFICE O/P EST MOD 30 MIN: CPT | Mod: PBBFAC | Performed by: NURSE PRACTITIONER

## 2024-05-09 PROCEDURE — 99214 OFFICE O/P EST MOD 30 MIN: CPT | Mod: S$PBB,,, | Performed by: NURSE PRACTITIONER

## 2024-05-09 PROCEDURE — 99999 PR PBB SHADOW E&M-EST. PATIENT-LVL IV: CPT | Mod: PBBFAC,,, | Performed by: NURSE PRACTITIONER

## 2024-05-09 RX ORDER — DOXYCYCLINE HYCLATE 100 MG
100 TABLET ORAL DAILY
Qty: 60 TABLET | Refills: 5 | Status: SHIPPED | OUTPATIENT
Start: 2024-05-09

## 2024-05-09 RX ORDER — DAPTOMYCIN 50 MG/ML
INJECTION, POWDER, LYOPHILIZED, FOR SOLUTION INTRAVENOUS
COMMUNITY
Start: 2024-05-09

## 2024-05-09 NOTE — PROGRESS NOTES
Infectious Disease Clinic    HISTORY OF PRESENT ILLNESS:     Chris Mendoza II is a 78 y.o. male here today for f/u    Initial hospital encounter  78-year-old male patient known to have past medical history significant for DM 2, obesity, gout, prior COLLIN, complicated by MRSA infection s/p revision 05/11/2020, completed 6 weeks of IV vancomycin which was complicated by DAGOBERTO, transitioned to daptomycin, completed 6 weeks of antibiotics and patient did fairly well, since then he has been doing relatively okay.  However in 02/2024 he started having worsening pain in the right hip, followed up with his orthopedic surgeon, had aspiration done 02/05/2024, this grew MRSA, patient was given course of doxycycline however no significant improvement, presented to the hospital for revision of COLLIN.  He underwent revision on 04/08/2024 with revision of the right total hip arthroplasty both components, placement of antibiotic delivery device, complete synovectomy of the right hip.  Intraoperative cultures growing MRSA again.  ID consulted for assistance in management.  Antibiotics:  Cefazolin 04/08 - 04/09  Vancomycin 04/08 - 04/12/2024       Septic prosthetic right hip   MRSA infection   Prior MRSA infection of same hip  Dm 2  Obesity      Plan:  -discontinue vancomycin given patient's previous history of DAGOBERTO while on this medicine   -start daptomycin 8 milligrams/kilograms IV Q 24 hours  -will need 6 weeks day of surgery 04/08/2024   -anticipated end date 05/20/2024   -after completion of IV antibiotics, would recommend a p.o. tail for at least 2-3 months depending on surgical plans and patient's clinical improvement  -add on CPK to current labs   -Please monitor weekly CBC, CMP, ESR, CRP and CPK Fax results to my office at [phone]  -will set up follow-up with us in the office in 2-3 weeks  -discussed with patient, wife, nursing, primary team attending    5/9/2024  Here for f/u accompanied by wife.  No complaints.  Doing well  in therapy.   Remains on daptomycin, recent labs good.      Past Medical History:   Diagnosis Date    Arthritis     Atrial fibrillation     Bladder cancer     CKD (chronic kidney disease)     Diabetes mellitus, type 2     Gout, unspecified     Hyperlipidemia     Hypertension     Restless leg syndrome     Seasonal allergies     Sleep apnea, unspecified     Thyroid disease         Past Surgical History:   Procedure Laterality Date    ARTHROGRAM Right 02/15/2024    Procedure: ARTHROGRAM  aspiration right hip with anesthesia #1;  Surgeon: Jose Luna MD;  Location: [facility];  Service: Orthopedics;  Laterality: Right;  aspiration right hip with anesthesia    BLADDER SURGERY  2009    removed Ca    CARDIAC CATHETERIZATION      CARDIOVERSION      CATARACT EXTRACTION W/  INTRAOCULAR LENS IMPLANT Bilateral     JOINT REPLACEMENT Right     Total Hip    MULTIPLE TOOTH EXTRACTIONS      REVISION TOTAL HIP ARTHROPLASTY Right 4/8/2024    Procedure: REVISION, TOTAL ARTHROPLASTY, HIP - revision R COLLIN with abx spacer;  Surgeon: Jose Luna MD;  Location: [facility];  Service: Orthopedics;  Laterality: Right;  revision R COLLIN with abx spacer    VEIN SURGERY          Social History     Tobacco Use    Smoking status: Never    Smokeless tobacco: Never   Substance and Sexual Activity    Alcohol use: Not Currently    Drug use: Not Currently     Types: Marijuana    Sexual activity: Yes     Partners: Female        Review of patient's allergies indicates:   Allergen Reactions    Clindamycin Rash    Levofloxacin Hives        Patient Care Team:  Shobha Mcwilliams FNP as PCP - General (Nurse Practitioner)  Kenyon Lara II, MD as Consulting Physician (Ophthalmology)  Diogo Malloy MD as Consulting Physician (Cardiovascular Disease)  Anthony Miller OD (Optometry)  Jose Luna MD as Consulting Physician (Orthopedic Surgery)     MEDICATIONS:     Current Outpatient Medications   Medication Instructions    allopurinoL  (ZYLOPRIM) 300 MG tablet Take 1 tablet by mouth twice daily    DAPTOmycin (CUBICIN) 500 mg injection Intravenous    doxazosin (CARDURA) 4 MG tablet Take 1 tablet by mouth once daily    doxycycline (VIBRA-TABS) 100 mg, Oral, Daily    ELIQUIS 5 mg, Oral, 2 times daily    finasteride (PROSCAR) 5 mg tablet Take 1 tablet by mouth once daily    glipiZIDE (GLUCOTROL) 10 mg, Oral, With breakfast    loratadine (CLARITIN) 10 mg, Oral, Daily PRN    pioglitazone (ACTOS) 30 mg, Oral, Daily    rosuvastatin (CRESTOR) 20 mg, Oral, Nightly    semaglutide (OZEMPIC) 0.25 mg or 0.5 mg(2 mg/1.5 mL) pen injector Subcutaneous, Every 7 days    sodium chloride 0.9% (NORMAL SALINE FLUSH) injection 10 mLs, Intravenous, Every 6 hours    sodium chloride 0.9% (NORMAL SALINE FLUSH) injection 10 mLs, Intravenous, As needed (PRN)    sodium chloride 0.9% SolP 50 mL with DAPTOmycin 500 mg SolR 1,193.5 mg 8 mg/kg, Intravenous, Daily       REVIEW OF SYSTEMS:   Except as documented in the HPI, all other systems reviewed and negative. See HPI for details.     PHYSICAL EXAM:     Visit Vitals  /66 (BP Location: Left arm)   Pulse 87   Resp 20   Ht 6' (1.829 m)   Wt (!) 149.2 kg (329 lb)   SpO2 98%   BMI 44.62 kg/m²       GENERAL: NAD; does not appear toxic  SKIN: no rash  HEENT: sclera non-icteric; PERRL   NECK: supple; no LAD  CHEST: CTA; nonlabored, equal expansion; no adventitious BS  CARDIOVASCULAR: RRR, S1S2; no murmur; strong, equal peripheral pulses; no edema  ABDOMEN:  active bowel sounds; abdomen soft, nondistended, nontender to palpation  EXTREMITIES: no cyanosis or clubbing; R hip incision closed with staples, some edema still but site looks good  NEURO: AAO x4; CN II-XII grossly intact  PSYCH: Mentation and affect appropriate     LABS AND IMAGING:     Independently reviewed all laboratory and diagnostic studies relevant to infectious disease.    ASSESSMENT:     Problem List Items Addressed This Visit          Renal/    Chronic kidney  disease - Primary       Endocrine    Type 2 diabetes mellitus with chronic kidney disease, without long-term current use of insulin       Other    Prosthetic hip infection, subsequent encounter        PLAN:     Continue daptomycin 8mg/kg IV q24h as planned w/end date of 5/20.  IV course to be followed by doxycycline 100mg PO BID at least for 2-3 months.  Rx called in today.   Continue weekly CBC, CMP, CRP, ESR, CK.  Labs good w/no concerns.  Goes today to ortho for f/u as well - continues with staples at present.  Care per them.   F/u with us in about a month.  Discussed with patient and wife.       CHARO Aly  Infectious Diseases

## 2024-05-09 NOTE — PROGRESS NOTES
Chief Complaint:   Chief Complaint   Patient presents with    Right Hip - Follow-up     4.5 week f/u from right hip revision. Reports improvement in pain. Currently in physical therapy 3x per week.        History of present illness: Chris Mendoza II is a 78 y.o. male, presents to the clinic today in regards to his right hip.  Patient is here for a wound evaluation.  No active drainage or bleeding.  Patient has been compliant with the applying Betadine paint to dressing changes.  He is having significant reaction to the adhesive from the bandage in which he has some blistering.  Patient's wife states she has been putting Neosporin on this.  Regards to his pain denies any pain.  Here today in a wheelchair.  Also continues his IV antibiotics and we will be switching over to by mouth antibiotics within 2 weeks.      Past Medical History:   Diagnosis Date    Arthritis     Atrial fibrillation     Bladder cancer     CKD (chronic kidney disease)     Diabetes mellitus, type 2     Gout, unspecified     Hyperlipidemia     Hypertension     Restless leg syndrome     Seasonal allergies     Sleep apnea, unspecified     Thyroid disease        Past Surgical History:   Procedure Laterality Date    ARTHROGRAM Right 02/15/2024    Procedure: ARTHROGRAM  aspiration right hip with anesthesia #1;  Surgeon: Jose Luna MD;  Location: [facility] OR;  Service: Orthopedics;  Laterality: Right;  aspiration right hip with anesthesia    BLADDER SURGERY  2009    removed Ca    CARDIAC CATHETERIZATION      CARDIOVERSION      CATARACT EXTRACTION W/  INTRAOCULAR LENS IMPLANT Bilateral     JOINT REPLACEMENT Right     Total Hip    MULTIPLE TOOTH EXTRACTIONS      REVISION TOTAL HIP ARTHROPLASTY Right 4/8/2024    Procedure: REVISION, TOTAL ARTHROPLASTY, HIP - revision R COLLIN with abx spacer;  Surgeon: Jose Luna MD;  Location: [facility] OR;  Service: Orthopedics;  Laterality: Right;  revision R COLLIN with abx spacer    VEIN SURGERY         Current  Outpatient Medications   Medication Sig    allopurinoL (ZYLOPRIM) 300 MG tablet Take 1 tablet by mouth twice daily (Patient taking differently: Take 150 mg by mouth Daily.)    DAPTOmycin (CUBICIN) 500 mg injection Inject into the vein.    doxazosin (CARDURA) 4 MG tablet Take 1 tablet by mouth once daily (Patient taking differently: Take 4 mg by mouth once daily.)    doxycycline (VIBRA-TABS) 100 MG tablet Take 1 tablet (100 mg total) by mouth once daily.    ELIQUIS 5 mg Tab Take 5 mg by mouth 2 (two) times daily.    finasteride (PROSCAR) 5 mg tablet Take 1 tablet by mouth once daily (Patient taking differently: Take 5 mg by mouth once daily.)    loratadine (CLARITIN) 10 mg tablet Take 10 mg by mouth daily as needed.    pioglitazone (ACTOS) 30 MG tablet Take 1 tablet (30 mg total) by mouth once daily.    rosuvastatin (CRESTOR) 20 MG tablet Take 20 mg by mouth every evening.    semaglutide (OZEMPIC) 0.25 mg or 0.5 mg(2 mg/1.5 mL) pen injector Inject into the skin every 7 days.    sodium chloride 0.9% (NORMAL SALINE FLUSH) injection Inject 10 mLs into the vein every 6 (six) hours.    sodium chloride 0.9% (NORMAL SALINE FLUSH) injection Inject 10 mLs into the vein as needed.    sodium chloride 0.9% SolP 50 mL with DAPTOmycin 500 mg SolR 1,193.5 mg Inject 1,193.5 mg into the vein once daily.    glipiZIDE (GLUCOTROL) 10 MG TR24 Take 1 tablet (10 mg total) by mouth daily with breakfast.     No current facility-administered medications for this visit.     Facility-Administered Medications Ordered in Other Visits   Medication    lactated ringers infusion       Review of patient's allergies indicates:   Allergen Reactions    Clindamycin Rash    Levofloxacin Hives       Family History   Problem Relation Name Age of Onset    Coronary artery disease Mother      Cancer Father Perez coleman sr.     Heart disease Father Perez coleman sr.     Thyroid disease Sister         Social History     Socioeconomic History    Marital status:     Tobacco Use    Smoking status: Never    Smokeless tobacco: Never   Substance and Sexual Activity    Alcohol use: Not Currently    Drug use: Not Currently     Types: Marijuana    Sexual activity: Yes     Partners: Female           Review of Systems:    Denies fevers, chills, chest pain, shortness of breath. Comprehensive review of systems performed and otherwise negative except as noted in HPI      Physical Examination:    General: awake and alert, no acute distress, healthy appearing  Head and Neck: Head atraumatic/normocephalic. Moist MM  CV: brisk cap refill  Lungs: non-labored breathing, w/o cough or SOB  Skin: no rashes present, warm to touch  Neuro: sensation grossly intact distall     Vital Signs:    Vitals:    05/09/24 1043   BP: 122/82   Pulse: 100       Body mass index is 44.55 kg/m².    Right hip exam:    Right hip incision clean dry and intact. No erythema, drainage, or signs of infection.  Blistering noted around the incision site near the adhesive areas from the tape.  No swelling distally. No signs of DVT  Brisk cap refill right foot  Sensation intact distally to right foot  Positive FHL/EHL/gastrocsoleus/tib ant       Assessment::post op status post revision R COLLIN    Plan:  Patient presents for wound evaluation.  Remaining staples have been removed.  He is to stop doing the dressing changes as they adhesive has made such complication with the skin.  After removal dressing he did have an abrasion which was dressed with Xeroform and bandage.  Patient was educated can not remove this once home.  Patient was not put any type of Neosporin or creams on top of the incision site for least 1 week.  Also was encouraged to call if it did worsen or began to have any drainage.  We will have him follow up in the office in 1 month at this point we will get x-rays to reassess his hip.  Patient states understanding and agrees with plan of care.      This note was created using [employer] voice recognition  software that occasionally misinterpreted phrases or words.    Consult note is delivered via Epic messaging service.

## 2024-05-15 DIAGNOSIS — N40.0 BENIGN PROSTATIC HYPERPLASIA, UNSPECIFIED WHETHER LOWER URINARY TRACT SYMPTOMS PRESENT: ICD-10-CM

## 2024-05-15 RX ORDER — DOXAZOSIN 4 MG/1
TABLET ORAL
Qty: 90 TABLET | Refills: 2 | Status: SHIPPED | OUTPATIENT
Start: 2024-05-15

## 2024-05-23 ENCOUNTER — DOCUMENT SCAN (OUTPATIENT)
Dept: HOME HEALTH SERVICES | Facility: HOSPITAL | Age: 79
End: 2024-05-23
Payer: MEDICARE

## 2024-05-23 ENCOUNTER — TELEPHONE (OUTPATIENT)
Dept: FAMILY MEDICINE | Facility: CLINIC | Age: 79
End: 2024-05-23
Payer: MEDICARE

## 2024-05-23 DIAGNOSIS — N18.31 TYPE 2 DIABETES MELLITUS WITH STAGE 3A CHRONIC KIDNEY DISEASE, WITHOUT LONG-TERM CURRENT USE OF INSULIN: ICD-10-CM

## 2024-05-23 DIAGNOSIS — Z12.5 PROSTATE CANCER SCREENING: ICD-10-CM

## 2024-05-23 DIAGNOSIS — E78.5 HYPERLIPIDEMIA, UNSPECIFIED HYPERLIPIDEMIA TYPE: Primary | ICD-10-CM

## 2024-05-23 DIAGNOSIS — E11.22 TYPE 2 DIABETES MELLITUS WITH STAGE 3A CHRONIC KIDNEY DISEASE, WITHOUT LONG-TERM CURRENT USE OF INSULIN: ICD-10-CM

## 2024-05-23 DIAGNOSIS — M10.9 GOUT, UNSPECIFIED CAUSE, UNSPECIFIED CHRONICITY, UNSPECIFIED SITE: ICD-10-CM

## 2024-05-23 DIAGNOSIS — I10 PRIMARY HYPERTENSION: ICD-10-CM

## 2024-05-23 RX ORDER — ALLOPURINOL 300 MG/1
TABLET ORAL
Qty: 180 TABLET | Refills: 0 | Status: SHIPPED | OUTPATIENT
Start: 2024-05-23

## 2024-05-23 NOTE — TELEPHONE ENCOUNTER
Attempted to contact patient for previsit on 5/23/24 at 9:30.  Left message reminding patient about his upcoming visit with labs needed prior to appointment.

## 2024-05-29 ENCOUNTER — DOCUMENT SCAN (OUTPATIENT)
Dept: HOME HEALTH SERVICES | Facility: HOSPITAL | Age: 79
End: 2024-05-29
Payer: MEDICARE

## 2024-06-04 ENCOUNTER — DOCUMENT SCAN (OUTPATIENT)
Dept: HOME HEALTH SERVICES | Facility: HOSPITAL | Age: 79
End: 2024-06-04
Payer: MEDICARE

## 2024-06-11 ENCOUNTER — HOSPITAL ENCOUNTER (OUTPATIENT)
Dept: RADIOLOGY | Facility: CLINIC | Age: 79
Discharge: HOME | End: 2024-06-11
Attending: ORTHOPAEDIC SURGERY
Payer: MEDICARE

## 2024-06-11 ENCOUNTER — OFFICE VISIT (OUTPATIENT)
Dept: ORTHOPEDICS | Facility: CLINIC | Age: 79
End: 2024-06-11
Payer: MEDICARE

## 2024-06-11 VITALS
WEIGHT: 315 LBS | SYSTOLIC BLOOD PRESSURE: 110 MMHG | BODY MASS INDEX: 44.62 KG/M2 | DIASTOLIC BLOOD PRESSURE: 66 MMHG | HEART RATE: 75 BPM

## 2024-06-11 DIAGNOSIS — Z96.649 FAILED TOTAL HIP ARTHROPLASTY, INITIAL ENCOUNTER: ICD-10-CM

## 2024-06-11 DIAGNOSIS — T84.018A FAILED TOTAL HIP ARTHROPLASTY, INITIAL ENCOUNTER: ICD-10-CM

## 2024-06-11 DIAGNOSIS — M17.11 PRIMARY OSTEOARTHRITIS OF RIGHT KNEE: ICD-10-CM

## 2024-06-11 DIAGNOSIS — M17.12 PRIMARY OSTEOARTHRITIS OF LEFT KNEE: ICD-10-CM

## 2024-06-11 DIAGNOSIS — T84.018A FAILED TOTAL HIP ARTHROPLASTY, INITIAL ENCOUNTER: Primary | ICD-10-CM

## 2024-06-11 DIAGNOSIS — Z96.649 FAILED TOTAL HIP ARTHROPLASTY, INITIAL ENCOUNTER: Primary | ICD-10-CM

## 2024-06-11 PROCEDURE — 20610 DRAIN/INJ JOINT/BURSA W/O US: CPT | Mod: 50,79,, | Performed by: ORTHOPAEDIC SURGERY

## 2024-06-11 PROCEDURE — 99213 OFFICE O/P EST LOW 20 MIN: CPT | Mod: 24,25,, | Performed by: ORTHOPAEDIC SURGERY

## 2024-06-11 PROCEDURE — 73502 X-RAY EXAM HIP UNI 2-3 VIEWS: CPT | Mod: RT,,, | Performed by: ORTHOPAEDIC SURGERY

## 2024-06-11 PROCEDURE — 99024 POSTOP FOLLOW-UP VISIT: CPT | Mod: POP,,, | Performed by: ORTHOPAEDIC SURGERY

## 2024-06-11 RX ORDER — LIDOCAINE HYDROCHLORIDE 20 MG/ML
5 INJECTION, SOLUTION EPIDURAL; INFILTRATION; INTRACAUDAL; PERINEURAL
Status: DISCONTINUED | OUTPATIENT
Start: 2024-06-11 | End: 2024-06-11 | Stop reason: HOSPADM

## 2024-06-11 RX ORDER — METHYLPREDNISOLONE ACETATE 80 MG/ML
40 INJECTION, SUSPENSION INTRA-ARTICULAR; INTRALESIONAL; INTRAMUSCULAR; SOFT TISSUE
Status: DISCONTINUED | OUTPATIENT
Start: 2024-06-11 | End: 2024-06-11 | Stop reason: HOSPADM

## 2024-06-11 RX ADMIN — LIDOCAINE HYDROCHLORIDE 5 ML: 20 INJECTION, SOLUTION EPIDURAL; INFILTRATION; INTRACAUDAL; PERINEURAL at 09:06

## 2024-06-11 RX ADMIN — METHYLPREDNISOLONE ACETATE 40 MG: 80 INJECTION, SUSPENSION INTRA-ARTICULAR; INTRALESIONAL; INTRAMUSCULAR; SOFT TISSUE at 09:06

## 2024-06-11 NOTE — PROCEDURES
Large Joint Aspiration/Injection: bilateral knee    Date/Time: 6/11/2024 9:00 AM    Performed by: Jose Luna MD  Authorized by: Jose Luna MD    Consent Done?:  Yes (Verbal)  Indications:  Arthritis, joint swelling and pain  Timeout: prior to procedure the correct patient, procedure, and site was verified    Prep: patient was prepped and draped in usual sterile fashion      Details:  Needle Size:  22 G  Approach:  Anterolateral  Location:  Knee  Laterality:  Bilateral  Site:  Bilateral knee  Medications (Right):  5 mL LIDOcaine (PF) 20 mg/mL (2%) 20 mg/mL (2 %); 40 mg methylPREDNISolone acetate 80 mg/mL  Medications (Left):  5 mL LIDOcaine (PF) 20 mg/mL (2%) 20 mg/mL (2 %); 40 mg methylPREDNISolone acetate 80 mg/mL  Patient tolerance:  Patient tolerated the procedure well with no immediate complications

## 2024-06-11 NOTE — PROGRESS NOTES
Chief Complaint:   Chief Complaint   Patient presents with    Right Hip - Post-op Evaluation     F/u right hip revision 4/8/24. Patient is also here for bilateral zilretta injs. Last injs on 12/21/23. States his hip is sore when he walks on it, but it is doing way better than before sx.        History of present illness:    History of Present Illness  The patient presents for evaluation of multiple medical concerns. He is accompanied by an adult female.    The patient underwent a successful right hip arthroplasty, however, he continues to experience mild discomfort at the superior aspect of his hip upon ambulation. His left hip has now reverted to his preoperative state. His mobility remains unimpaired, and he was able to engage in gardening activities yesterday.    The patient is seeking a repeat knee injection, having previously received cortisone injections. He is currently on a regimen of doxycycline, administered twice daily. His previous implant on the right side has a lifespan. However, he reports difficulty in bending his knees.    Past Medical History:   Diagnosis Date    Arthritis     Atrial fibrillation     Bladder cancer     CKD (chronic kidney disease)     Diabetes mellitus, type 2     Gout, unspecified     Hyperlipidemia     Hypertension     Restless leg syndrome     Seasonal allergies     Sleep apnea, unspecified     Thyroid disease        Past Surgical History:   Procedure Laterality Date    ARTHROGRAM Right 02/15/2024    Procedure: ARTHROGRAM  aspiration right hip with anesthesia #1;  Surgeon: Jose Luna MD;  Location: [facility];  Service: Orthopedics;  Laterality: Right;  aspiration right hip with anesthesia    BLADDER SURGERY  2009    removed Ca    CARDIAC CATHETERIZATION      CARDIOVERSION      CATARACT EXTRACTION W/  INTRAOCULAR LENS IMPLANT Bilateral     JOINT REPLACEMENT Right     Total Hip    MULTIPLE TOOTH EXTRACTIONS      REVISION TOTAL HIP ARTHROPLASTY Right 4/8/2024    Procedure:  REVISION, TOTAL ARTHROPLASTY, HIP - revision R COLLIN with abx spacer;  Surgeon: Jose Luna MD;  Location: [facility];  Service: Orthopedics;  Laterality: Right;  revision R COLLIN with abx spacer    VEIN SURGERY         Current Outpatient Medications   Medication Sig    allopurinoL (ZYLOPRIM) 300 MG tablet Take 1 tablet by mouth twice daily    DAPTOmycin (CUBICIN) 500 mg injection Inject into the vein.    doxazosin (CARDURA) 4 MG tablet Take 1 tablet by mouth once daily    doxycycline (VIBRA-TABS) 100 MG tablet Take 1 tablet (100 mg total) by mouth once daily.    ELIQUIS 5 mg Tab Take 5 mg by mouth 2 (two) times daily.    finasteride (PROSCAR) 5 mg tablet Take 1 tablet by mouth once daily (Patient taking differently: Take 5 mg by mouth once daily.)    loratadine (CLARITIN) 10 mg tablet Take 10 mg by mouth daily as needed.    pioglitazone (ACTOS) 30 MG tablet Take 1 tablet (30 mg total) by mouth once daily.    rosuvastatin (CRESTOR) 20 MG tablet Take 20 mg by mouth every evening.    semaglutide (OZEMPIC) 0.25 mg or 0.5 mg(2 mg/1.5 mL) pen injector Inject into the skin every 7 days.    sodium chloride 0.9% (NORMAL SALINE FLUSH) injection Inject 10 mLs into the vein every 6 (six) hours.    sodium chloride 0.9% (NORMAL SALINE FLUSH) injection Inject 10 mLs into the vein as needed.    glipiZIDE (GLUCOTROL) 10 MG TR24 Take 1 tablet (10 mg total) by mouth daily with breakfast.     No current facility-administered medications for this visit.     Facility-Administered Medications Ordered in Other Visits   Medication    lactated ringers infusion       Review of patient's allergies indicates:   Allergen Reactions    Clindamycin Rash    Levofloxacin Hives       Family History   Problem Relation Name Age of Onset    Coronary artery disease Mother      Cancer Father Chris noer sr.     Heart disease Father Chris cee sr.     Thyroid disease Sister         Social History     Socioeconomic History    Marital status:     Tobacco Use    Smoking status: Never    Smokeless tobacco: Never   Substance and Sexual Activity    Alcohol use: Not Currently    Drug use: Not Currently     Types: Marijuana    Sexual activity: Yes     Partners: Female           Review of Systems:    Constitution: Negative for chills, fever, and sweats.  Negative for unexplained weight loss.    HENT:  Negative for headaches and blurry vision.    Cardiovascular:Negative for chest pain or irregular heart beat. Negative for hypertension.    Respiratory:  Negative for cough and shortness of breath.    Gastrointestinal: Negative for abdominal pain, heartburn, melena, nausea, and vomitting.    Genitourinary:  Negative bladder incontinence and dysuria.    Musculoskeletal:  See HPI    Neurological: Negative for numbness.    Psychiatric/Behavioral: Negative for depression.  The patient is not nervous/anxious.      Endocrine: Negative for polyuria    Hematologic/Lymphatic: Negative for bleeding problem.  Does not bruise/bleed easily.    Skin: Negative for poor would healing and rash      Physical Examination:    Vital Signs:    Vitals:    06/11/24 0907   BP: 110/66   Pulse: 75       Body mass index is 44.62 kg/m².    General: No acute distress, alert and oriented, healthy appearing    HEENT: Head is atraumatic, mucous membranes are moist    Neck: Supples, no JVD    Cardiovascular: Palpable dorsalis pedis and posterior tibial pulses, regular rate and rhythm to those pulses    Lungs: Breathing non-labored    Skin: no rashes appreciated    Neurologic: Can flex and extend knees, ankles, and toes. Sensation is grossly intact    Right hip:  Range of motion of bilateral hips without significant or severe pain right side.  Sensation intact distally.    Bilateral knees:  Crepitus range of motion of both knees.  Brisk cap refill disappeared sensation intact distally.    X-rays:  Three views right hip reviewed.  Patient's implants appear well fixed.  No signs of loosening or  subsidence noted     Assessment::  Status post revision right hip for infection  Bilateral knee osteoarthritis    Plan:  Hip is doing well.  We will get an inject both knees today.  See him back in 2 months.  Repeat x-rays of the hip when he returns.    This note was generated with the assistance of ambient listening technology. Verbal consent was obtained by the patient and accompanying visitor(s) for the recording of patient appointment to facilitate this note. I attest to having reviewed and edited the generated note for accuracy, though some syntax or spelling errors may persist. Please contact the author of this note for any clarification.      This note was created using orderbolt voice recognition software that occasionally misinterpreted phrases or words.    Consult note is delivered via Epic messaging service.

## 2024-06-12 ENCOUNTER — OFFICE VISIT (OUTPATIENT)
Dept: INFECTIOUS DISEASES | Facility: CLINIC | Age: 79
End: 2024-06-12
Payer: MEDICARE

## 2024-06-12 ENCOUNTER — TELEPHONE (OUTPATIENT)
Dept: FAMILY MEDICINE | Facility: CLINIC | Age: 79
End: 2024-06-12
Payer: MEDICARE

## 2024-06-12 VITALS
HEART RATE: 78 BPM | DIASTOLIC BLOOD PRESSURE: 65 MMHG | OXYGEN SATURATION: 97 % | BODY MASS INDEX: 42.66 KG/M2 | WEIGHT: 315 LBS | HEIGHT: 72 IN | RESPIRATION RATE: 18 BRPM | SYSTOLIC BLOOD PRESSURE: 107 MMHG

## 2024-06-12 DIAGNOSIS — N18.31 TYPE 2 DIABETES MELLITUS WITH STAGE 3A CHRONIC KIDNEY DISEASE, WITHOUT LONG-TERM CURRENT USE OF INSULIN: ICD-10-CM

## 2024-06-12 DIAGNOSIS — T84.59XD PROSTHETIC HIP INFECTION, SUBSEQUENT ENCOUNTER: ICD-10-CM

## 2024-06-12 DIAGNOSIS — Z96.649 PROSTHETIC HIP INFECTION, SUBSEQUENT ENCOUNTER: ICD-10-CM

## 2024-06-12 DIAGNOSIS — E11.22 TYPE 2 DIABETES MELLITUS WITH STAGE 3A CHRONIC KIDNEY DISEASE, WITHOUT LONG-TERM CURRENT USE OF INSULIN: ICD-10-CM

## 2024-06-12 DIAGNOSIS — N18.31 STAGE 3A CHRONIC KIDNEY DISEASE: Primary | ICD-10-CM

## 2024-06-12 PROCEDURE — 99214 OFFICE O/P EST MOD 30 MIN: CPT | Mod: PBBFAC | Performed by: NURSE PRACTITIONER

## 2024-06-12 PROCEDURE — 99999 PR PBB SHADOW E&M-EST. PATIENT-LVL IV: CPT | Mod: PBBFAC,,, | Performed by: NURSE PRACTITIONER

## 2024-06-12 PROCEDURE — 99213 OFFICE O/P EST LOW 20 MIN: CPT | Mod: S$PBB,,, | Performed by: NURSE PRACTITIONER

## 2024-06-12 NOTE — PROGRESS NOTES
[facility]    HISTORY OF PRESENT ILLNESS:     Chris Mendoza II is a 78 y.o. male here today for f/u    Initial hospital encounter  78-year-old male patient known to have past medical history significant for DM 2, obesity, gout, prior COLLIN, complicated by MRSA infection s/p revision 05/11/2020, completed 6 weeks of IV vancomycin which was complicated by DAGOBERTO, transitioned to daptomycin, completed 6 weeks of antibiotics and patient did fairly well, since then he has been doing relatively okay.  However in 02/2024 he started having worsening pain in the right hip, followed up with his orthopedic surgeon, had aspiration done 02/05/2024, this grew MRSA, patient was given course of doxycycline however no significant improvement, presented to the hospital for revision of COLLIN.  He underwent revision on 04/08/2024 with revision of the right total hip arthroplasty both components, placement of antibiotic delivery device, complete synovectomy of the right hip.  Intraoperative cultures growing MRSA again.  ID consulted for assistance in management.  Antibiotics:  Cefazolin 04/08 - 04/09  Vancomycin 04/08 - 04/12/2024       Septic prosthetic right hip   MRSA infection   Prior MRSA infection of same hip  Dm 2  Obesity      Plan:  -discontinue vancomycin given patient's previous history of DAGOBERTO while on this medicine   -start daptomycin 8 milligrams/kilograms IV Q 24 hours  -will need 6 weeks day of surgery 04/08/2024   -anticipated end date 05/20/2024   -after completion of IV antibiotics, would recommend a p.o. tail for at least 2-3 months depending on surgical plans and patient's clinical improvement  -add on CPK to current labs   -Please monitor weekly CBC, CMP, ESR, CRP and CPK Fax results to my office at [phone]  -will set up follow-up with us in the office in 2-3 weeks  -discussed with patient, wife, nursing, primary team attending    5/9/2024  Here for f/u accompanied by wife.  No complaints.  Doing well  in therapy.   Remains on daptomycin, recent labs good.    6/12/2024  Here for f/u.  Dierks out, R hip surgery site healed.  Compliant with doxycycline suppression, no issues.  No complaints today.        Past Medical History:   Diagnosis Date    Arthritis     Atrial fibrillation     Bladder cancer     CKD (chronic kidney disease)     Diabetes mellitus, type 2     Gout, unspecified     Hyperlipidemia     Hypertension     Restless leg syndrome     Seasonal allergies     Sleep apnea, unspecified     Thyroid disease         Past Surgical History:   Procedure Laterality Date    ARTHROGRAM Right 02/15/2024    Procedure: ARTHROGRAM  aspiration right hip with anesthesia #1;  Surgeon: Jose Luna MD;  Location: [facility] OR;  Service: Orthopedics;  Laterality: Right;  aspiration right hip with anesthesia    BLADDER SURGERY  2009    removed Ca    CARDIAC CATHETERIZATION      CARDIOVERSION      CATARACT EXTRACTION W/  INTRAOCULAR LENS IMPLANT Bilateral     JOINT REPLACEMENT Right     Total Hip    MULTIPLE TOOTH EXTRACTIONS      REVISION TOTAL HIP ARTHROPLASTY Right 4/8/2024    Procedure: REVISION, TOTAL ARTHROPLASTY, HIP - revision R COLLIN with abx spacer;  Surgeon: Jose Luna MD;  Location: [facility] OR;  Service: Orthopedics;  Laterality: Right;  revision R COLLIN with abx spacer    VEIN SURGERY          Social History     Tobacco Use    Smoking status: Never    Smokeless tobacco: Never   Substance and Sexual Activity    Alcohol use: Not Currently    Drug use: Not Currently     Types: Marijuana    Sexual activity: Yes     Partners: Female        Review of patient's allergies indicates:   Allergen Reactions    Clindamycin Rash    Levofloxacin Hives        Patient Care Team:  Shobha Mcwilliams FNP as PCP - General (Nurse Practitioner)  Kenyon Lara II, MD as Consulting Physician (Ophthalmology)  Dioog Malloy MD as Consulting Physician (Cardiovascular Disease)  Anthony Miller OD (Optometry)  Jose Luna MD  as Consulting Physician (Orthopedic Surgery)     MEDICATIONS:     Current Outpatient Medications   Medication Instructions    allopurinoL (ZYLOPRIM) 300 MG tablet Take 1 tablet by mouth twice daily    DAPTOmycin (CUBICIN) 500 mg injection Intravenous    doxazosin (CARDURA) 4 MG tablet Take 1 tablet by mouth once daily    doxycycline (VIBRA-TABS) 100 mg, Oral, Daily    ELIQUIS 5 mg, Oral, 2 times daily    finasteride (PROSCAR) 5 mg tablet Take 1 tablet by mouth once daily    glipiZIDE (GLUCOTROL) 10 mg, Oral, With breakfast    loratadine (CLARITIN) 10 mg, Oral, Daily PRN    pioglitazone (ACTOS) 30 mg, Oral, Daily    rosuvastatin (CRESTOR) 20 mg, Oral, Nightly    semaglutide (OZEMPIC) 0.25 mg or 0.5 mg(2 mg/1.5 mL) pen injector Subcutaneous, Every 7 days    sodium chloride 0.9% (NORMAL SALINE FLUSH) injection 10 mLs, Intravenous, Every 6 hours    sodium chloride 0.9% (NORMAL SALINE FLUSH) injection 10 mLs, Intravenous, As needed (PRN)       REVIEW OF SYSTEMS:   Except as documented in the HPI, all other systems reviewed and negative. See HPI for details.     PHYSICAL EXAM:     Visit Vitals  /65 (BP Location: Left arm)   Pulse 78   Resp 18   Ht 6' (1.829 m)   Wt (!) 149.2 kg (328 lb 14.8 oz)   SpO2 97%   BMI 44.61 kg/m²       GENERAL: NAD; does not appear toxic  SKIN: no rash  HEENT: sclera non-icteric; PERRL   NECK: supple; no LAD  CHEST: CTA; nonlabored, equal expansion; no adventitious BS  CARDIOVASCULAR: RRR, S1S2; no murmur; strong, equal peripheral pulses; no edema  ABDOMEN:  active bowel sounds; abdomen soft, nondistended, nontender to palpation  EXTREMITIES: no cyanosis or clubbing; R hip incision healed  NEURO: AAO x4; CN II-XII grossly intact  PSYCH: Mentation and affect appropriate     LABS AND IMAGING:     Independently reviewed all laboratory and diagnostic studies relevant to infectious disease.    ASSESSMENT:     Problem List Items Addressed This Visit          Renal/    Chronic kidney disease -  Primary       Endocrine    Type 2 diabetes mellitus with chronic kidney disease, without long-term current use of insulin       Other    Prosthetic hip infection, subsequent encounter          PLAN:   Completed daptomycin on 5/20 and started on doxycycline 100mg PO BID.   Planning at least a 3 month tail of doxycycline, tentative end date: 8/2024.  F/u with us 8/20 - has f/u with ortho the same day.  Discussed with patient and wife.       CHARO Aly  Infectious Diseases

## 2024-06-12 NOTE — TELEPHONE ENCOUNTER
----- Message from Dedra Mix sent at 6/12/2024  7:38 AM CDT -----  Regarding: lab  .Type:  Needs Medical Advice    Who Called: pt  Symptoms (please be specific):    How long has patient had these symptoms:    Pharmacy name and phone #:    Would the patient rather a call back or a response via [facility]?   Best Call Back Number: [phone]  Additional Information: pt has had steroid injections - questions about blood work for labs

## 2024-06-13 ENCOUNTER — LAB VISIT (OUTPATIENT)
Dept: LAB | Facility: HOSPITAL | Age: 79
End: 2024-06-13
Attending: NURSE PRACTITIONER
Payer: MEDICARE

## 2024-06-13 ENCOUNTER — TELEPHONE (OUTPATIENT)
Dept: FAMILY MEDICINE | Facility: CLINIC | Age: 79
End: 2024-06-13
Payer: MEDICARE

## 2024-06-13 DIAGNOSIS — N18.31 TYPE 2 DIABETES MELLITUS WITH STAGE 3A CHRONIC KIDNEY DISEASE, WITHOUT LONG-TERM CURRENT USE OF INSULIN: ICD-10-CM

## 2024-06-13 DIAGNOSIS — E78.5 HYPERLIPIDEMIA, UNSPECIFIED HYPERLIPIDEMIA TYPE: ICD-10-CM

## 2024-06-13 DIAGNOSIS — N18.31 TYPE 2 DIABETES MELLITUS WITH STAGE 3A CHRONIC KIDNEY DISEASE, WITHOUT LONG-TERM CURRENT USE OF INSULIN: Primary | ICD-10-CM

## 2024-06-13 DIAGNOSIS — E11.22 TYPE 2 DIABETES MELLITUS WITH STAGE 3A CHRONIC KIDNEY DISEASE, WITHOUT LONG-TERM CURRENT USE OF INSULIN: Primary | ICD-10-CM

## 2024-06-13 DIAGNOSIS — I10 PRIMARY HYPERTENSION: ICD-10-CM

## 2024-06-13 DIAGNOSIS — E11.22 TYPE 2 DIABETES MELLITUS WITH STAGE 3A CHRONIC KIDNEY DISEASE, WITHOUT LONG-TERM CURRENT USE OF INSULIN: ICD-10-CM

## 2024-06-13 LAB
ALBUMIN SERPL-MCNC: 3.9 G/DL (ref 3.4–4.8)
ALBUMIN/GLOB SERPL: 1.4 RATIO (ref 1.1–2)
ALP SERPL-CCNC: 66 UNIT/L (ref 40–150)
ALT SERPL-CCNC: 17 UNIT/L (ref 0–55)
ANION GAP SERPL CALC-SCNC: 7 MEQ/L
AST SERPL-CCNC: 13 UNIT/L (ref 5–34)
BASOPHILS # BLD AUTO: 0 X10(3)/MCL
BASOPHILS NFR BLD AUTO: 0 %
BILIRUB SERPL-MCNC: 0.6 MG/DL
BUN SERPL-MCNC: 24.1 MG/DL (ref 8.4–25.7)
CALCIUM SERPL-MCNC: 9.3 MG/DL (ref 8.8–10)
CHLORIDE SERPL-SCNC: 114 MMOL/L (ref 98–107)
CHOLEST SERPL-MCNC: 99 MG/DL
CHOLEST/HDLC SERPL: 3 {RATIO} (ref 0–5)
CO2 SERPL-SCNC: 21 MMOL/L (ref 23–31)
CREAT SERPL-MCNC: 1.15 MG/DL (ref 0.73–1.18)
CREAT/UREA NIT SERPL: 21
EOSINOPHIL # BLD AUTO: 0 X10(3)/MCL (ref 0–0.9)
EOSINOPHIL NFR BLD AUTO: 0 %
ERYTHROCYTE [DISTWIDTH] IN BLOOD BY AUTOMATED COUNT: 16 % (ref 11.5–17)
EST. AVERAGE GLUCOSE BLD GHB EST-MCNC: 91.1 MG/DL
GFR SERPLBLD CREATININE-BSD FMLA CKD-EPI: >60 ML/MIN/1.73/M2
GLOBULIN SER-MCNC: 2.8 GM/DL (ref 2.4–3.5)
GLUCOSE SERPL-MCNC: 60 MG/DL (ref 82–115)
HBA1C MFR BLD: 4.8 %
HCT VFR BLD AUTO: 37.8 % (ref 42–52)
HDLC SERPL-MCNC: 37 MG/DL (ref 35–60)
HGB BLD-MCNC: 11.8 G/DL (ref 14–18)
IMM GRANULOCYTES # BLD AUTO: 0.03 X10(3)/MCL (ref 0–0.04)
IMM GRANULOCYTES NFR BLD AUTO: 0.4 %
LDLC SERPL CALC-MCNC: 52 MG/DL (ref 50–140)
LYMPHOCYTES # BLD AUTO: 0.71 X10(3)/MCL (ref 0.6–4.6)
LYMPHOCYTES NFR BLD AUTO: 8.8 %
MCH RBC QN AUTO: 28.3 PG (ref 27–31)
MCHC RBC AUTO-ENTMCNC: 31.2 G/DL (ref 33–36)
MCV RBC AUTO: 90.6 FL (ref 80–94)
MONOCYTES # BLD AUTO: 0.38 X10(3)/MCL (ref 0.1–1.3)
MONOCYTES NFR BLD AUTO: 4.7 %
NEUTROPHILS # BLD AUTO: 6.99 X10(3)/MCL (ref 2.1–9.2)
NEUTROPHILS NFR BLD AUTO: 86.1 %
PLATELET # BLD AUTO: 132 X10(3)/MCL (ref 130–400)
PMV BLD AUTO: 11 FL (ref 7.4–10.4)
POTASSIUM SERPL-SCNC: 3.7 MMOL/L (ref 3.5–5.1)
PROT SERPL-MCNC: 6.7 GM/DL (ref 5.8–7.6)
RBC # BLD AUTO: 4.17 X10(6)/MCL (ref 4.7–6.1)
SODIUM SERPL-SCNC: 142 MMOL/L (ref 136–145)
TRIGL SERPL-MCNC: 51 MG/DL (ref 34–140)
VLDLC SERPL CALC-MCNC: 10 MG/DL
WBC # SPEC AUTO: 8.11 X10(3)/MCL (ref 4.5–11.5)

## 2024-06-13 PROCEDURE — 80053 COMPREHEN METABOLIC PANEL: CPT

## 2024-06-13 PROCEDURE — 80061 LIPID PANEL: CPT

## 2024-06-13 PROCEDURE — 85025 COMPLETE CBC W/AUTO DIFF WBC: CPT

## 2024-06-13 PROCEDURE — 83036 HEMOGLOBIN GLYCOSYLATED A1C: CPT

## 2024-06-13 PROCEDURE — 36415 COLL VENOUS BLD VENIPUNCTURE: CPT

## 2024-06-13 NOTE — TELEPHONE ENCOUNTER
Are there any outstanding tasks in patient's chart?    labs  2. Do we have outstanding/pending referrals?    n  3. Has the patient been seen in an ER, Urgent Care, or admitted since last visit?    n  4. Has patient seen any other health care providers since last visit?    Dr. Luna  5.  Has patient had any blood work or x-rays done since last visit?   Will complete labs at [facility]

## 2024-06-13 NOTE — PROGRESS NOTES
Labs reviewed, blood sugar low at 60, hemoglobin A1c way down, this has great but we need to stop his glipizide.  He is taking Ozempic that he is getting through patient assistance program which has really helped his blood sugars.  I know he is seeing me next week but please call him and have him stop his glipizide completely.

## 2024-06-20 ENCOUNTER — OFFICE VISIT (OUTPATIENT)
Dept: FAMILY MEDICINE | Facility: CLINIC | Age: 79
End: 2024-06-20
Payer: MEDICARE

## 2024-06-20 VITALS
OXYGEN SATURATION: 97 % | SYSTOLIC BLOOD PRESSURE: 128 MMHG | RESPIRATION RATE: 16 BRPM | TEMPERATURE: 98 F | HEART RATE: 84 BPM | HEIGHT: 72 IN | WEIGHT: 315 LBS | DIASTOLIC BLOOD PRESSURE: 76 MMHG | BODY MASS INDEX: 42.66 KG/M2

## 2024-06-20 DIAGNOSIS — Z00.00 MEDICARE ANNUAL WELLNESS VISIT, SUBSEQUENT: Primary | ICD-10-CM

## 2024-06-20 DIAGNOSIS — N18.31 STAGE 3A CHRONIC KIDNEY DISEASE: ICD-10-CM

## 2024-06-20 DIAGNOSIS — E78.2 MIXED HYPERLIPIDEMIA: ICD-10-CM

## 2024-06-20 DIAGNOSIS — E11.22 TYPE 2 DIABETES MELLITUS WITH STAGE 3A CHRONIC KIDNEY DISEASE, WITHOUT LONG-TERM CURRENT USE OF INSULIN: ICD-10-CM

## 2024-06-20 DIAGNOSIS — Z12.5 PROSTATE CANCER SCREENING: ICD-10-CM

## 2024-06-20 DIAGNOSIS — Z96.649 FAILED TOTAL HIP ARTHROPLASTY, INITIAL ENCOUNTER: ICD-10-CM

## 2024-06-20 DIAGNOSIS — Z71.89 ADVANCED CARE PLANNING/COUNSELING DISCUSSION: ICD-10-CM

## 2024-06-20 DIAGNOSIS — I10 PRIMARY HYPERTENSION: ICD-10-CM

## 2024-06-20 DIAGNOSIS — N18.31 TYPE 2 DIABETES MELLITUS WITH STAGE 3A CHRONIC KIDNEY DISEASE, WITHOUT LONG-TERM CURRENT USE OF INSULIN: ICD-10-CM

## 2024-06-20 DIAGNOSIS — E66.01 MORBID OBESITY: ICD-10-CM

## 2024-06-20 DIAGNOSIS — T84.018A FAILED TOTAL HIP ARTHROPLASTY, INITIAL ENCOUNTER: ICD-10-CM

## 2024-06-20 DIAGNOSIS — I48.20 CHRONIC ATRIAL FIBRILLATION: ICD-10-CM

## 2024-06-20 RX ORDER — SEMAGLUTIDE 1.34 MG/ML
1 INJECTION, SOLUTION SUBCUTANEOUS
COMMUNITY

## 2024-06-20 NOTE — PROGRESS NOTES
Patient ID: [id number]     Chief Complaint: Medicare AWV      HPI:     Chris Mendoza II is a 78 y.o. male here today for a Medicare Wellness. No other complaints today.       -------------------------------------    Arthritis    Atrial fibrillation    Bladder cancer    CKD (chronic kidney disease)    Diabetes mellitus, type 2    Gout, unspecified    Hyperlipidemia    Hypertension    Restless leg syndrome    Seasonal allergies    Sleep apnea, unspecified    Thyroid disease        Past Surgical History:   Procedure Laterality Date    ARTHROGRAM Right 02/15/2024    Procedure: ARTHROGRAM  aspiration right hip with anesthesia #1;  Surgeon: Jose Luna MD;  Location: [facility] OR;  Service: Orthopedics;  Laterality: Right;  aspiration right hip with anesthesia    BLADDER SURGERY  2009    removed Ca    CARDIAC CATHETERIZATION      CARDIOVERSION      CATARACT EXTRACTION W/  INTRAOCULAR LENS IMPLANT Bilateral     JOINT REPLACEMENT Right     Total Hip    MULTIPLE TOOTH EXTRACTIONS      REVISION TOTAL HIP ARTHROPLASTY Right 4/8/2024    Procedure: REVISION, TOTAL ARTHROPLASTY, HIP - revision R COLLIN with abx spacer;  Surgeon: Jose Luna MD;  Location: [facility] OR;  Service: Orthopedics;  Laterality: Right;  revision R COLLIN with abx spacer    VEIN SURGERY         Review of patient's allergies indicates:   Allergen Reactions    Clindamycin Rash    Levofloxacin Hives       Outpatient Medications Marked as Taking for the 6/20/24 encounter (Office Visit) with Shobha Mcwilliams FNP   Medication Sig Dispense Refill    allopurinoL (ZYLOPRIM) 300 MG tablet Take 1 tablet by mouth twice daily 180 tablet 0    doxazosin (CARDURA) 4 MG tablet Take 1 tablet by mouth once daily 90 tablet 2    doxycycline (VIBRA-TABS) 100 MG tablet Take 1 tablet (100 mg total) by mouth once daily. 60 tablet 5    ELIQUIS 5 mg Tab Take 5 mg by mouth 2 (two) times daily.      finasteride (PROSCAR) 5 mg tablet Take 1 tablet by mouth once daily (Patient taking  differently: Take 5 mg by mouth once daily.) 90 tablet 1    loratadine (CLARITIN) 10 mg tablet Take 10 mg by mouth daily as needed.      rosuvastatin (CRESTOR) 20 MG tablet Take 20 mg by mouth every evening.      semaglutide (OZEMPIC) 1 mg/dose (4 mg/3 mL) Inject 1 mg into the skin every 7 days.      [DISCONTINUED] pioglitazone (ACTOS) 30 MG tablet Take 1 tablet (30 mg total) by mouth once daily. 90 tablet 3       Social History     Socioeconomic History    Marital status:    Tobacco Use    Smoking status: Never    Smokeless tobacco: Never   Substance and Sexual Activity    Alcohol use: Not Currently    Drug use: Not Currently     Types: Marijuana    Sexual activity: Yes     Partners: Female        Family History   Problem Relation Name Age of Onset    Coronary artery disease Mother      Cancer Father Chris cee sr.     Heart disease Father Chris cee sr.     Thyroid disease Sister          Patient Care Team:  Shobha Mcwilliams FNP as PCP - General (Nurse Practitioner)  Kenyon Lara II, MD as Consulting Physician (Ophthalmology)  Diogo Malloy MD as Consulting Physician (Cardiovascular Disease)  Anthony Miller OD (Optometry)  Jose Luna MD as Consulting Physician (Orthopedic Surgery)       Subjective:     Review of Systems   Constitutional:  Positive for malaise/fatigue.   HENT: Negative.     Eyes: Negative.    Respiratory: Negative.     Cardiovascular: Negative.    Gastrointestinal: Negative.    Genitourinary: Negative.    Musculoskeletal:  Positive for joint pain.   Skin: Negative.    Neurological: Negative.    Endo/Heme/Allergies: Negative.    Psychiatric/Behavioral: Negative.     All other systems reviewed and are negative.        Patient Reported Health Risk Assessment  What [age] 70-79  Are you male or female?: Male  During the past four weeks, how much have you been bothered by emotional problems such as feeling anxious, depressed, irritable, sad, or downhearted and  blue?: Slightly  During the past five weeks, has your physical and/or emotional health limited your social activities with family, friends, neighbors, or groups?: Not at all  During the past four weeks, how much bodily pain have you generally had?: Moderate pain  During the past four weeks, was someone available to help if you needed and wanted help?: Yes, as much as I wanted  During the past four weeks, what was the hardest physical activity you could do for at least two minutes?: Light  Can you get to places out of walking distance without help?  (For example, can you travel alone on buses or taxis, or drive your own car?): No  Can you go shopping for groceries or clothes without someone's help?: No  Can you prepare your own meals?: Yes  Can you do your own housework without help?: Yes  Because of any health problems, do you need the help of another person with your personal care needs such as eating, bathing, dressing, or getting around the house?: No  Can you handle your own money without help?: Yes  During the past four weeks, how would you rate your health in general?: Good  How have things been going for you during the past four weeks?: Pretty well  Are you having difficulties driving your car?: Not applicable, I do not use a car  Do you always fasten your seat belt when you are in a car?: Yes, usually  How often in the past four weeks have you been bothered by falling or dizzy when standing up?: Never  How often in the past four weeks have you been bothered by sexual problems?: Never  How often in the past four weeks have you been bothered by trouble eating well?: Never  How often in the past four weeks have you been bothered by teeth or denture problems?: Never  How often in the past four weeks have you been bothered with problems using the telephone?: Never  How often in the past four weeks have you been bothered by tiredness or fatigue?: Never  Have you fallen two or more times in the past year?: No  Are  you afraid of falling?: No  Are you a smoker?: No  During the past four weeks, how many drinks of wine, beer, or other alcoholic beverages did you have?: No alcohol at all  Do you exercise for about 20 minutes three or more days a week?: No, I usually do not exercise this much  Have you been given any information to help you with hazards in your house that might hurt you?: Yes  Have you been given any information to help you with keeping track of your medications?: Yes  How often do you have trouble taking medicines the way you've been told to take them?: I always take them as prescribed  How confident are you that you can control and manage most of your health problems?: Very confident  What is your race? (Check all that apply.):     Opioid Screening: Patient medication list reviewed, patient is not taking prescription opioids. Patient is not using additional opioids than prescribed. Patient is at low risk of substance abuse based on this opioid use history.      A personalized 5-10 year written screening schedule and personal prevention plan has been developed using the USPSTF age appropriate recommendations and this was provided to the patient at the end of todays visit. Please see the AVS for those details     Objective:     /76 (BP Location: Left arm)   Pulse 84   Temp 97.8 °F (36.6 °C) (Oral)   Resp 16   Ht 6' (1.829 m)   Wt (!) 152 kg (335 lb)   SpO2 97%   BMI 45.43 kg/m²     Physical Exam  Constitutional:       Appearance: Normal appearance. He is obese.   HENT:      Head: Normocephalic and atraumatic.      Right Ear: Tympanic membrane, ear canal and external ear normal.      Left Ear: Tympanic membrane, ear canal and external ear normal.      Nose: Nose normal.      Mouth/Throat:      Mouth: Mucous membranes are moist.      Pharynx: Oropharynx is clear.   Eyes:      Extraocular Movements: Extraocular movements intact.      Conjunctiva/sclera: Conjunctivae normal.      Pupils: Pupils  are equal, round, and reactive to light.   Cardiovascular:      Rate and Rhythm: Normal rate and regular rhythm.      Pulses: Normal pulses.           Dorsalis pedis pulses are 2+ on the right side and 2+ on the left side.        Posterior tibial pulses are 2+ on the right side and 2+ on the left side.      Heart sounds: Normal heart sounds.   Pulmonary:      Effort: Pulmonary effort is normal.      Breath sounds: Normal breath sounds.   Abdominal:      General: Abdomen is flat. Bowel sounds are normal.      Palpations: Abdomen is soft.   Musculoskeletal:      Cervical back: Normal range of motion.      Comments: Ambulating with the assistance of walker   Feet:      Right foot:      Protective Sensation: 5 sites tested.  5 sites sensed.      Skin integrity: Skin integrity normal.      Toenail Condition: Right toenails are normal.      Left foot:      Protective Sensation: 5 sites tested.  5 sites sensed.      Skin integrity: Skin integrity normal.      Toenail Condition: Left toenails are normal.   Skin:     General: Skin is warm and dry.   Neurological:      General: No focal deficit present.      Mental Status: He is alert and oriented to person, place, and time.   Psychiatric:         Mood and Affect: Mood normal.         Behavior: Behavior normal.         Thought Content: Thought content normal.         Judgment: Judgment normal.                No data to display                  6/20/2024     1:20 PM 6/12/2024     3:00 PM 6/11/2024     9:00 AM 5/9/2024    10:30 AM 5/9/2024     9:00 AM 4/25/2024     1:15 PM 3/26/2024     9:30 AM   Fall Risk Assessment - Outpatient   Mobility Status Ambulatory w/ assistance Ambulatory Ambulatory w/ assistance Wheelchair Bound Ambulatory w/ assistance Ambulatory Ambulatory w/ assistance   Number of falls 0 0 0 0 0 0 0   Identified as fall risk True False True True True False True           Depression Screening  Over the past two weeks, has the patient felt down, depressed, or  hopeless?: No  Over the past two weeks, has the patient felt little interest or pleasure in doing things?: No  Functional Ability/Safety Screening  Was the patient's timed Up & Go test unsteady or longer than 30 seconds?: No  Does the patient need help with phone, transportation, shopping, preparing meals, housework, laundry, meds, or managing money?: No  Does the patient's home have rugs in the hallway, lack grab bars in the bathroom, lack handrails on the stairs or have poor lighting?: No  Have you noticed any hearing difficulties?: No  Cognitive Function (Assessed through direct observation with due consideration of information obtained by way of patient reports and/or concerns raised by family, friends, caretakers, or others)    Does the patient repeat questions/statements in the same day?: No  Does the patient have trouble remembering the date, year, and time?: No  Does the patient have difficulty managing finances?: No  Does the patient have a decreased sense of direction?: No  Assessment and Plan       ICD-10-CM ICD-9-CM   1. Medicare annual wellness visit, subsequent  Z00.00 V70.0   2. Advanced care planning/counseling discussion  Z71.89 V65.49   3. Primary hypertension  I10 401.9   4. Mixed hyperlipidemia  E78.2 272.2   5. Chronic atrial fibrillation  I48.20 427.31   6. Prostate cancer screening  Z12.5 V76.44   7. Stage 3a chronic kidney disease  N18.31 585.3   8. Type 2 diabetes mellitus with stage 3a chronic kidney disease, without long-term current use of insulin  E11.22 250.40    N18.31 585.3   9. Morbid obesity  E66.01 278.01   10. Failed total hip arthroplasty, initial encounter  T84.018A 996.47    Z96.649 V43.64     1. Medicare annual wellness visit, subsequent  Overview:  Medicare annual wellness visit yearly in June      2. Advanced care planning/counseling discussion  Assessment & Plan:  ACP documents reviewed, no changes requested.      3. Primary hypertension  Overview:  Previously Triamterene  HCTZ 37.5/25 mg 2 capsules daily    Assessment & Plan:  No longer on triamterene/hydrochlorothiazide, unsure who is stopped it but blood pressure stable without it.      4. Mixed hyperlipidemia  Overview:  Rosuvastatin 20 mg daily    Assessment & Plan:  Stable, total cholesterol 99, LDL 52, continue rosuvastatin 20 mg daily, follow-up 6 months.      5. Chronic atrial fibrillation  Overview:  Dr Gama Chaney 5 mg twice daily    Assessment & Plan:  Stable, continue management per Dr. Malloy, follow-up 6 months      6. Prostate cancer screening  Overview:  PSA yearly in September    Assessment & Plan:  PSA in 3 months.    Orders:  -     PSA, Screening; Future; Expected date: 09/20/2024    7. Stage 3a chronic kidney disease  Assessment & Plan:  Stable, BUN 24.1, creatinine 1.15, EGFR greater than 60.  Repeat in 3 months.    Orders:  -     Comprehensive Metabolic Panel; Future; Expected date: 09/20/2024    8. Type 2 diabetes mellitus with stage 3a chronic kidney disease, without long-term current use of insulin  Overview:  Previously on Glipizide XL 2.5 mg daily    03/08/2022 - Hgb A1c 7.6, increase Glipizide XL to 5 mg daily  09/14/2022 - Hgb A1c 7.9, add Tradjenta 5 mg daily  12/13/2022 - Hgb A1c 8.6, never started Tradjenta due to cost. Increase Glipizide XL to 10 mg daily. Refer to Dr Amos   03/16/2023 - Hgb A1c 6.6, never went to endocrinology, did not increase Glipizide to 10 mg daily, new RX sent today for Glipizide XL 10 mg daily  09/21/2023 - Hgb A1c 7.9, add pioglitazone 30 mg daily  11/2023 - Ozempic patient assistance program approved, start Ozempic  06/13/2024 - Hgb A1c 4.8, DC glipizide and pioglitazone      Assessment & Plan:  Hemoglobin A1c 4.8.  Blood sugar was low at 60 on recent labs.  Patient was notified on the 13th to stop the glipizide, he stopped the pioglitazone as well.  Will keep him on just Ozempic for now and return to clinic in 3 months with labs prior.  Encouraged to monitor for  any signs and symptoms of low blood sugar.    Orders:  -     Comprehensive Metabolic Panel; Future; Expected date: 09/20/2024  -     Hemoglobin A1C; Future; Expected date: 09/20/2024    9. Morbid obesity  Assessment & Plan:  Continue Ozempic for type 2 diabetes, continue with low-calorie diet.      10. Failed total hip arthroplasty, initial encounter  Overview:  MRSA in hip 02/15/2024    Right hip revision per Dr. Luna 04/08/2024 - surgical culture negative     Assessment & Plan:  Doing much better since hip revision.  Continue follow-up with ortho.                Medicare Annual Wellness and Personalized Prevention Plan:   Fall Risk + Home Safety + Hearing Impairment + Depression Screen + Cognitive Impairment Screen + Health Risk Assessment all reviewed.       Health Maintenance Topics with due status: Not Due       Topic Last Completion Date    TETANUS VACCINE 08/19/2014    Diabetes Urine Screening 09/12/2023    Eye Exam 09/21/2023    Lipid Panel 06/13/2024    Hemoglobin A1c 06/13/2024      The patient's Health Maintenance was reviewed and the following appears to be due at this time:   Health Maintenance Due   Topic Date Due    Foot Exam  Never done         Advance Care Planning     Date: 06/20/2024    ACP Reviewed/No Changes  Voluntary advance care planning discussion had today with patient. Previously completed HCPOA in electronic medical record is current, no changes made.      A total of 20 min was spent on advance care planning, goals of care discussion, emotional support, formulating and communicating prognosis and exploring burden/benefit of various approaches of treatment. This discussion occurred on a fully voluntary basis with the verbal consent of the patient and/or family.          Opioid Screening: Patient medication list reviewed, patient is not taking prescription opioids. Patient is not using additional opioids than prescribed. Patient is at low risk of substance abuse based on this opioid use  history.     Medication List with Changes/Refills   Current Medications    ALLOPURINOL (ZYLOPRIM) 300 MG TABLET    Take 1 tablet by mouth twice daily       Start Date: 5/23/2024 End Date: --    DOXAZOSIN (CARDURA) 4 MG TABLET    Take 1 tablet by mouth once daily       Start Date: 5/15/2024 End Date: --    DOXYCYCLINE (VIBRA-TABS) 100 MG TABLET    Take 1 tablet (100 mg total) by mouth once daily.       Start Date: 5/9/2024  End Date: --    ELIQUIS 5 MG TAB    Take 5 mg by mouth 2 (two) times daily.       Start Date: 3/7/2022  End Date: --    FINASTERIDE (PROSCAR) 5 MG TABLET    Take 1 tablet by mouth once daily       Start Date: 1/22/2024 End Date: --    LORATADINE (CLARITIN) 10 MG TABLET    Take 10 mg by mouth daily as needed.       Start Date: --        End Date: --    ROSUVASTATIN (CRESTOR) 20 MG TABLET    Take 20 mg by mouth every evening.       Start Date: 3/7/2022  End Date: --    SEMAGLUTIDE (OZEMPIC) 1 MG/DOSE (4 MG/3 ML)    Inject 1 mg into the skin every 7 days.       Start Date: --        End Date: --   Discontinued Medications    DAPTOMYCIN (CUBICIN) 500 MG INJECTION    Inject into the vein.       Start Date: 5/9/2024  End Date: 6/20/2024    PIOGLITAZONE (ACTOS) 30 MG TABLET    Take 1 tablet (30 mg total) by mouth once daily.       Start Date: 9/21/2023 End Date: 6/20/2024    SEMAGLUTIDE (OZEMPIC) 0.25 MG OR 0.5 MG(2 MG/1.5 ML) PEN INJECTOR    Inject into the skin every 7 days.       Start Date: --        End Date: 6/20/2024    SODIUM CHLORIDE 0.9% (NORMAL SALINE FLUSH) INJECTION    Inject 10 mLs into the vein every 6 (six) hours.       Start Date: 4/12/2024 End Date: 6/20/2024    SODIUM CHLORIDE 0.9% (NORMAL SALINE FLUSH) INJECTION    Inject 10 mLs into the vein as needed.       Start Date: 4/12/2024 End Date: 6/20/2024        Follow up in about 3 months (around 9/20/2024) for follow up. In addition to their scheduled follow up, the patient has also been instructed to follow up on as needed basis.

## 2024-06-20 NOTE — ASSESSMENT & PLAN NOTE
No longer on triamterene/hydrochlorothiazide, unsure who is stopped it but blood pressure stable without it.

## 2024-06-20 NOTE — ASSESSMENT & PLAN NOTE
Hemoglobin A1c 4.8.  Blood sugar was low at 60 on recent labs.  Patient was notified on the 13th to stop the glipizide, he stopped the pioglitazone as well.  Will keep him on just Ozempic for now and return to clinic in 3 months with labs prior.  Encouraged to monitor for any signs and symptoms of low blood sugar.

## 2024-07-20 DIAGNOSIS — N40.0 BENIGN PROSTATIC HYPERPLASIA, UNSPECIFIED WHETHER LOWER URINARY TRACT SYMPTOMS PRESENT: ICD-10-CM

## 2024-07-22 RX ORDER — FINASTERIDE 5 MG/1
TABLET, FILM COATED ORAL
Qty: 90 TABLET | Refills: 0 | Status: SHIPPED | OUTPATIENT
Start: 2024-07-22

## 2024-08-20 ENCOUNTER — OFFICE VISIT (OUTPATIENT)
Dept: INFECTIOUS DISEASES | Facility: CLINIC | Age: 79
End: 2024-08-20
Payer: MEDICARE

## 2024-08-20 ENCOUNTER — HOSPITAL ENCOUNTER (OUTPATIENT)
Dept: RADIOLOGY | Facility: CLINIC | Age: 79
Discharge: HOME | End: 2024-08-20
Attending: ORTHOPAEDIC SURGERY
Payer: MEDICARE

## 2024-08-20 ENCOUNTER — OFFICE VISIT (OUTPATIENT)
Dept: ORTHOPEDICS | Facility: CLINIC | Age: 79
End: 2024-08-20
Payer: MEDICARE

## 2024-08-20 VITALS
RESPIRATION RATE: 20 BRPM | DIASTOLIC BLOOD PRESSURE: 72 MMHG | OXYGEN SATURATION: 95 % | BODY MASS INDEX: 42.66 KG/M2 | WEIGHT: 315 LBS | HEIGHT: 72 IN | SYSTOLIC BLOOD PRESSURE: 148 MMHG | HEART RATE: 101 BPM

## 2024-08-20 VITALS — HEIGHT: 72 IN | BODY MASS INDEX: 42.66 KG/M2 | WEIGHT: 315 LBS

## 2024-08-20 DIAGNOSIS — Z96.641 S/P HIP REPLACEMENT, RIGHT: ICD-10-CM

## 2024-08-20 DIAGNOSIS — E66.01 MORBID OBESITY: ICD-10-CM

## 2024-08-20 DIAGNOSIS — N18.31 TYPE 2 DIABETES MELLITUS WITH STAGE 3A CHRONIC KIDNEY DISEASE, WITHOUT LONG-TERM CURRENT USE OF INSULIN: ICD-10-CM

## 2024-08-20 DIAGNOSIS — L03.115 CELLULITIS OF RIGHT FOOT DUE TO METHICILLIN-RESISTANT STAPHYLOCOCCUS AUREUS: Primary | ICD-10-CM

## 2024-08-20 DIAGNOSIS — B95.62 CELLULITIS OF RIGHT FOOT DUE TO METHICILLIN-RESISTANT STAPHYLOCOCCUS AUREUS: Primary | ICD-10-CM

## 2024-08-20 DIAGNOSIS — Z96.649 PROSTHETIC HIP INFECTION, SUBSEQUENT ENCOUNTER: ICD-10-CM

## 2024-08-20 DIAGNOSIS — E11.22 TYPE 2 DIABETES MELLITUS WITH STAGE 3A CHRONIC KIDNEY DISEASE, WITHOUT LONG-TERM CURRENT USE OF INSULIN: ICD-10-CM

## 2024-08-20 DIAGNOSIS — Z96.641 S/P HIP REPLACEMENT, RIGHT: Primary | ICD-10-CM

## 2024-08-20 DIAGNOSIS — T84.59XD PROSTHETIC HIP INFECTION, SUBSEQUENT ENCOUNTER: ICD-10-CM

## 2024-08-20 PROCEDURE — 99213 OFFICE O/P EST LOW 20 MIN: CPT | Mod: ,,, | Performed by: NURSE PRACTITIONER

## 2024-08-20 PROCEDURE — 99214 OFFICE O/P EST MOD 30 MIN: CPT | Mod: S$PBB,,,

## 2024-08-20 PROCEDURE — 99213 OFFICE O/P EST LOW 20 MIN: CPT | Mod: PBBFAC

## 2024-08-20 PROCEDURE — 99999 PR PBB SHADOW E&M-EST. PATIENT-LVL III: CPT | Mod: PBBFAC,,,

## 2024-08-20 PROCEDURE — 73502 X-RAY EXAM HIP UNI 2-3 VIEWS: CPT | Mod: RT,,, | Performed by: ORTHOPAEDIC SURGERY

## 2024-08-20 NOTE — PROGRESS NOTES
Chief Complaint:   Chief Complaint   Patient presents with    Post-op Evaluation     Post op right hip revision 4/8/24. Bilat knee cortisone injections on 6/11/24. Xr today. In wheelchair today. Has cane with him.       History of present illness: Chris Mendoza II is a 79 y.o. male, presents to clinic today follow up of revision right total hip for infection.  He does continue to have some discomfort in the hip and is in a wheelchair today here in clinic.  Although he does ambulate some with a cane.  States the worst pain he is having in his bilateral knees.  Upon his last visit he did get bilateral cortisone injections.  These have helped him somewhat.  Does continue to have pain and discomfort in the knees.  Also states in the knees do buckle on him.  Currently is continuing his p.o. doxycycline and does have him follow up with infectious disease today after he has been here.      Past Medical History:   Diagnosis Date    Arthritis     Atrial fibrillation     Bladder cancer     CKD (chronic kidney disease)     Diabetes mellitus, type 2     Gout, unspecified     Hyperlipidemia     Hypertension     Restless leg syndrome     Seasonal allergies     Sleep apnea, unspecified     Thyroid disease        Past Surgical History:   Procedure Laterality Date    ARTHROGRAM Right 02/15/2024    Procedure: ARTHROGRAM  aspiration right hip with anesthesia #1;  Surgeon: Jose Luna MD;  Location: [facility] OR;  Service: Orthopedics;  Laterality: Right;  aspiration right hip with anesthesia    BLADDER SURGERY  2009    removed Ca    CARDIAC CATHETERIZATION      CARDIOVERSION      CATARACT EXTRACTION W/  INTRAOCULAR LENS IMPLANT Bilateral     JOINT REPLACEMENT Right     Total Hip    MULTIPLE TOOTH EXTRACTIONS      REVISION TOTAL HIP ARTHROPLASTY Right 4/8/2024    Procedure: REVISION, TOTAL ARTHROPLASTY, HIP - revision R COLLIN with abx spacer;  Surgeon: Jose Luna MD;  Location: [facility] OR;  Service: Orthopedics;  Laterality: Right;   revision R COLLIN with abx spacer    VEIN SURGERY         Current Outpatient Medications   Medication Sig    allopurinoL (ZYLOPRIM) 300 MG tablet Take 1 tablet by mouth twice daily    doxazosin (CARDURA) 4 MG tablet Take 1 tablet by mouth once daily    doxycycline (VIBRA-TABS) 100 MG tablet Take 1 tablet (100 mg total) by mouth once daily.    ELIQUIS 5 mg Tab Take 5 mg by mouth 2 (two) times daily.    finasteride (PROSCAR) 5 mg tablet Take 1 tablet by mouth once daily    loratadine (CLARITIN) 10 mg tablet Take 10 mg by mouth daily as needed.    rosuvastatin (CRESTOR) 20 MG tablet Take 20 mg by mouth every evening.    semaglutide (OZEMPIC) 1 mg/dose (4 mg/3 mL) Inject 1 mg into the skin every 7 days.     No current facility-administered medications for this visit.       Review of patient's allergies indicates:   Allergen Reactions    Clindamycin Rash    Levofloxacin Hives       Family History   Problem Relation Name Age of Onset    Coronary artery disease Mother      Cancer Father Chris noer sr.     Heart disease Father Chris noer sr.     Thyroid disease Sister         Social History     Socioeconomic History    Marital status:    Tobacco Use    Smoking status: Never    Smokeless tobacco: Never   Substance and Sexual Activity    Alcohol use: Not Currently    Drug use: Not Currently     Types: Marijuana    Sexual activity: Yes     Partners: Female           Review of Systems:    Denies fevers, chills, chest pain, shortness of breath. Comprehensive review of systems performed and otherwise negative except as noted in HPI      Physical Examination:    General: awake and alert, no acute distress, healthy appearing  Head and Neck: Head atraumatic/normocephalic. Moist MM  CV: brisk cap refill  Lungs: non-labored breathing, w/o cough or SOB  Skin: no rashes present, warm to touch  Neuro: sensation grossly intact distall     Vital Signs:    There were no vitals filed for this visit.    Body mass index is 45.45  kg/m².    Right hip exam:    Right hip incision clean dry and intact. No erythema, drainage, or signs of infection  No swelling distally. No signs of DVT  Brisk cap refill right foot  Sensation intact distally to right foot  Positive FHL/EHL/gastrocsoleus/tib ant    Bilateral knees:  Crepitus range of motion of both knees.  Brisk cap refill disappeared sensation intact distally.    X-rays: 3 views of the right hip reviewed. Patient's implants appear well fixed. No signs of loosening or subsidence noted.     Assessment::post op status post revision of right total hip with antibiotic spacer    Plan:  Patient presents in regards to the right hip.  His hip is stable on exam x-rays today here in clinic.  Encouraged to continue ambulating it was mentioned she can.  Regards to his knees too early to give any further injections as these have only been about 8 weeks.  We will get him back in a month when we can give him bilateral cortisone injections.  He is to continue with ID recommendations for antibiotic treatment.  Patient states understanding and agrees with plan of care.    This note was created using [employer] Dash voice recognition software that occasionally misinterpreted phrases or words.    Consult note is delivered via Epic messaging service.

## 2024-08-20 NOTE — PROGRESS NOTES
[facility]    HISTORY OF PRESENT ILLNESS:     Chris Mendoza II is a 79 y.o. male here today for f/u    Initial hospital encounter  78-year-old male patient known to have past medical history significant for DM 2, obesity, gout, prior COLLIN, complicated by MRSA infection s/p revision 05/11/2020, completed 6 weeks of IV vancomycin which was complicated by DAGOBERTO, transitioned to daptomycin, completed 6 weeks of antibiotics and patient did fairly well, since then he has been doing relatively okay.  However in 02/2024 he started having worsening pain in the right hip, followed up with his orthopedic surgeon, had aspiration done 02/05/2024, this grew MRSA, patient was given course of doxycycline however no significant improvement, presented to the hospital for revision of COLLIN.  He underwent revision on 04/08/2024 with revision of the right total hip arthroplasty both components, placement of antibiotic delivery device, complete synovectomy of the right hip.  Intraoperative cultures growing MRSA again.  ID consulted for assistance in management.  Antibiotics:  Cefazolin 04/08 - 04/09  Vancomycin 04/08 - 04/12/2024   Septic prosthetic right hip   MRSA infection   Prior MRSA infection of same hip  Dm 2  Obesity      Plan:  -discontinue vancomycin given patient's previous history of DAGOBERTO while on this medicine   -start daptomycin 8 milligrams/kilograms IV Q 24 hours  -will need 6 weeks day of surgery 04/08/2024   -anticipated end date 05/20/2024   -after completion of IV antibiotics, would recommend a p.o. tail for at least 2-3 months depending on surgical plans and patient's clinical improvement  -add on CPK to current labs   -Please monitor weekly CBC, CMP, ESR, CRP and CPK Fax results to my office at [phone]  -will set up follow-up with us in the office in 2-3 weeks  -discussed with patient, wife, nursing, primary team attending    5/9/2024  Here for f/u accompanied by wife.  No complaints.  Doing well in  therapy.   Remains on daptomycin, recent labs good.    6/12/2024  Here for f/u.  Cintia out, R hip surgery site healed.  Compliant with doxycycline suppression, no issues.  No complaints today.      08/20/2024 office visit:   Mr. Mendoza presents for follow-up today. Continues on doxycyline. Denies any fever, chills, or night sweats. Denies any nausea, vomiting, or diarrhea.       Past Medical History:   Diagnosis Date    Arthritis     Atrial fibrillation     Bladder cancer     CKD (chronic kidney disease)     Diabetes mellitus, type 2     Gout, unspecified     Hyperlipidemia     Hypertension     Restless leg syndrome     Seasonal allergies     Sleep apnea, unspecified     Thyroid disease         Past Surgical History:   Procedure Laterality Date    ARTHROGRAM Right 02/15/2024    Procedure: ARTHROGRAM  aspiration right hip with anesthesia #1;  Surgeon: Jose Luna MD;  Location: [facility];  Service: Orthopedics;  Laterality: Right;  aspiration right hip with anesthesia    BLADDER SURGERY  2009    removed Ca    CARDIAC CATHETERIZATION      CARDIOVERSION      CATARACT EXTRACTION W/  INTRAOCULAR LENS IMPLANT Bilateral     JOINT REPLACEMENT Right     Total Hip    MULTIPLE TOOTH EXTRACTIONS      REVISION TOTAL HIP ARTHROPLASTY Right 4/8/2024    Procedure: REVISION, TOTAL ARTHROPLASTY, HIP - revision R COLLIN with abx spacer;  Surgeon: Jose Luna MD;  Location: [facility];  Service: Orthopedics;  Laterality: Right;  revision R COLLIN with abx spacer    VEIN SURGERY          Social History     Tobacco Use    Smoking status: Never    Smokeless tobacco: Never   Substance and Sexual Activity    Alcohol use: Not Currently    Drug use: Not Currently     Types: Marijuana    Sexual activity: Yes     Partners: Female        Review of patient's allergies indicates:   Allergen Reactions    Clindamycin Rash    Levofloxacin Hives        Patient Care Team:  Shobha Mcwilliams FNP as PCP - General (Nurse Practitioner)  Kenyon Lara  MD FABY as Consulting Physician (Ophthalmology)  Diogo Malloy MD as Consulting Physician (Cardiovascular Disease)  Anthony Miller OD (Optometry)  Jose Luna MD as Consulting Physician (Orthopedic Surgery)     MEDICATIONS:     Current Outpatient Medications   Medication Instructions    allopurinoL (ZYLOPRIM) 300 MG tablet Take 1 tablet by mouth twice daily    doxazosin (CARDURA) 4 MG tablet Take 1 tablet by mouth once daily    doxycycline (VIBRA-TABS) 100 mg, Oral, Daily    ELIQUIS 5 mg, Oral, 2 times daily    finasteride (PROSCAR) 5 mg tablet Take 1 tablet by mouth once daily    loratadine (CLARITIN) 10 mg, Oral, Daily PRN    OZEMPIC 1 mg, Subcutaneous, Every 7 days    rosuvastatin (CRESTOR) 20 mg, Oral, Nightly       REVIEW OF SYSTEMS:   Except as documented in the HPI, all other systems reviewed and negative. See HPI for details.     PHYSICAL EXAM:     Visit Vitals  BP (!) 148/72   Pulse 101   Resp 20   Ht 6' (1.829 m)   Wt (!) 152 kg (335 lb 1.6 oz)   SpO2 95%   BMI 45.45 kg/m²         GENERAL: NAD; does not appear toxic  SKIN: no rash  HEENT: sclera non-icteric; PERRL   NECK: supple; no LAD  CHEST: CTA; nonlabored, equal expansion; no adventitious BS  CARDIOVASCULAR: RRR, S1S2; no murmur; strong, equal peripheral pulses; no edema  ABDOMEN:  active bowel sounds; abdomen soft, nondistended, nontender to palpation  EXTREMITIES: no cyanosis or clubbing; R hip incision healed  NEURO: AAO x4; CN II-XII grossly intact  PSYCH: Mentation and affect appropriate     LABS AND IMAGING:     Independently reviewed all laboratory and diagnostic studies relevant to infectious disease.    ASSESSMENT:     Problem List Items Addressed This Visit          ID    Cellulitis of right foot due to methicillin-resistant Staphylococcus aureus - Primary       Endocrine    Morbid obesity    Type 2 diabetes mellitus with chronic kidney disease, without long-term current use of insulin       Other    Prosthetic hip infection,  subsequent encounter            PLAN:   Completed daptomycin on 5/20 and started on doxycycline 100mg PO BID.   -he completed a three-month tail doxycycline 100 mg p.o. q.12 hours.  Would like to discontinue doxycycline now and monitor off of antimicrobials.  -monitor for systemic signs of infection such as fever, chills, night sweats or worsening hip pain.  If these problems persist, may need further evaluation with a repeat imaging and labs.  -we will follow up again in 4 weeks to see progression.      CHARO Whipple  Infectious Diseases

## 2024-09-17 ENCOUNTER — TELEPHONE (OUTPATIENT)
Dept: FAMILY MEDICINE | Facility: CLINIC | Age: 79
End: 2024-09-17
Payer: MEDICARE

## 2024-09-17 NOTE — TELEPHONE ENCOUNTER
Are there any outstanding tasks in patient's chart?  labs    2. Do we have outstanding/pending referrals?    n  3. Has the patient been seen in an ER, Urgent Care, or admitted since last visit?    n  4. Has patient seen any other health care providers since last visit?  Dr. Luna    5.  Has patient had any blood work or x-rays done since last visit?   Patient will complete labs prior to visit

## 2024-09-18 ENCOUNTER — LAB VISIT (OUTPATIENT)
Dept: LAB | Facility: HOSPITAL | Age: 79
End: 2024-09-18
Attending: NURSE PRACTITIONER
Payer: MEDICARE

## 2024-09-18 DIAGNOSIS — E11.22 TYPE 2 DIABETES MELLITUS WITH STAGE 3A CHRONIC KIDNEY DISEASE, WITHOUT LONG-TERM CURRENT USE OF INSULIN: ICD-10-CM

## 2024-09-18 DIAGNOSIS — N18.31 STAGE 3A CHRONIC KIDNEY DISEASE: ICD-10-CM

## 2024-09-18 DIAGNOSIS — N18.31 TYPE 2 DIABETES MELLITUS WITH STAGE 3A CHRONIC KIDNEY DISEASE, WITHOUT LONG-TERM CURRENT USE OF INSULIN: ICD-10-CM

## 2024-09-18 DIAGNOSIS — Z12.5 PROSTATE CANCER SCREENING: ICD-10-CM

## 2024-09-18 LAB
ALBUMIN SERPL-MCNC: 3.9 G/DL (ref 3.4–4.8)
ALBUMIN/GLOB SERPL: 1.1 RATIO (ref 1.1–2)
ALP SERPL-CCNC: 86 UNIT/L (ref 40–150)
ALT SERPL-CCNC: 14 UNIT/L (ref 0–55)
ANION GAP SERPL CALC-SCNC: 9 MEQ/L
AST SERPL-CCNC: 17 UNIT/L (ref 5–34)
BILIRUB SERPL-MCNC: 1 MG/DL
BUN SERPL-MCNC: 12.3 MG/DL (ref 8.4–25.7)
CALCIUM SERPL-MCNC: 10 MG/DL (ref 8.8–10)
CHLORIDE SERPL-SCNC: 111 MMOL/L (ref 98–107)
CO2 SERPL-SCNC: 23 MMOL/L (ref 23–31)
CREAT SERPL-MCNC: 1.19 MG/DL (ref 0.73–1.18)
CREAT/UREA NIT SERPL: 10
EST. AVERAGE GLUCOSE BLD GHB EST-MCNC: 116.9 MG/DL
GFR SERPLBLD CREATININE-BSD FMLA CKD-EPI: >60 ML/MIN/1.73/M2
GLOBULIN SER-MCNC: 3.5 GM/DL (ref 2.4–3.5)
GLUCOSE SERPL-MCNC: 106 MG/DL (ref 82–115)
HBA1C MFR BLD: 5.7 %
POTASSIUM SERPL-SCNC: 4.1 MMOL/L (ref 3.5–5.1)
PROT SERPL-MCNC: 7.4 GM/DL (ref 5.8–7.6)
PSA SERPL-MCNC: 0.1 NG/ML
SODIUM SERPL-SCNC: 143 MMOL/L (ref 136–145)

## 2024-09-18 PROCEDURE — 84153 ASSAY OF PSA TOTAL: CPT

## 2024-09-18 PROCEDURE — 80053 COMPREHEN METABOLIC PANEL: CPT

## 2024-09-18 PROCEDURE — 83036 HEMOGLOBIN GLYCOSYLATED A1C: CPT

## 2024-09-18 PROCEDURE — 36415 COLL VENOUS BLD VENIPUNCTURE: CPT

## 2024-09-23 PROBLEM — Z00.00 MEDICARE ANNUAL WELLNESS VISIT, SUBSEQUENT: Status: RESOLVED | Noted: 2023-03-16 | Resolved: 2024-09-23

## 2024-09-24 ENCOUNTER — CLINICAL SUPPORT (OUTPATIENT)
Dept: FAMILY MEDICINE | Facility: CLINIC | Age: 79
End: 2024-09-24
Attending: NURSE PRACTITIONER
Payer: MEDICARE

## 2024-09-24 ENCOUNTER — OFFICE VISIT (OUTPATIENT)
Dept: FAMILY MEDICINE | Facility: CLINIC | Age: 79
End: 2024-09-24
Payer: MEDICARE

## 2024-09-24 VITALS
TEMPERATURE: 98 F | DIASTOLIC BLOOD PRESSURE: 78 MMHG | WEIGHT: 315 LBS | HEART RATE: 81 BPM | HEIGHT: 72 IN | RESPIRATION RATE: 16 BRPM | SYSTOLIC BLOOD PRESSURE: 136 MMHG | BODY MASS INDEX: 42.66 KG/M2 | OXYGEN SATURATION: 96 %

## 2024-09-24 DIAGNOSIS — I48.20 CHRONIC ATRIAL FIBRILLATION: Primary | ICD-10-CM

## 2024-09-24 DIAGNOSIS — E11.22 TYPE 2 DIABETES MELLITUS WITH STAGE 3A CHRONIC KIDNEY DISEASE, WITHOUT LONG-TERM CURRENT USE OF INSULIN: ICD-10-CM

## 2024-09-24 DIAGNOSIS — N18.31 TYPE 2 DIABETES MELLITUS WITH STAGE 3A CHRONIC KIDNEY DISEASE, WITHOUT LONG-TERM CURRENT USE OF INSULIN: ICD-10-CM

## 2024-09-24 DIAGNOSIS — I10 PRIMARY HYPERTENSION: ICD-10-CM

## 2024-09-24 DIAGNOSIS — K02.9 DENTAL DECAY: ICD-10-CM

## 2024-09-24 DIAGNOSIS — Z23 NEED FOR INFLUENZA VACCINATION: ICD-10-CM

## 2024-09-24 DIAGNOSIS — E78.2 MIXED HYPERLIPIDEMIA: ICD-10-CM

## 2024-09-24 DIAGNOSIS — N18.31 STAGE 3A CHRONIC KIDNEY DISEASE: ICD-10-CM

## 2024-09-24 DIAGNOSIS — R42 VERTIGO: ICD-10-CM

## 2024-09-24 DIAGNOSIS — J32.9 SINUSITIS, UNSPECIFIED CHRONICITY, UNSPECIFIED LOCATION: ICD-10-CM

## 2024-09-24 DIAGNOSIS — Z12.5 PROSTATE CANCER SCREENING: ICD-10-CM

## 2024-09-24 DIAGNOSIS — Z03.89 NO DIAGNOSIS OR CONDITION ON AXIS I: Primary | ICD-10-CM

## 2024-09-24 PROCEDURE — 99214 OFFICE O/P EST MOD 30 MIN: CPT | Mod: ,,, | Performed by: NURSE PRACTITIONER

## 2024-09-24 RX ORDER — AMOXICILLIN AND CLAVULANATE POTASSIUM 875; 125 MG/1; MG/1
1 TABLET, FILM COATED ORAL 2 TIMES DAILY
Qty: 20 TABLET | Refills: 0 | Status: SHIPPED | OUTPATIENT
Start: 2024-09-24

## 2024-09-24 RX ORDER — MECLIZINE HYDROCHLORIDE 25 MG/1
25 TABLET ORAL 3 TIMES DAILY PRN
Qty: 90 TABLET | Refills: 1 | Status: SHIPPED | OUTPATIENT
Start: 2024-09-24

## 2024-09-24 NOTE — PROGRESS NOTES
Chris Mendoza II is a 79 y.o. male here for a diabetic eye screening with non-dilated fundus photos per CHARO HARRIS.    Patient cooperative?: Yes  Small pupils?: Yes  Last eye exam: 9/21/23    For exam results, see Encounter Report.

## 2024-09-24 NOTE — ASSESSMENT & PLAN NOTE
Stable, BUN 12.3, creatinine 1.19, EGFR greater than 60.  Recommend continued renal protective measures, follow-up 3 months.

## 2024-09-24 NOTE — ASSESSMENT & PLAN NOTE
Improved, no more hypoglycemia since stopping the glipizide and pioglitazone.  Hemoglobin A1c stable at 5.7.  Continue Ozempic 1 mg weekly.  Follow-up 3 months.  Patient will collect urine for microalbumin at home, wife will drop off tomorrow.  In office retina screening photo deferred today due to vertigo.  Patient will return to clinic once vertigo subsides for retina screening photo.

## 2024-09-24 NOTE — ASSESSMENT & PLAN NOTE
Start Augmentin 875 twice daily for 10 days.  Recommend following up with dentist.  Also, referring to ENT for evaluation of TMJ/jaw pain

## 2024-09-24 NOTE — PROGRESS NOTES
Subjective:       Patient ID: Chris Mendoza II is a 79 y.o. male.    Chief Complaint: Hypertension (3m fu), Chronic Kidney Disease (3m fu), Diabetes (3m fu), and Hyperlipidemia (3m fu)      HPI   This has a 79-year-old white male who presents to clinic today for three-month follow-up for AFib, hyperlipidemia, hypertension, CKD, diabetes.  Reports that about 3 weeks ago, started with significant dizziness.  Worse when he turns his head, sits up from a lying position, or stands up.  Feels like the room is spinning.  Gets so dizzy that he feels nauseated at times.  About one-week after the dizziness started, he started having pain in his left ear, left jaw, left sinus.  Also left-sided nasal congestion.  When he takes a deep breath in through his nose, he gets a shocking pain from his nose to his ear on the left side.  He denies any fever.  Review of Systems  Comprehensive review of systems negative except as stated in HPI    The patient's Health Maintenance was reviewed and the following appears to be due:   Health Maintenance Due   Topic Date Due    Influenza Vaccine (1) 09/01/2024    Diabetes Urine Screening  09/12/2024    Eye Exam  09/21/2024       Past Medical History:  Past Medical History:   Diagnosis Date    Arthritis     Atrial fibrillation     Bladder cancer     CKD (chronic kidney disease)     Diabetes mellitus, type 2     Gout, unspecified     Hyperlipidemia     Hypertension     Restless leg syndrome     Seasonal allergies     Sleep apnea, unspecified     Thyroid disease      Past Surgical History:   Procedure Laterality Date    ARTHROGRAM Right 02/15/2024    Procedure: ARTHROGRAM  aspiration right hip with anesthesia #1;  Surgeon: Jose Luna MD;  Location: [facility];  Service: Orthopedics;  Laterality: Right;  aspiration right hip with anesthesia    BLADDER SURGERY  2009    removed Ca    CARDIAC CATHETERIZATION      CARDIOVERSION      CATARACT EXTRACTION W/  INTRAOCULAR LENS IMPLANT Bilateral      JOINT REPLACEMENT Right     Total Hip    MULTIPLE TOOTH EXTRACTIONS      REVISION TOTAL HIP ARTHROPLASTY Right 4/8/2024    Procedure: REVISION, TOTAL ARTHROPLASTY, HIP - revision R COLLIN with abx spacer;  Surgeon: Jose Luna MD;  Location: [facility];  Service: Orthopedics;  Laterality: Right;  revision R COLLIN with abx spacer    VEIN SURGERY       Review of patient's allergies indicates:   Allergen Reactions    Clindamycin Rash    Levofloxacin Hives     Current Outpatient Medications on File Prior to Visit   Medication Sig Dispense Refill    allopurinoL (ZYLOPRIM) 300 MG tablet Take 1 tablet by mouth twice daily 180 tablet 0    doxazosin (CARDURA) 4 MG tablet Take 1 tablet by mouth once daily 90 tablet 2    ELIQUIS 5 mg Tab Take 5 mg by mouth 2 (two) times daily.      finasteride (PROSCAR) 5 mg tablet Take 1 tablet by mouth once daily 90 tablet 0    loratadine (CLARITIN) 10 mg tablet Take 10 mg by mouth daily as needed.      rosuvastatin (CRESTOR) 20 MG tablet Take 20 mg by mouth every evening.      semaglutide (OZEMPIC) 1 mg/dose (4 mg/3 mL) Inject 1 mg into the skin every 7 days.      [DISCONTINUED] doxycycline (VIBRA-TABS) 100 MG tablet Take 1 tablet (100 mg total) by mouth once daily. (Patient not taking: Reported on 9/24/2024) 60 tablet 5     No current facility-administered medications on file prior to visit.     Social History     Socioeconomic History    Marital status:    Tobacco Use    Smoking status: Never    Smokeless tobacco: Never   Substance and Sexual Activity    Alcohol use: Not Currently    Drug use: Not Currently     Types: Marijuana    Sexual activity: Yes     Partners: Female     Family History   Problem Relation Name Age of Onset    Coronary artery disease Mother      Cancer Father Chris cee sr.     Heart disease Father Chris cee sr.     Thyroid disease Sister         Objective:       /78 (BP Location: Left arm)   Pulse 81   Temp 97.8 °F (36.6 °C) (Oral)   Resp 16   Ht 6'  (1.829 m)   Wt (!) 150.6 kg (332 lb)   SpO2 96%   BMI 45.03 kg/m²      Physical Exam  Vitals and nursing note reviewed.   Constitutional:       Appearance: Normal appearance. He is obese.   HENT:      Head: Normocephalic and atraumatic.      Jaw: Tenderness (Left TMJ) present.      Right Ear: Tympanic membrane, ear canal and external ear normal.      Left Ear: Tympanic membrane, ear canal and external ear normal.      Nose: Congestion present.      Mouth/Throat:      Mouth: Mucous membranes are moist.      Pharynx: Oropharynx is clear.      Comments: Diffuse chronic dental decay  Eyes:      Extraocular Movements: Extraocular movements intact.      Conjunctiva/sclera: Conjunctivae normal.      Pupils: Pupils are equal, round, and reactive to light.   Cardiovascular:      Rate and Rhythm: Normal rate. Rhythm irregularly irregular.      Pulses: Normal pulses.      Heart sounds: Normal heart sounds.   Pulmonary:      Effort: Pulmonary effort is normal.      Breath sounds: Normal breath sounds.   Musculoskeletal:         General: Normal range of motion.      Cervical back: Normal range of motion and neck supple.      Comments: Steady gait with the assistance of cane   Skin:     General: Skin is warm and dry.   Neurological:      General: No focal deficit present.      Mental Status: He is alert and oriented to person, place, and time.   Psychiatric:         Mood and Affect: Mood normal.         Behavior: Behavior normal.         Thought Content: Thought content normal.         Judgment: Judgment normal.         Labs  Lab Visit on 09/18/2024   Component Date Value Ref Range Status    Sodium 09/18/2024 143  136 - 145 mmol/L Final    Potassium 09/18/2024 4.1  3.5 - 5.1 mmol/L Final    Chloride 09/18/2024 111 (H)  98 - 107 mmol/L Final    CO2 09/18/2024 23  23 - 31 mmol/L Final    Glucose 09/18/2024 106  82 - 115 mg/dL Final    Blood Urea Nitrogen 09/18/2024 12.3  8.4 - 25.7 mg/dL Final    Creatinine 09/18/2024 1.19 (H)   0.73 - 1.18 mg/dL Final    Calcium 09/18/2024 10.0  8.8 - 10.0 mg/dL Final    Protein Total 09/18/2024 7.4  5.8 - 7.6 gm/dL Final    Albumin 09/18/2024 3.9  3.4 - 4.8 g/dL Final    Globulin 09/18/2024 3.5  2.4 - 3.5 gm/dL Final    Albumin/Globulin Ratio 09/18/2024 1.1  1.1 - 2.0 ratio Final    Bilirubin Total 09/18/2024 1.0  <=1.5 mg/dL Final    ALP 09/18/2024 86  40 - 150 unit/L Final    ALT 09/18/2024 14  0 - 55 unit/L Final    AST 09/18/2024 17  5 - 34 unit/L Final    eGFR 09/18/2024 >60  mL/min/1.73/m2 Final    Anion Gap 09/18/2024 9.0  mEq/L Final    BUN/Creatinine Ratio 09/18/2024 10   Final    Hemoglobin A1c 09/18/2024 5.7  <=7.0 % Final    Estimated Average Glucose 09/18/2024 116.9  mg/dL Final    Prostate Specific Antigen 09/18/2024 0.10  <=4.00 ng/mL Final         Assessment and Plan       ICD-10-CM ICD-9-CM   1. Chronic atrial fibrillation  I48.20 427.31   2. Mixed hyperlipidemia  E78.2 272.2   3. Primary hypertension  I10 401.9   4. Stage 3a chronic kidney disease  N18.31 585.3   5. Prostate cancer screening  Z12.5 V76.44   6. Type 2 diabetes mellitus with stage 3a chronic kidney disease, without long-term current use of insulin  E11.22 250.40    N18.31 585.3   7. Need for influenza vaccination  Z23 V04.81   8. Sinusitis, unspecified chronicity, unspecified location  J32.9 473.9   9. Vertigo  R42 780.4   10. Dental decay  K02.9 521.00        1. Chronic atrial fibrillation  Overview:  Dr Gama Chaney 5 mg twice daily    Assessment & Plan:  Stable, continue management per Dr. Malloy, follow-up 6 months      2. Mixed hyperlipidemia  Overview:  Rosuvastatin 20 mg daily    Assessment & Plan:  Lipid panel in 3 months    Orders:  -     Comprehensive Metabolic Panel; Future; Expected date: 12/24/2024  -     Lipid Panel; Future; Expected date: 12/24/2024    3. Primary hypertension  Overview:  Previously Triamterene HCTZ 37.5/25 mg 2 capsules daily    Assessment & Plan:  Stable without medication, continue  to monitor blood pressure.  Follow-up here in 3 months    Orders:  -     Comprehensive Metabolic Panel; Future; Expected date: 12/24/2024    4. Stage 3a chronic kidney disease  Assessment & Plan:  Stable, BUN 12.3, creatinine 1.19, EGFR greater than 60.  Recommend continued renal protective measures, follow-up 3 months.      Orders:  -     CBC Auto Differential; Future; Expected date: 12/24/2024  -     Comprehensive Metabolic Panel; Future; Expected date: 12/24/2024    5. Prostate cancer screening  Overview:  PSA yearly in September    Assessment & Plan:  PSA 0.10, repeat 1 year.      6. Type 2 diabetes mellitus with stage 3a chronic kidney disease, without long-term current use of insulin  Overview:  Previously on Glipizide XL 2.5 mg daily    03/08/2022 - Hgb A1c 7.6, increase Glipizide XL to 5 mg daily  09/14/2022 - Hgb A1c 7.9, add Tradjenta 5 mg daily  12/13/2022 - Hgb A1c 8.6, never started Tradjenta due to cost. Increase Glipizide XL to 10 mg daily. Refer to Dr Amos   03/16/2023 - Hgb A1c 6.6, never went to endocrinology, did not increase Glipizide to 10 mg daily, new RX sent today for Glipizide XL 10 mg daily  09/21/2023 - Hgb A1c 7.9, add pioglitazone 30 mg daily  11/2023 - Ozempic patient assistance program approved, start Ozempic  06/13/2024 - Hgb A1c 4.8, DC glipizide and pioglitazone      Assessment & Plan:  Improved, no more hypoglycemia since stopping the glipizide and pioglitazone.  Hemoglobin A1c stable at 5.7.  Continue Ozempic 1 mg weekly.  Follow-up 3 months.  Patient will collect urine for microalbumin at home, wife will drop off tomorrow.  In office retina screening photo deferred today due to vertigo.  Patient will return to clinic once vertigo subsides for retina screening photo.    Orders:  -     Diabetic Eye Screening Photo  -     Comprehensive Metabolic Panel; Future; Expected date: 12/24/2024  -     Hemoglobin A1C; Future; Expected date: 12/24/2024    7. Need for influenza  vaccination  Comments:  Deferred today due to vertigo/sinus issues, will return to clinic next month for flu shot    8. Sinusitis, unspecified chronicity, unspecified location  Comments:  Augmentin x 10 days. Refer to Dr Metz  Orders:  -     amoxicillin-clavulanate 875-125mg (AUGMENTIN) 875-125 mg per tablet; Take 1 tablet by mouth 2 (two) times daily.  Dispense: 20 tablet; Refill: 0    9. Vertigo  Assessment & Plan:  Start meclizine 25 mg 3 times daily as needed for vertigo.  Refer to Dr. Metz for further evaluation.    Orders:  -     meclizine (ANTIVERT) 25 mg tablet; Take 1 tablet (25 mg total) by mouth 3 (three) times daily as needed for Dizziness.  Dispense: 90 tablet; Refill: 1  -     Ambulatory referral/consult to ENT; Future; Expected date: 10/01/2024    10. Dental decay  Assessment & Plan:  Start Augmentin 875 twice daily for 10 days.  Recommend following up with dentist.  Also, referring to ENT for evaluation of TMJ/jaw pain             Follow up in about 15 weeks (around 1/7/2025) for follow up.

## 2024-09-24 NOTE — ASSESSMENT & PLAN NOTE
Start meclizine 25 mg 3 times daily as needed for vertigo.  Refer to Dr. Metz for further evaluation.

## 2024-09-25 PROCEDURE — 82570 ASSAY OF URINE CREATININE: CPT | Performed by: NURSE PRACTITIONER

## 2024-09-25 PROCEDURE — 82043 UR ALBUMIN QUANTITATIVE: CPT | Performed by: NURSE PRACTITIONER

## 2024-09-26 LAB
CREAT UR-MCNC: 256.4 MG/DL (ref 63–166)
MICROALBUMIN UR-MCNC: 7.6 UG/ML
MICROALBUMIN/CREAT RATIO PNL UR: 3 MG/GM CR (ref 0–30)

## 2024-10-01 ENCOUNTER — OFFICE VISIT (OUTPATIENT)
Dept: ORTHOPEDICS | Facility: CLINIC | Age: 79
End: 2024-10-01
Payer: MEDICARE

## 2024-10-01 ENCOUNTER — HOSPITAL ENCOUNTER (OUTPATIENT)
Dept: RADIOLOGY | Facility: CLINIC | Age: 79
Discharge: HOME | End: 2024-10-01
Attending: ORTHOPAEDIC SURGERY
Payer: MEDICARE

## 2024-10-01 VITALS — DIASTOLIC BLOOD PRESSURE: 78 MMHG | HEART RATE: 88 BPM | SYSTOLIC BLOOD PRESSURE: 142 MMHG

## 2024-10-01 DIAGNOSIS — M17.12 PRIMARY OSTEOARTHRITIS OF LEFT KNEE: ICD-10-CM

## 2024-10-01 DIAGNOSIS — M17.12 PRIMARY OSTEOARTHRITIS OF LEFT KNEE: Primary | ICD-10-CM

## 2024-10-01 DIAGNOSIS — M17.11 PRIMARY OSTEOARTHRITIS OF RIGHT KNEE: ICD-10-CM

## 2024-10-01 PROCEDURE — 20610 DRAIN/INJ JOINT/BURSA W/O US: CPT | Mod: 50,,, | Performed by: NURSE PRACTITIONER

## 2024-10-01 PROCEDURE — 99214 OFFICE O/P EST MOD 30 MIN: CPT | Mod: 25,,, | Performed by: ORTHOPAEDIC SURGERY

## 2024-10-01 PROCEDURE — 73562 X-RAY EXAM OF KNEE 3: CPT | Mod: 50,,, | Performed by: ORTHOPAEDIC SURGERY

## 2024-10-01 NOTE — PROCEDURES
Large Joint Aspiration/Injection: bilateral knee    Date/Time: 10/1/2024 3:45 PM    Performed by: Little Contreras FNP  Authorized by: Little Contreras FNP    Consent Done?:  Yes (Verbal)  Indications:  Pain and arthritis  Site marked: the procedure site was marked    Timeout: prior to procedure the correct patient, procedure, and site was verified    Prep: patient was prepped and draped in usual sterile fashion    Local anesthesia used?: No      Details:  Needle Size:  22 G  Ultrasonic Guidance for needle placement?: No    Approach:  Anterolateral  Location:  Knee  Laterality:  Bilateral  Site:  Bilateral knee  Medications (Right):  48 mg hylan g-f 20 48 mg/6 mL  Medications (Left):  48 mg hylan g-f 20 48 mg/6 mL  Patient tolerance:  Patient tolerated the procedure well with no immediate complications

## 2024-10-01 NOTE — PROGRESS NOTES
Chief Complaint:   Chief Complaint   Patient presents with    Right Hip - Injections     Pt stated he had injections done 06/11/2024. Pt also stated injections didn't help stefanie lasted for 4 days.    Left Hip - Injections       History of present illness:    History of Present Illness  The patient presents for evaluation of bilateral knee pain. He is accompanied by an adult female.    He reports experiencing intermittent hip pain, along with persistent issues in both knees. He describes a sensation as if his knee might invert. He received injections in June 2024, which provided relief for approximately one week. He has undergone three different types of injections, none of which have provided lasting relief beyond a week. He also mentions suffering from vertigo. His left knee appears to be more severely affected than the right.    Past Medical History:   Diagnosis Date    Arthritis     Atrial fibrillation     Bladder cancer     CKD (chronic kidney disease)     Diabetes mellitus, type 2     Gout, unspecified     Hyperlipidemia     Hypertension     Restless leg syndrome     Seasonal allergies     Sleep apnea, unspecified     Thyroid disease        Past Surgical History:   Procedure Laterality Date    ARTHROGRAM Right 02/15/2024    Procedure: ARTHROGRAM  aspiration right hip with anesthesia #1;  Surgeon: Jose Luna MD;  Location: [facility];  Service: Orthopedics;  Laterality: Right;  aspiration right hip with anesthesia    BLADDER SURGERY  2009    removed Ca    CARDIAC CATHETERIZATION      CARDIOVERSION      CATARACT EXTRACTION W/  INTRAOCULAR LENS IMPLANT Bilateral     JOINT REPLACEMENT Right     Total Hip    MULTIPLE TOOTH EXTRACTIONS      REVISION TOTAL HIP ARTHROPLASTY Right 4/8/2024    Procedure: REVISION, TOTAL ARTHROPLASTY, HIP - revision R COLLIN with abx spacer;  Surgeon: Jose Luna MD;  Location: [facility] OR;  Service: Orthopedics;  Laterality: Right;  revision R COLLIN with abx spacer    VEIN SURGERY          Current Outpatient Medications   Medication Sig    allopurinoL (ZYLOPRIM) 300 MG tablet Take 1 tablet by mouth twice daily    amoxicillin-clavulanate 875-125mg (AUGMENTIN) 875-125 mg per tablet Take 1 tablet by mouth 2 (two) times daily.    doxazosin (CARDURA) 4 MG tablet Take 1 tablet by mouth once daily    ELIQUIS 5 mg Tab Take 5 mg by mouth 2 (two) times daily.    finasteride (PROSCAR) 5 mg tablet Take 1 tablet by mouth once daily    loratadine (CLARITIN) 10 mg tablet Take 10 mg by mouth daily as needed.    meclizine (ANTIVERT) 25 mg tablet Take 1 tablet (25 mg total) by mouth 3 (three) times daily as needed for Dizziness.    rosuvastatin (CRESTOR) 20 MG tablet Take 20 mg by mouth every evening.    semaglutide (OZEMPIC) 1 mg/dose (4 mg/3 mL) Inject 1 mg into the skin every 7 days.     No current facility-administered medications for this visit.       Review of patient's allergies indicates:   Allergen Reactions    Clindamycin Rash    Levofloxacin Hives       Family History   Problem Relation Name Age of Onset    Coronary artery disease Mother      Cancer Father Perez coleman sr.     Heart disease Father Perez coleman sr.     Thyroid disease Sister         Social History     Socioeconomic History    Marital status:    Tobacco Use    Smoking status: Never    Smokeless tobacco: Never   Substance and Sexual Activity    Alcohol use: Not Currently    Drug use: Not Currently     Types: Marijuana    Sexual activity: Yes     Partners: Female           Review of Systems:    Constitution: Negative for chills, fever, and sweats.  Negative for unexplained weight loss.    HENT:  Negative for headaches and blurry vision.    Cardiovascular:Negative for chest pain or irregular heart beat. Negative for hypertension.    Respiratory:  Negative for cough and shortness of breath.    Gastrointestinal: Negative for abdominal pain, heartburn, melena, nausea, and vomitting.    Genitourinary:  Negative bladder incontinence and  dysuria.    Musculoskeletal:  See HPI    Neurological: Negative for numbness.    Psychiatric/Behavioral: Negative for depression.  The patient is not nervous/anxious.      Endocrine: Negative for polyuria    Hematologic/Lymphatic: Negative for bleeding problem.  Does not bruise/bleed easily.    Skin: Negative for poor would healing and rash      Physical Examination:    Vital Signs:    Vitals:    10/01/24 1553   BP: (!) 142/78   Pulse: 88       There is no height or weight on file to calculate BMI.    General: No acute distress, alert and oriented, healthy appearing    HEENT: Head is atraumatic, mucous membranes are moist    Neck: Supples, no JVD    Cardiovascular: Palpable dorsalis pedis and posterior tibial pulses, regular rate and rhythm to those pulses    Lungs: Breathing non-labored    Skin: no rashes appreciated    Neurologic: Can flex and extend knees, ankles, and toes. Sensation is grossly intact    Patient was significant crepitus range of motion to both hips.  Brisk cap refill disappeared sensation intact distally.    X-rays:  Three views of bilateral knees reviewed.  Patient was end-stage osteoarthritis.  Complete loss of joint space and bone-on-bone articulation.     Assessment::  Bilateral knee osteoarthritis    Plan:  Patient was tried cortisone injections.  It was tried Zilretta injections.  We will do a Synvisc injection today to try to give him some relief.  It was we could also do IO vera.  It was current height with the patient was not a surgical candidate given his elevated BMI.  Given his history of infection with regards to the right hip, we would need to be completely medically optimized prior to doing anything to his other joints.  We would also have to continue and finish up a workup for his hip pulmonary infection perspective.  We will see him back in a few months for other injections or sooner if she wishes to try something like IO vera.    This note was generated with the assistance of  ambient listening technology. Verbal consent was obtained by the patient and accompanying visitor(s) for the recording of patient appointment to facilitate this note. I attest to having reviewed and edited the generated note for accuracy, though some syntax or spelling errors may persist. Please contact the author of this note for any clarification.      This note was created using [employer] voice recognition software that occasionally misinterpreted phrases or words.    Consult note is delivered via Epic messaging service.

## 2024-10-09 ENCOUNTER — TELEPHONE (OUTPATIENT)
Dept: FAMILY MEDICINE | Facility: CLINIC | Age: 79
End: 2024-10-09
Payer: MEDICARE

## 2024-10-09 NOTE — TELEPHONE ENCOUNTER
----- Message from Zena sent at 10/9/2024  9:11 AM CDT -----  Who Called: Chris Mendoza II    Caller is requesting assistance/information from provider's office.    Symptoms (please be specific):    How long has patient had these symptoms:    List of preferred pharmacies on file (remove unneeded): [unfilled]  If different, enter pharmacy into here including location and phone number:       Preferred Method of Contact: Phone Call  Patient's Preferred Phone Number on File: [phone]   Best Call Back Number, if different:  Additional Information:  Pt stated that at OV on 9/24 he was informed not to get the flu shot due to having vertigo. Pt stated that he is still having some vertigo but would like to know if he can take the COVID vaccine, please advise.

## 2024-10-18 DIAGNOSIS — N40.0 BENIGN PROSTATIC HYPERPLASIA, UNSPECIFIED WHETHER LOWER URINARY TRACT SYMPTOMS PRESENT: ICD-10-CM

## 2024-10-18 RX ORDER — FINASTERIDE 5 MG/1
TABLET, FILM COATED ORAL
Qty: 90 TABLET | Refills: 0 | Status: SHIPPED | OUTPATIENT
Start: 2024-10-18

## 2024-12-11 ENCOUNTER — TELEPHONE (OUTPATIENT)
Dept: FAMILY MEDICINE | Facility: CLINIC | Age: 79
End: 2024-12-11
Payer: MEDICARE

## 2024-12-11 NOTE — TELEPHONE ENCOUNTER
----- Message from Adolfo sent at 12/11/2024  8:30 AM CST -----  .Type:  Needs Medical Advice    Who Called: Chris  Symptoms (please be specific):    How long has patient had these symptoms:    Pharmacy name and phone #:    Would the patient rather a call back or a response via [facility]?   Best Call Back Number: [phone]  Additional Information: Requested a call back he needed to know if he and his wife would need to take the RSV vaccine, please call him back when available. Thanks.

## 2024-12-11 NOTE — TELEPHONE ENCOUNTER
Spoke with CHARO Riojas in office she does recommend they both get RSV vaccine at pharmacy.    Spoke with patient, voiced understanding. They will both go to pharmacy for RSV vaccine

## 2024-12-31 ENCOUNTER — TELEPHONE (OUTPATIENT)
Dept: FAMILY MEDICINE | Facility: CLINIC | Age: 79
End: 2024-12-31
Payer: MEDICARE

## 2025-01-02 ENCOUNTER — LAB VISIT (OUTPATIENT)
Dept: LAB | Facility: HOSPITAL | Age: 80
End: 2025-01-02
Attending: NURSE PRACTITIONER
Payer: MEDICARE

## 2025-01-02 DIAGNOSIS — E11.22 TYPE 2 DIABETES MELLITUS WITH STAGE 3A CHRONIC KIDNEY DISEASE, WITHOUT LONG-TERM CURRENT USE OF INSULIN: ICD-10-CM

## 2025-01-02 DIAGNOSIS — N18.31 TYPE 2 DIABETES MELLITUS WITH STAGE 3A CHRONIC KIDNEY DISEASE, WITHOUT LONG-TERM CURRENT USE OF INSULIN: ICD-10-CM

## 2025-01-02 DIAGNOSIS — I10 PRIMARY HYPERTENSION: ICD-10-CM

## 2025-01-02 DIAGNOSIS — E78.2 MIXED HYPERLIPIDEMIA: ICD-10-CM

## 2025-01-02 DIAGNOSIS — N18.31 STAGE 3A CHRONIC KIDNEY DISEASE: ICD-10-CM

## 2025-01-02 LAB
ALBUMIN SERPL-MCNC: 4 G/DL (ref 3.4–4.8)
ALBUMIN/GLOB SERPL: 1.2 RATIO (ref 1.1–2)
ALP SERPL-CCNC: 77 UNIT/L (ref 40–150)
ALT SERPL-CCNC: 12 UNIT/L (ref 0–55)
ANION GAP SERPL CALC-SCNC: 9 MEQ/L
AST SERPL-CCNC: 14 UNIT/L (ref 5–34)
BASOPHILS # BLD AUTO: 0.02 X10(3)/MCL
BASOPHILS NFR BLD AUTO: 0.3 %
BILIRUB SERPL-MCNC: 1 MG/DL
BUN SERPL-MCNC: 15 MG/DL (ref 8.4–25.7)
CALCIUM SERPL-MCNC: 9.4 MG/DL (ref 8.8–10)
CHLORIDE SERPL-SCNC: 107 MMOL/L (ref 98–107)
CHOLEST SERPL-MCNC: 98 MG/DL
CHOLEST/HDLC SERPL: 3 {RATIO} (ref 0–5)
CO2 SERPL-SCNC: 23 MMOL/L (ref 23–31)
CREAT SERPL-MCNC: 1.22 MG/DL (ref 0.72–1.25)
CREAT/UREA NIT SERPL: 12
EOSINOPHIL # BLD AUTO: 0.15 X10(3)/MCL (ref 0–0.9)
EOSINOPHIL NFR BLD AUTO: 2.2 %
ERYTHROCYTE [DISTWIDTH] IN BLOOD BY AUTOMATED COUNT: 15 % (ref 11.5–17)
EST. AVERAGE GLUCOSE BLD GHB EST-MCNC: 125.5 MG/DL
GFR SERPLBLD CREATININE-BSD FMLA CKD-EPI: >60 ML/MIN/1.73/M2
GLOBULIN SER-MCNC: 3.3 GM/DL (ref 2.4–3.5)
GLUCOSE SERPL-MCNC: 105 MG/DL (ref 82–115)
HBA1C MFR BLD: 6 %
HCT VFR BLD AUTO: 44.2 % (ref 42–52)
HDLC SERPL-MCNC: 31 MG/DL (ref 35–60)
HGB BLD-MCNC: 13.8 G/DL (ref 14–18)
IMM GRANULOCYTES # BLD AUTO: 0.01 X10(3)/MCL (ref 0–0.04)
IMM GRANULOCYTES NFR BLD AUTO: 0.1 %
LDLC SERPL CALC-MCNC: 52 MG/DL (ref 50–140)
LYMPHOCYTES # BLD AUTO: 1.37 X10(3)/MCL (ref 0.6–4.6)
LYMPHOCYTES NFR BLD AUTO: 20.1 %
MCH RBC QN AUTO: 27.6 PG (ref 27–31)
MCHC RBC AUTO-ENTMCNC: 31.2 G/DL (ref 33–36)
MCV RBC AUTO: 88.4 FL (ref 80–94)
MONOCYTES # BLD AUTO: 0.5 X10(3)/MCL (ref 0.1–1.3)
MONOCYTES NFR BLD AUTO: 7.3 %
NEUTROPHILS # BLD AUTO: 4.77 X10(3)/MCL (ref 2.1–9.2)
NEUTROPHILS NFR BLD AUTO: 70 %
PLATELET # BLD AUTO: 175 X10(3)/MCL (ref 130–400)
PMV BLD AUTO: 9.8 FL (ref 7.4–10.4)
POTASSIUM SERPL-SCNC: 4.2 MMOL/L (ref 3.5–5.1)
PROT SERPL-MCNC: 7.3 GM/DL (ref 5.8–7.6)
RBC # BLD AUTO: 5 X10(6)/MCL (ref 4.7–6.1)
SODIUM SERPL-SCNC: 139 MMOL/L (ref 136–145)
TRIGL SERPL-MCNC: 73 MG/DL (ref 34–140)
VLDLC SERPL CALC-MCNC: 15 MG/DL
WBC # BLD AUTO: 6.82 X10(3)/MCL (ref 4.5–11.5)

## 2025-01-02 PROCEDURE — 80061 LIPID PANEL: CPT

## 2025-01-02 PROCEDURE — 83036 HEMOGLOBIN GLYCOSYLATED A1C: CPT

## 2025-01-02 PROCEDURE — 80053 COMPREHEN METABOLIC PANEL: CPT

## 2025-01-02 PROCEDURE — 36415 COLL VENOUS BLD VENIPUNCTURE: CPT

## 2025-01-02 PROCEDURE — 85025 COMPLETE CBC W/AUTO DIFF WBC: CPT

## 2025-01-07 ENCOUNTER — OFFICE VISIT (OUTPATIENT)
Dept: ORTHOPEDICS | Facility: CLINIC | Age: 80
End: 2025-01-07
Payer: MEDICARE

## 2025-01-07 ENCOUNTER — CLINICAL SUPPORT (OUTPATIENT)
Dept: FAMILY MEDICINE | Facility: CLINIC | Age: 80
End: 2025-01-07
Attending: NURSE PRACTITIONER
Payer: MEDICARE

## 2025-01-07 ENCOUNTER — OFFICE VISIT (OUTPATIENT)
Dept: FAMILY MEDICINE | Facility: CLINIC | Age: 80
End: 2025-01-07
Payer: MEDICARE

## 2025-01-07 VITALS
HEIGHT: 72 IN | DIASTOLIC BLOOD PRESSURE: 76 MMHG | TEMPERATURE: 98 F | HEART RATE: 77 BPM | RESPIRATION RATE: 16 BRPM | SYSTOLIC BLOOD PRESSURE: 139 MMHG | WEIGHT: 315 LBS | OXYGEN SATURATION: 98 % | BODY MASS INDEX: 42.66 KG/M2

## 2025-01-07 VITALS
DIASTOLIC BLOOD PRESSURE: 79 MMHG | HEIGHT: 72 IN | BODY MASS INDEX: 45.71 KG/M2 | HEART RATE: 79 BPM | SYSTOLIC BLOOD PRESSURE: 141 MMHG

## 2025-01-07 DIAGNOSIS — N18.31 TYPE 2 DIABETES MELLITUS WITH STAGE 3A CHRONIC KIDNEY DISEASE, WITHOUT LONG-TERM CURRENT USE OF INSULIN: ICD-10-CM

## 2025-01-07 DIAGNOSIS — I48.20 CHRONIC ATRIAL FIBRILLATION: ICD-10-CM

## 2025-01-07 DIAGNOSIS — M10.9 GOUT, UNSPECIFIED CAUSE, UNSPECIFIED CHRONICITY, UNSPECIFIED SITE: ICD-10-CM

## 2025-01-07 DIAGNOSIS — E11.22 TYPE 2 DIABETES MELLITUS WITH STAGE 3A CHRONIC KIDNEY DISEASE, WITHOUT LONG-TERM CURRENT USE OF INSULIN: Primary | ICD-10-CM

## 2025-01-07 DIAGNOSIS — E66.01 MORBID OBESITY: ICD-10-CM

## 2025-01-07 DIAGNOSIS — M17.12 PRIMARY OSTEOARTHRITIS OF LEFT KNEE: ICD-10-CM

## 2025-01-07 DIAGNOSIS — N18.31 STAGE 3A CHRONIC KIDNEY DISEASE: ICD-10-CM

## 2025-01-07 DIAGNOSIS — N18.31 TYPE 2 DIABETES MELLITUS WITH STAGE 3A CHRONIC KIDNEY DISEASE, WITHOUT LONG-TERM CURRENT USE OF INSULIN: Primary | ICD-10-CM

## 2025-01-07 DIAGNOSIS — E78.2 MIXED HYPERLIPIDEMIA: ICD-10-CM

## 2025-01-07 DIAGNOSIS — I10 PRIMARY HYPERTENSION: Primary | ICD-10-CM

## 2025-01-07 DIAGNOSIS — E11.22 TYPE 2 DIABETES MELLITUS WITH STAGE 3A CHRONIC KIDNEY DISEASE, WITHOUT LONG-TERM CURRENT USE OF INSULIN: ICD-10-CM

## 2025-01-07 DIAGNOSIS — R42 VERTIGO: ICD-10-CM

## 2025-01-07 DIAGNOSIS — M17.11 PRIMARY OSTEOARTHRITIS OF RIGHT KNEE: Primary | ICD-10-CM

## 2025-01-07 PROCEDURE — G2211 COMPLEX E/M VISIT ADD ON: HCPCS | Mod: ,,, | Performed by: NURSE PRACTITIONER

## 2025-01-07 PROCEDURE — 92228 IMG RTA DETC/MNTR DS PHY/QHP: CPT | Mod: 26,,, | Performed by: OPTOMETRIST

## 2025-01-07 PROCEDURE — 99214 OFFICE O/P EST MOD 30 MIN: CPT | Mod: ,,, | Performed by: NURSE PRACTITIONER

## 2025-01-07 PROCEDURE — 20610 DRAIN/INJ JOINT/BURSA W/O US: CPT | Mod: 50,,, | Performed by: NURSE PRACTITIONER

## 2025-01-07 PROCEDURE — 99213 OFFICE O/P EST LOW 20 MIN: CPT | Mod: 25,,, | Performed by: NURSE PRACTITIONER

## 2025-01-07 PROCEDURE — 92228 IMG RTA DETC/MNTR DS PHY/QHP: CPT | Mod: TC,,, | Performed by: INTERNAL MEDICINE

## 2025-01-07 RX ORDER — BETAMETHASONE SODIUM PHOSPHATE AND BETAMETHASONE ACETATE 3; 3 MG/ML; MG/ML
12 INJECTION, SUSPENSION INTRA-ARTICULAR; INTRALESIONAL; INTRAMUSCULAR; SOFT TISSUE
Status: DISCONTINUED | OUTPATIENT
Start: 2025-01-07 | End: 2025-01-07 | Stop reason: HOSPADM

## 2025-01-07 RX ORDER — MECLIZINE HYDROCHLORIDE 25 MG/1
25 TABLET ORAL 3 TIMES DAILY PRN
Qty: 270 TABLET | Refills: 3 | Status: SHIPPED | OUTPATIENT
Start: 2025-01-07 | End: 2026-01-07

## 2025-01-07 RX ORDER — ALLOPURINOL 300 MG/1
300 TABLET ORAL DAILY
Qty: 90 TABLET | Refills: 3 | Status: SHIPPED | OUTPATIENT
Start: 2025-01-07

## 2025-01-07 RX ORDER — LIDOCAINE HYDROCHLORIDE 20 MG/ML
5 INJECTION, SOLUTION EPIDURAL; INFILTRATION; INTRACAUDAL; PERINEURAL
Status: DISCONTINUED | OUTPATIENT
Start: 2025-01-07 | End: 2025-01-07 | Stop reason: HOSPADM

## 2025-01-07 RX ADMIN — LIDOCAINE HYDROCHLORIDE 5 ML: 20 INJECTION, SOLUTION EPIDURAL; INFILTRATION; INTRACAUDAL; PERINEURAL at 02:01

## 2025-01-07 RX ADMIN — BETAMETHASONE SODIUM PHOSPHATE AND BETAMETHASONE ACETATE 12 MG: 3; 3 INJECTION, SUSPENSION INTRA-ARTICULAR; INTRALESIONAL; INTRAMUSCULAR; SOFT TISSUE at 02:01

## 2025-01-07 NOTE — Clinical Note
Correction: NO DR OS, but OD not centered so Unable to assess photos.  Photos should be retaken, or next avail eye exam scheduled

## 2025-01-07 NOTE — PROGRESS NOTES
No apparent retinopathy but right eye not centered, needs photos retaken or eye exam.  He can come in for a retake or schedule for an eye exam

## 2025-01-07 NOTE — H&P (VIEW-ONLY)
Chief Complaint:   Chief Complaint   Patient presents with    Left Knee - Follow-up     F/u KASSIDY Synvisc injection but it didn't last long done on 10/1/24. He would like a cortisone but has questions about the Iovera injection. He does have cane on today    Right Knee - Follow-up     F/u KASSIDY Synvisc injection but didn't last long 10/1/24       History of present illness: Chris Mendoza II is a 79 y.o. male, presents to clinic today in regards to bilateral knees.  Patient states that the Synvisc injections he received upon his last appointment only lasted for about a week.  Here today in a wheelchair.  He is ambulatory outside of the office.  Continues to have pain throughout the knees.  We would like to speak about his different options going forward it is not a good surgical candidate.    Past Medical History:   Diagnosis Date    Arthritis     Atrial fibrillation     Bladder cancer     CKD (chronic kidney disease)     Diabetes mellitus, type 2     Gout, unspecified     Hyperlipidemia     Hypertension     Restless leg syndrome     Seasonal allergies     Sleep apnea, unspecified     Thyroid disease        Past Surgical History:   Procedure Laterality Date    ARTHROGRAM Right 02/15/2024    Procedure: ARTHROGRAM  aspiration right hip with anesthesia #1;  Surgeon: Jose Luna MD;  Location: [facility];  Service: Orthopedics;  Laterality: Right;  aspiration right hip with anesthesia    BLADDER SURGERY  2009    removed Ca    CARDIAC CATHETERIZATION      CARDIOVERSION      CATARACT EXTRACTION W/  INTRAOCULAR LENS IMPLANT Bilateral     JOINT REPLACEMENT Right     Total Hip    MULTIPLE TOOTH EXTRACTIONS      REVISION TOTAL HIP ARTHROPLASTY Right 4/8/2024    Procedure: REVISION, TOTAL ARTHROPLASTY, HIP - revision R COLLIN with abx spacer;  Surgeon: Jose Luna MD;  Location: [facility] OR;  Service: Orthopedics;  Laterality: Right;  revision R COLLIN with abx spacer    VEIN SURGERY         Current Outpatient Medications    Medication Sig    allopurinoL (ZYLOPRIM) 300 MG tablet Take 1 tablet (300 mg total) by mouth once daily.    doxazosin (CARDURA) 4 MG tablet Take 1 tablet by mouth once daily    ELIQUIS 5 mg Tab Take 5 mg by mouth 2 (two) times daily.    finasteride (PROSCAR) 5 mg tablet Take 1 tablet by mouth once daily    loratadine (CLARITIN) 10 mg tablet Take 10 mg by mouth daily as needed.    meclizine (ANTIVERT) 25 mg tablet Take 1 tablet (25 mg total) by mouth 3 (three) times daily as needed for Dizziness.    rosuvastatin (CRESTOR) 20 MG tablet Take 20 mg by mouth every evening.    semaglutide (OZEMPIC) 1 mg/dose (4 mg/3 mL) Inject 1 mg into the skin every 7 days.     No current facility-administered medications for this visit.       Review of patient's allergies indicates:   Allergen Reactions    Clindamycin Rash    Levofloxacin Hives       Family History   Problem Relation Name Age of Onset    Coronary artery disease Mother      Cancer Father Chris cee sr.     Heart disease Father Chris cee sr.     Thyroid disease Sister         Social History     Socioeconomic History    Marital status:    Tobacco Use    Smoking status: Never    Smokeless tobacco: Never   Substance and Sexual Activity    Alcohol use: Not Currently    Drug use: Not Currently     Types: Marijuana    Sexual activity: Yes     Partners: Female           Review of Systems:    Denies fevers, chills, chest pain, shortness of breath. Comprehensive review of systems performed and otherwise negative except as noted in HPI     Physical Examination:    General: awake and alert, no acute distress, healthy appearing  Head and Neck: Head atraumatic/normocephalic. Moist MM  CV: brisk cap refill  Lungs: non-labored breathing, w/o cough or SOB  Skin: no rashes present, warm to touch  Neuro: sensation grossly intact distally       Vital Signs:    Vitals:    01/07/25 1507   BP: (!) 141/79   Pulse: 79       Body mass index is 45.71 kg/m².    Bilateral  knees:  Crepitus range of motion of both knees.  Brisk cap refill disappeared sensation intact distally.     Assessment::  Primary osteoarthritis bilateral knees    Plan:  Patient presents to clinic today and we would like bilateral cortisone injections.  We will get him these cortisone injections to help calm his knee pain down.  He is also interested in an IO vera.  We will get this approved through his insurance and have him come into the hospital January 31st for the IOVERA procedure.  Follow up in the office soon after this reassess his knee pain.  Patient states understanding and agrees with plan of care.    This note was created using [employer] voice recognition software that occasionally misinterpreted phrases or words.    Consult note is delivered via Epic messaging service.

## 2025-01-07 NOTE — PROGRESS NOTES
Chief Complaint:   Chief Complaint   Patient presents with    Left Knee - Follow-up     F/u KSASIDY Synvisc injection but it didn't last long done on 10/1/24. He would like a cortisone but has questions about the Iovera injection. He does have cane on today    Right Knee - Follow-up     F/u KASSIDY Synvisc injection but didn't last long 10/1/24       History of present illness: Chris Mendoza II is a 79 y.o. male, presents to clinic today in regards to bilateral knees.  Patient states that the Synvisc injections he received upon his last appointment only lasted for about a week.  Here today in a wheelchair.  He is ambulatory outside of the office.  Continues to have pain throughout the knees.  We would like to speak about his different options going forward it is not a good surgical candidate.    Past Medical History:   Diagnosis Date    Arthritis     Atrial fibrillation     Bladder cancer     CKD (chronic kidney disease)     Diabetes mellitus, type 2     Gout, unspecified     Hyperlipidemia     Hypertension     Restless leg syndrome     Seasonal allergies     Sleep apnea, unspecified     Thyroid disease        Past Surgical History:   Procedure Laterality Date    ARTHROGRAM Right 02/15/2024    Procedure: ARTHROGRAM  aspiration right hip with anesthesia #1;  Surgeon: Jose Luna MD;  Location: [facility];  Service: Orthopedics;  Laterality: Right;  aspiration right hip with anesthesia    BLADDER SURGERY  2009    removed Ca    CARDIAC CATHETERIZATION      CARDIOVERSION      CATARACT EXTRACTION W/  INTRAOCULAR LENS IMPLANT Bilateral     JOINT REPLACEMENT Right     Total Hip    MULTIPLE TOOTH EXTRACTIONS      REVISION TOTAL HIP ARTHROPLASTY Right 4/8/2024    Procedure: REVISION, TOTAL ARTHROPLASTY, HIP - revision R COLLIN with abx spacer;  Surgeon: Jose Luna MD;  Location: [facility] OR;  Service: Orthopedics;  Laterality: Right;  revision R COLLIN with abx spacer    VEIN SURGERY         Current Outpatient Medications    Medication Sig    allopurinoL (ZYLOPRIM) 300 MG tablet Take 1 tablet (300 mg total) by mouth once daily.    doxazosin (CARDURA) 4 MG tablet Take 1 tablet by mouth once daily    ELIQUIS 5 mg Tab Take 5 mg by mouth 2 (two) times daily.    finasteride (PROSCAR) 5 mg tablet Take 1 tablet by mouth once daily    loratadine (CLARITIN) 10 mg tablet Take 10 mg by mouth daily as needed.    meclizine (ANTIVERT) 25 mg tablet Take 1 tablet (25 mg total) by mouth 3 (three) times daily as needed for Dizziness.    rosuvastatin (CRESTOR) 20 MG tablet Take 20 mg by mouth every evening.    semaglutide (OZEMPIC) 1 mg/dose (4 mg/3 mL) Inject 1 mg into the skin every 7 days.     No current facility-administered medications for this visit.       Review of patient's allergies indicates:   Allergen Reactions    Clindamycin Rash    Levofloxacin Hives       Family History   Problem Relation Name Age of Onset    Coronary artery disease Mother      Cancer Father Chris cee sr.     Heart disease Father Chris cee sr.     Thyroid disease Sister         Social History     Socioeconomic History    Marital status:    Tobacco Use    Smoking status: Never    Smokeless tobacco: Never   Substance and Sexual Activity    Alcohol use: Not Currently    Drug use: Not Currently     Types: Marijuana    Sexual activity: Yes     Partners: Female           Review of Systems:    Denies fevers, chills, chest pain, shortness of breath. Comprehensive review of systems performed and otherwise negative except as noted in HPI     Physical Examination:    General: awake and alert, no acute distress, healthy appearing  Head and Neck: Head atraumatic/normocephalic. Moist MM  CV: brisk cap refill  Lungs: non-labored breathing, w/o cough or SOB  Skin: no rashes present, warm to touch  Neuro: sensation grossly intact distally       Vital Signs:    Vitals:    01/07/25 1507   BP: (!) 141/79   Pulse: 79       Body mass index is 45.71 kg/m².    Bilateral  knees:  Crepitus range of motion of both knees.  Brisk cap refill disappeared sensation intact distally.     Assessment::  Primary osteoarthritis bilateral knees    Plan:  Patient presents to clinic today and we would like bilateral cortisone injections.  We will get him these cortisone injections to help calm his knee pain down.  He is also interested in an IO vera.  We will get this approved through his insurance and have him come into the hospital January 31st for the IOVERA procedure.  Follow up in the office soon after this reassess his knee pain.  Patient states understanding and agrees with plan of care.    This note was created using [employer] voice recognition software that occasionally misinterpreted phrases or words.    Consult note is delivered via Epic messaging service.

## 2025-01-07 NOTE — PROGRESS NOTES
Subjective:       Patient ID: Chris Mendoza II is a 79 y.o. male.    Chief Complaint: Hypertension (3m fu), Hyperlipidemia (3m fu), Chronic Kidney Disease (3m fu), Diabetes (3m fu), and Obesity (3m fu)      HPI   This is a 79-year-old white male who presents to clinic today for three-month follow-up for hypertension, hyperlipidemia, AFib, CKD, diabetes, obesity, gout, vertigo.  Reports that he is still having vertigo.  It has improved but is having to take the meclizine at least once daily.  Does have episodes where symptoms are severe but they only last a few minutes.  Was not able to complete physical therapy for vertigo as referred by the ENT because it was in [place].  Review of Systems  Comprehensive review of systems negative except as stated in HPI    The patient's Health Maintenance was reviewed and the following appears to be due:   There are no preventive care reminders to display for this patient.    Past Medical History:  Past Medical History:   Diagnosis Date    Arthritis     Atrial fibrillation     Bladder cancer     CKD (chronic kidney disease)     Diabetes mellitus, type 2     Gout, unspecified     Hyperlipidemia     Hypertension     Restless leg syndrome     Seasonal allergies     Sleep apnea, unspecified     Thyroid disease      Past Surgical History:   Procedure Laterality Date    ARTHROGRAM Right 02/15/2024    Procedure: ARTHROGRAM  aspiration right hip with anesthesia #1;  Surgeon: Jose Luna MD;  Location: [facility];  Service: Orthopedics;  Laterality: Right;  aspiration right hip with anesthesia    BLADDER SURGERY  2009    removed Ca    CARDIAC CATHETERIZATION      CARDIOVERSION      CATARACT EXTRACTION W/  INTRAOCULAR LENS IMPLANT Bilateral     JOINT REPLACEMENT Right     Total Hip    MULTIPLE TOOTH EXTRACTIONS      REVISION TOTAL HIP ARTHROPLASTY Right 4/8/2024    Procedure: REVISION, TOTAL ARTHROPLASTY, HIP - revision R COLLIN with abx spacer;  Surgeon: Jose Luna MD;  Location:  [facility] OR;  Service: Orthopedics;  Laterality: Right;  revision R COLLIN with abx spacer    VEIN SURGERY       Review of patient's allergies indicates:   Allergen Reactions    Clindamycin Rash    Levofloxacin Hives     Current Outpatient Medications on File Prior to Visit   Medication Sig Dispense Refill    doxazosin (CARDURA) 4 MG tablet Take 1 tablet by mouth once daily 90 tablet 2    ELIQUIS 5 mg Tab Take 5 mg by mouth 2 (two) times daily.      finasteride (PROSCAR) 5 mg tablet Take 1 tablet by mouth once daily 90 tablet 0    loratadine (CLARITIN) 10 mg tablet Take 10 mg by mouth daily as needed.      rosuvastatin (CRESTOR) 20 MG tablet Take 20 mg by mouth every evening.      semaglutide (OZEMPIC) 1 mg/dose (4 mg/3 mL) Inject 1 mg into the skin every 7 days.      [DISCONTINUED] allopurinoL (ZYLOPRIM) 300 MG tablet Take 1 tablet by mouth twice daily 180 tablet 0    [DISCONTINUED] amoxicillin-clavulanate 875-125mg (AUGMENTIN) 875-125 mg per tablet Take 1 tablet by mouth 2 (two) times daily. 20 tablet 0    [DISCONTINUED] meclizine (ANTIVERT) 25 mg tablet Take 1 tablet (25 mg total) by mouth 3 (three) times daily as needed for Dizziness. 90 tablet 1     No current facility-administered medications on file prior to visit.     Social History     Socioeconomic History    Marital status:    Tobacco Use    Smoking status: Never    Smokeless tobacco: Never   Substance and Sexual Activity    Alcohol use: Not Currently    Drug use: Not Currently     Types: Marijuana    Sexual activity: Yes     Partners: Female     Family History   Problem Relation Name Age of Onset    Coronary artery disease Mother      Cancer Father Chris cee sr.     Heart disease Father Chris cee sr.     Thyroid disease Sister         Objective:       /76 (BP Location: Left arm)   Pulse 77   Temp 97.8 °F (36.6 °C) (Oral)   Resp 16   Ht 6' (1.829 m)   Wt (!) 152.9 kg (337 lb)   SpO2 98%   BMI 45.71 kg/m²      Physical Exam  Vitals and  nursing note reviewed.   Constitutional:       Appearance: Normal appearance. He is obese.   HENT:      Head: Normocephalic and atraumatic.      Right Ear: Tympanic membrane, ear canal and external ear normal.      Left Ear: Tympanic membrane, ear canal and external ear normal.      Nose: Congestion present.      Mouth/Throat:      Mouth: Mucous membranes are moist.      Pharynx: Oropharynx is clear.   Eyes:      Extraocular Movements: Extraocular movements intact.      Conjunctiva/sclera: Conjunctivae normal.      Pupils: Pupils are equal, round, and reactive to light.   Cardiovascular:      Rate and Rhythm: Normal rate. Rhythm irregularly irregular.      Pulses: Normal pulses.      Heart sounds: Normal heart sounds.   Pulmonary:      Effort: Pulmonary effort is normal.      Breath sounds: Normal breath sounds.   Musculoskeletal:         General: Normal range of motion.      Cervical back: Normal range of motion and neck supple.      Comments: Steady gait with the assistance of cane   Skin:     General: Skin is warm and dry.   Neurological:      General: No focal deficit present.      Mental Status: He is alert and oriented to person, place, and time.   Psychiatric:         Mood and Affect: Mood normal.         Behavior: Behavior normal.         Thought Content: Thought content normal.         Judgment: Judgment normal.         Labs  Lab Visit on 01/02/2025   Component Date Value Ref Range Status    Sodium 01/02/2025 139  136 - 145 mmol/L Final    Potassium 01/02/2025 4.2  3.5 - 5.1 mmol/L Final    Chloride 01/02/2025 107  98 - 107 mmol/L Final    CO2 01/02/2025 23  23 - 31 mmol/L Final    Glucose 01/02/2025 105  82 - 115 mg/dL Final    Blood Urea Nitrogen 01/02/2025 15.0  8.4 - 25.7 mg/dL Final    Creatinine 01/02/2025 1.22  0.72 - 1.25 mg/dL Final    Calcium 01/02/2025 9.4  8.8 - 10.0 mg/dL Final    Protein Total 01/02/2025 7.3  5.8 - 7.6 gm/dL Final    Albumin 01/02/2025 4.0  3.4 - 4.8 g/dL Final    Globulin  01/02/2025 3.3  2.4 - 3.5 gm/dL Final    Albumin/Globulin Ratio 01/02/2025 1.2  1.1 - 2.0 ratio Final    Bilirubin Total 01/02/2025 1.0  <=1.5 mg/dL Final    ALP 01/02/2025 77  40 - 150 unit/L Final    ALT 01/02/2025 12  0 - 55 unit/L Final    AST 01/02/2025 14  5 - 34 unit/L Final    eGFR 01/02/2025 >60  mL/min/1.73/m2 Final    Anion Gap 01/02/2025 9.0  mEq/L Final    BUN/Creatinine Ratio 01/02/2025 12   Final    Cholesterol Total 01/02/2025 98  <=200 mg/dL Final    HDL Cholesterol 01/02/2025 31 (L)  35 - 60 mg/dL Final    Triglyceride 01/02/2025 73  34 - 140 mg/dL Final    Cholesterol/HDL Ratio 01/02/2025 3  0 - 5 Final    Very Low Density Lipoprotein 01/02/2025 15   Final    LDL Cholesterol 01/02/2025 52.00  50.00 - 140.00 mg/dL Final    Hemoglobin A1c 01/02/2025 6.0  <=7.0 % Final    Estimated Average Glucose 01/02/2025 125.5  mg/dL Final    WBC 01/02/2025 6.82  4.50 - 11.50 x10(3)/mcL Final    RBC 01/02/2025 5.00  4.70 - 6.10 x10(6)/mcL Final    Hgb 01/02/2025 13.8 (L)  14.0 - 18.0 g/dL Final    Hct 01/02/2025 44.2  42.0 - 52.0 % Final    MCV 01/02/2025 88.4  80.0 - 94.0 fL Final    MCH 01/02/2025 27.6  27.0 - 31.0 pg Final    MCHC 01/02/2025 31.2 (L)  33.0 - 36.0 g/dL Final    RDW 01/02/2025 15.0  11.5 - 17.0 % Final    Platelet 01/02/2025 175  130 - 400 x10(3)/mcL Final    MPV 01/02/2025 9.8  7.4 - 10.4 fL Final    Neut % 01/02/2025 70.0  % Final    Lymph % 01/02/2025 20.1  % Final    Mono % 01/02/2025 7.3  % Final    Eos % 01/02/2025 2.2  % Final    Basophil % 01/02/2025 0.3  % Final    Lymph # 01/02/2025 1.37  0.6 - 4.6 x10(3)/mcL Final    Neut # 01/02/2025 4.77  2.1 - 9.2 x10(3)/mcL Final    Mono # 01/02/2025 0.50  0.1 - 1.3 x10(3)/mcL Final    Eos # 01/02/2025 0.15  0 - 0.9 x10(3)/mcL Final    Baso # 01/02/2025 0.02  <=0.2 x10(3)/mcL Final    Imm Gran # 01/02/2025 0.01  0 - 0.04 x10(3)/mcL Final    Imm Grans % 01/02/2025 0.1  % Final   Office Visit on 09/24/2024   Component Date Value Ref Range Status     Urine Microalbumin 09/25/2024 7.6  <=30.0 ug/mL Final    Urine Creatinine 09/25/2024 256.4 (H)  63.0 - 166.0 mg/dL Final    Microalbumin Creatinine Ratio 09/25/2024 3.0  0.0 - 30.0 mg/gm Cr Final   Lab Visit on 09/18/2024   Component Date Value Ref Range Status    Sodium 09/18/2024 143  136 - 145 mmol/L Final    Potassium 09/18/2024 4.1  3.5 - 5.1 mmol/L Final    Chloride 09/18/2024 111 (H)  98 - 107 mmol/L Final    CO2 09/18/2024 23  23 - 31 mmol/L Final    Glucose 09/18/2024 106  82 - 115 mg/dL Final    Blood Urea Nitrogen 09/18/2024 12.3  8.4 - 25.7 mg/dL Final    Creatinine 09/18/2024 1.19 (H)  0.73 - 1.18 mg/dL Final    Calcium 09/18/2024 10.0  8.8 - 10.0 mg/dL Final    Protein Total 09/18/2024 7.4  5.8 - 7.6 gm/dL Final    Albumin 09/18/2024 3.9  3.4 - 4.8 g/dL Final    Globulin 09/18/2024 3.5  2.4 - 3.5 gm/dL Final    Albumin/Globulin Ratio 09/18/2024 1.1  1.1 - 2.0 ratio Final    Bilirubin Total 09/18/2024 1.0  <=1.5 mg/dL Final    ALP 09/18/2024 86  40 - 150 unit/L Final    ALT 09/18/2024 14  0 - 55 unit/L Final    AST 09/18/2024 17  5 - 34 unit/L Final    eGFR 09/18/2024 >60  mL/min/1.73/m2 Final    Anion Gap 09/18/2024 9.0  mEq/L Final    BUN/Creatinine Ratio 09/18/2024 10   Final    Hemoglobin A1c 09/18/2024 5.7  <=7.0 % Final    Estimated Average Glucose 09/18/2024 116.9  mg/dL Final    Prostate Specific Antigen 09/18/2024 0.10  <=4.00 ng/mL Final       Assessment and Plan       ICD-10-CM ICD-9-CM   1. Primary hypertension  I10 401.9   2. Mixed hyperlipidemia  E78.2 272.2   3. Chronic atrial fibrillation  I48.20 427.31   4. Stage 3a chronic kidney disease  N18.31 585.3   5. Type 2 diabetes mellitus with stage 3a chronic kidney disease, without long-term current use of insulin  E11.22 250.40    N18.31 585.3   6. Morbid obesity  E66.01 278.01   7. Gout, unspecified cause, unspecified chronicity, unspecified site  M10.9 274.9   8. Vertigo  R42 780.4        1. Primary hypertension  Overview:  Previously  Triamterene HCTZ 37.5/25 mg 2 capsules daily    Assessment & Plan:  Stable without medication, continue to monitor blood pressure.  Follow-up here in 5 months      2. Mixed hyperlipidemia  Overview:  Rosuvastatin 20 mg daily    Assessment & Plan:  Stable, total cholesterol 98, LDL 52.  Continue rosuvastatin 20 mg daily, follow-up 5 months.    Orders:  -     Comprehensive Metabolic Panel; Future; Expected date: 06/16/2025  -     Lipid Panel; Future; Expected date: 06/16/2025    3. Chronic atrial fibrillation  Overview:  Dr Gama Chaney 5 mg twice daily    Assessment & Plan:  Stable, continue management per Dr. Malloy, follow-up 5 months      4. Stage 3a chronic kidney disease  Assessment & Plan:  Stable, BUN 15, creatinine 1.22, EGFR greater than 60.  Continue renal protective measures, follow-up 5 months    Orders:  -     CBC Auto Differential; Future; Expected date: 06/16/2025  -     Comprehensive Metabolic Panel; Future; Expected date: 06/16/2025    5. Type 2 diabetes mellitus with stage 3a chronic kidney disease, without long-term current use of insulin  Overview:  Previously on Glipizide XL 2.5 mg daily    03/08/2022 - Hgb A1c 7.6, increase Glipizide XL to 5 mg daily  09/14/2022 - Hgb A1c 7.9, add Tradjenta 5 mg daily  12/13/2022 - Hgb A1c 8.6, never started Tradjenta due to cost. Increase Glipizide XL to 10 mg daily. Refer to Dr Amos   03/16/2023 - Hgb A1c 6.6, never went to endocrinology, did not increase Glipizide to 10 mg daily, new RX sent today for Glipizide XL 10 mg daily  09/21/2023 - Hgb A1c 7.9, add pioglitazone 30 mg daily  11/2023 - Ozempic patient assistance program approved, start Ozempic  06/13/2024 - Hgb A1c 4.8, DC glipizide and pioglitazone      Assessment & Plan:  Stable, hemoglobin A1c 6.0.  Continue Ozempic 1 mg weekly. , follow-up 5 months with labs prior.  In office retina screening photo completed today.    Orders:  -     Diabetic Eye Screening Photo  -     Comprehensive  Metabolic Panel; Future; Expected date: 06/16/2025  -     Hemoglobin A1C; Future; Expected date: 06/16/2025    6. Morbid obesity  Assessment & Plan:  Continue Ozempic for type 2 diabetes and obesity, continue with calorie restricted diet.      7. Gout, unspecified cause, unspecified chronicity, unspecified site  Overview:  Allopurinol 300 mg daily    Assessment & Plan:  Continue allopurinol 300 mg daily, follow-up in 5 months with uric acid level prior.    Orders:  -     Uric Acid; Future; Expected date: 06/16/2025  -     allopurinoL (ZYLOPRIM) 300 MG tablet; Take 1 tablet (300 mg total) by mouth once daily.  Dispense: 90 tablet; Refill: 3    8. Vertigo  Assessment & Plan:  Saw Dr. Metz, was referred to vestibular therapy but it was in [place] so he did not attend.  Will refer to outpatient therapy at [facility] for vestibular therapy.  Okay to continue meclizine 3 times daily as needed.    Orders:  -     Ambulatory Referral/Consult to Physical Therapy; Future; Expected date: 01/14/2025  -     meclizine (ANTIVERT) 25 mg tablet; Take 1 tablet (25 mg total) by mouth 3 (three) times daily as needed for Dizziness.  Dispense: 270 tablet; Refill: 3           Follow up in 5 months (on 6/23/2025) for Annual.

## 2025-01-07 NOTE — ASSESSMENT & PLAN NOTE
Stable, BUN 15, creatinine 1.22, EGFR greater than 60.  Continue renal protective measures, follow-up 5 months

## 2025-01-07 NOTE — PROCEDURES
Large Joint Aspiration/Injection: bilateral knee    Date/Time: 1/7/2025 2:30 PM    Performed by: Little Contreras FNP  Authorized by: Little Contreras FNP    Consent Done?:  Yes (Verbal)  Indications:  Arthritis and pain  Timeout: prior to procedure the correct patient, procedure, and site was verified    Prep: patient was prepped and draped in usual sterile fashion    Local anesthesia used?: No      Details:  Needle Size:  22 G  Ultrasonic Guidance for needle placement?: No    Approach:  Anterolateral  Location:  Knee  Laterality:  Bilateral  Site:  Bilateral knee  Medications (Right):  5 mL LIDOcaine (PF) 20 mg/mL (2%) 20 mg/mL (2 %); 12 mg betamethasone acetate-betamethasone sodium phosphate 6 mg/mL  Medications (Left):  5 mL LIDOcaine (PF) 20 mg/mL (2%) 20 mg/mL (2 %); 12 mg betamethasone acetate-betamethasone sodium phosphate 6 mg/mL  Patient tolerance:  Patient tolerated the procedure well with no immediate complications

## 2025-01-07 NOTE — ASSESSMENT & PLAN NOTE
Saw Dr. Metz, was referred to vestibular therapy but it was in [place] so he did not attend.  Will refer to outpatient therapy at [facility] for vestibular therapy.  Okay to continue meclizine 3 times daily as needed.

## 2025-01-07 NOTE — Clinical Note
NO DR OD, but OS nopt centered so Unable to assess photos.  Photos should be retaken, or next avail eye exam scheduled

## 2025-01-31 ENCOUNTER — HOSPITAL ENCOUNTER (OUTPATIENT)
Facility: HOSPITAL | Age: 80
Discharge: HOME | End: 2025-01-31
Attending: ORTHOPAEDIC SURGERY | Admitting: ORTHOPAEDIC SURGERY
Payer: MEDICARE

## 2025-01-31 VITALS
DIASTOLIC BLOOD PRESSURE: 74 MMHG | BODY MASS INDEX: 42.66 KG/M2 | RESPIRATION RATE: 20 BRPM | WEIGHT: 315 LBS | TEMPERATURE: 98 F | SYSTOLIC BLOOD PRESSURE: 145 MMHG | HEART RATE: 84 BPM | OXYGEN SATURATION: 96 % | HEIGHT: 72 IN

## 2025-01-31 DIAGNOSIS — M17.0 BILATERAL PRIMARY OSTEOARTHRITIS OF KNEE: Primary | ICD-10-CM

## 2025-01-31 LAB — POCT GLUCOSE: 130 MG/DL (ref 70–110)

## 2025-01-31 PROCEDURE — 64624 DSTRJ NULYT AGT GNCLR NRV: CPT | Performed by: ORTHOPAEDIC SURGERY

## 2025-01-31 PROCEDURE — 27201689 HC IOVERA SMART TIP/CARTRIDGE: Performed by: ORTHOPAEDIC SURGERY

## 2025-01-31 PROCEDURE — C2618 PROBE/NEEDLE, CRYO: HCPCS | Performed by: ORTHOPAEDIC SURGERY

## 2025-01-31 PROCEDURE — 64640 INJECTION TREATMENT OF NERVE: CPT | Mod: 50 | Performed by: ORTHOPAEDIC SURGERY

## 2025-01-31 PROCEDURE — 64640 INJECTION TREATMENT OF NERVE: CPT | Mod: 50,,, | Performed by: ORTHOPAEDIC SURGERY

## 2025-01-31 PROCEDURE — 82962 GLUCOSE BLOOD TEST: CPT | Performed by: ORTHOPAEDIC SURGERY

## 2025-01-31 RX ORDER — LIDOCAINE HYDROCHLORIDE 10 MG/ML
30 INJECTION, SOLUTION EPIDURAL; INFILTRATION; INTRACAUDAL; PERINEURAL ONCE
Status: DISCONTINUED | OUTPATIENT
Start: 2025-01-31 | End: 2025-01-31 | Stop reason: HOSPADM

## 2025-01-31 NOTE — OP NOTE
Procedure Note      Patient: Chris Mendoza II   : 1945    MRN: [id number]  Date: 2025      Surgeon:Jose Luna MD  Assistant: Little Contreras NP  Assistant was essential in patient positioning, administering local anesthetic, and cryoablation of nerves  Preoperative Diagnosis:  Bilateral primary osteoarthrtiis  Postoperative Diagnosis: Same  Procedure:  Cryoablation of bilateral anterior femoral cutaneous nerve, lateral femoral cutaneous nerve, superior and inferior infrapatellar saphenous nerve  EBL: minimal  Complications: None    Indications: Chris Mendoza II is a 79 y.o. male presenting with knee pain and osteoarthritis.  After thorough discussion of the risks, benefits, expected outcomes, and alternatives to intervention, the patient agreed to proceed with Iovera.  Specific risks discussed included, but were not limited to: superficial or deep infection, wound healing complications, DVT/PE, significant bleeding requiring transfusion, damage to named anatomic structures in the immediate area including named neurovascular structures, infection, nonunion, malunion and general risks of anesthesia.  The patient voiced understanding and written as well as consent was obtained prior to the procedure.    Procedure Note:      Patient placed in supine position.  Right Leg was laid straight and flat.  Anterior femoral cutaneous nerve, lateral femoral cutaneous nerve, and the inferior saphenous nerve were identified using anatomical landmarks.  Center of the patella was marked and one third of the distance was measured from the patella to the inguinal crease.  Line was drawn the with the patella from this measurement marking the anterior femoral cutaneous nerve and lateral femoral cutaneous nerve.  We then marked the lower pole of the patella and measured 5 cm medial to this.  5 cm medial was then measured and marked in the tibial tubercle.  These lines were connected marking the superior and inferior  infrapatellar saphenous nerve.  Performed the same measurements on the left leg.     At this time a time-out was performed, with the correct patient, site, and procedure identified.  The universal time out protocols were followed.      Patient's skin was cleaned and sterilized over all four treatment areas.  It was then anesthetized using skin wheal of 1% lidocaine totaling 15 cc.    We began on the right knee.  After appropriate anesthetic was administered, the smart tip cryoneurolysis needle was inserted into the proximal treatment area for the anterior femoral cutaneous nerve and lateral femoral cutaneous nerve.  The treatment duration was 60 seconds per cycle.  At the termination of each treatment cycle, the needle was removed.  This was repeated across the entire premarked treatment area.  At the termination treatment cryolysis needle was removed.  The patient skin was cleansed and covered with a bandage.  Patient tolerated the procedure very well and had decrease in pain on his anterior knee.    We then moved on to the distal procedure in a separate position for the superior and inferior infrapatellar saphenous nerve.  After appropriate anesthetic was administered, the smart tip cryoneurolysis needle was inserted into the distal treatment area for the inferior saphenous nerve.  The treatment duration was 60 seconds per cycle.  At the termination of each treatment cycle, the needle was removed.  This was repeated across the entire premarked treatment area.  At the termination treatment cryolysis needle was removed.  The patient skin was cleansed and covered with a bandage.      WE then moved on to the left leg.  After appropriate anesthetic was administered, the smart tip cryoneurolysis needle was inserted into the proximal treatment area for the anterior femoral cutaneous nerve and lateral femoral cutaneous nerve.  The treatment duration was 60 seconds per cycle.  At the termination of each treatment cycle,  the needle was removed.  This was repeated across the entire premarked treatment area.  At the termination treatment cryolysis needle was removed.  The patient skin was cleansed and covered with a bandage.  Patient tolerated the procedure very well and had decrease in pain on his anterior knee.    We then moved on to the distal procedure in a separate position for the superior and inferior infrapatellar saphenous nerve.  After appropriate anesthetic was administered, the smart tip cryoneurolysis needle was inserted into the distal treatment area for the inferior saphenous nerve.  The treatment duration was 60 seconds per cycle.  At the termination of each treatment cycle, the needle was removed.  This was repeated across the entire premarked treatment area.  At the termination treatment cryolysis needle was removed.  The patient skin was cleansed and covered with a bandage.    Patient tolerated the procedure very well and had decrease in pain to the knee on discharge.  Patient was able to ambulate from the office and tolerated this very well..      This note/OR report was created with the assistance of  voice recognition software or phone  dictation.  There may be transcription errors as a result of using this technology however minimal. Effort has been made to assure accuracy of transcription but any obvious errors or omissions should be clarified with the author of the document.

## 2025-01-31 NOTE — PLAN OF CARE
Preparing for discharge. Vss. Inst reviewed. Ice packs given. Discharged with ACE bandage x2 for home use if needed.

## 2025-01-31 NOTE — DISCHARGE SUMMARY
[facility]  Discharge Note  Short Stay    Procedure(s) (LRB):  Cryotherapy, Nerve, Peripheral, Percutaneous, using liquid Nitrous Oxide in closed needle device (Bilateral)      OUTCOME: Patient tolerated treatment/procedure well without complication and is now ready for discharge.    DISPOSITION: Home or Self Care    FINAL DIAGNOSIS:  <principal problem not specified>    FOLLOWUP: In clinic    DISCHARGE INSTRUCTIONS:  No discharge procedures on file.      Clinical Reference Documents Added to Patient Instructions         Document    RADIOFREQUENCY ABLATION (ENGLISH)            TIME SPENT ON DISCHARGE: 5 minutes

## 2025-03-25 DIAGNOSIS — N40.0 BENIGN PROSTATIC HYPERPLASIA, UNSPECIFIED WHETHER LOWER URINARY TRACT SYMPTOMS PRESENT: ICD-10-CM

## 2025-03-25 RX ORDER — DOXAZOSIN 4 MG/1
4 TABLET ORAL
Qty: 90 TABLET | Refills: 2 | Status: SHIPPED | OUTPATIENT
Start: 2025-03-25

## 2025-04-23 DIAGNOSIS — N40.0 BENIGN PROSTATIC HYPERPLASIA, UNSPECIFIED WHETHER LOWER URINARY TRACT SYMPTOMS PRESENT: ICD-10-CM

## 2025-04-23 RX ORDER — FINASTERIDE 5 MG/1
5 TABLET, FILM COATED ORAL
Qty: 90 TABLET | Refills: 0 | Status: SHIPPED | OUTPATIENT
Start: 2025-04-23

## 2025-06-17 ENCOUNTER — TELEPHONE (OUTPATIENT)
Dept: ORTHOPEDICS | Facility: CLINIC | Age: 80
End: 2025-06-17
Payer: MEDICARE

## 2025-06-17 NOTE — TELEPHONE ENCOUNTER
Pt requested to reschedule his appt on 6/19 due to an illness. I called and spoke with the pt. We decided to r/s it to 7/10/25 @3:15PM. Pt voiced clear understanding.

## 2025-06-19 ENCOUNTER — TELEPHONE (OUTPATIENT)
Dept: FAMILY MEDICINE | Facility: CLINIC | Age: 80
End: 2025-06-19
Payer: MEDICARE

## 2025-06-19 RX ORDER — PROMETHAZINE HYDROCHLORIDE AND DEXTROMETHORPHAN HYDROBROMIDE 6.25; 15 MG/5ML; MG/5ML
5 SYRUP ORAL EVERY 4 HOURS PRN
Qty: 200 ML | Refills: 0 | Status: SHIPPED | OUTPATIENT
Start: 2025-06-19 | End: 2025-06-29

## 2025-06-19 NOTE — TELEPHONE ENCOUNTER
Patient has complaints of cough since dx with covid. He is asking for a cough syrup to be sent to [facility] in [place].

## 2025-06-26 ENCOUNTER — TELEPHONE (OUTPATIENT)
Dept: ORTHOPEDICS | Facility: CLINIC | Age: 80
End: 2025-06-26
Payer: MEDICARE

## 2025-06-26 NOTE — TELEPHONE ENCOUNTER
Patient called and lvm stating he has an appoinment on 7/10 but is requesting a sooner appointment and pain medication.     Informed patient that we do not currently have any sooner appointments available and cannot prescribe pain medication unless there has been a surgery in the last 3 months. Patient became upset and hung up.

## 2025-07-10 ENCOUNTER — HOSPITAL ENCOUNTER (OUTPATIENT)
Dept: RADIOLOGY | Facility: CLINIC | Age: 80
Discharge: HOME | End: 2025-07-10
Attending: ORTHOPAEDIC SURGERY
Payer: MEDICARE

## 2025-07-10 ENCOUNTER — OFFICE VISIT (OUTPATIENT)
Dept: ORTHOPEDICS | Facility: CLINIC | Age: 80
End: 2025-07-10
Payer: MEDICARE

## 2025-07-10 VITALS — HEIGHT: 72 IN | BODY MASS INDEX: 46.05 KG/M2

## 2025-07-10 DIAGNOSIS — M17.11 PRIMARY OSTEOARTHRITIS OF RIGHT KNEE: ICD-10-CM

## 2025-07-10 DIAGNOSIS — M17.12 PRIMARY OSTEOARTHRITIS OF LEFT KNEE: ICD-10-CM

## 2025-07-10 DIAGNOSIS — Z96.641 S/P HIP REPLACEMENT, RIGHT: Primary | ICD-10-CM

## 2025-07-10 DIAGNOSIS — T84.018A FAILED TOTAL HIP ARTHROPLASTY, INITIAL ENCOUNTER: ICD-10-CM

## 2025-07-10 DIAGNOSIS — Z96.649 FAILED TOTAL HIP ARTHROPLASTY, INITIAL ENCOUNTER: ICD-10-CM

## 2025-07-10 DIAGNOSIS — Z96.641 S/P HIP REPLACEMENT, RIGHT: ICD-10-CM

## 2025-07-10 PROCEDURE — 73502 X-RAY EXAM HIP UNI 2-3 VIEWS: CPT | Mod: RT,,, | Performed by: ORTHOPAEDIC SURGERY

## 2025-07-10 PROCEDURE — 73562 X-RAY EXAM OF KNEE 3: CPT | Mod: 50,,, | Performed by: ORTHOPAEDIC SURGERY

## 2025-07-10 RX ADMIN — BETAMETHASONE SODIUM PHOSPHATE AND BETAMETHASONE ACETATE 12 MG: 3; 3 INJECTION, SUSPENSION INTRA-ARTICULAR; INTRALESIONAL; INTRAMUSCULAR; SOFT TISSUE at 03:07

## 2025-07-10 RX ADMIN — LIDOCAINE HYDROCHLORIDE 5 ML: 20 INJECTION, SOLUTION EPIDURAL; INFILTRATION; INTRACAUDAL; PERINEURAL at 03:07

## 2025-07-10 NOTE — PROCEDURES
Large Joint Aspiration/Injection: bilateral knee    Date/Time: 7/10/2025 3:15 PM    Performed by: Jose Luna MD  Authorized by: Jose Luna MD    Consent Done?:  Yes (Verbal)  Indications:  Arthritis, joint swelling and pain  Timeout: prior to procedure the correct patient, procedure, and site was verified    Prep: patient was prepped and draped in usual sterile fashion      Details:  Needle Size:  22 G  Approach:  Anterolateral  Location:  Knee  Laterality:  Bilateral  Site:  Bilateral knee  Medications (Right):  5 mL LIDOcaine (PF) 20 mg/mL (2%) 20 mg/mL (2 %); 12 mg betamethasone acetate-betamethasone sodium phosphate 6 mg/mL  Medications (Left):  5 mL LIDOcaine (PF) 20 mg/mL (2%) 20 mg/mL (2 %); 12 mg betamethasone acetate-betamethasone sodium phosphate 6 mg/mL  Patient tolerance:  Patient tolerated the procedure well with no immediate complications

## 2025-07-11 ENCOUNTER — LAB VISIT (OUTPATIENT)
Dept: LAB | Facility: HOSPITAL | Age: 80
End: 2025-07-11
Attending: ORTHOPAEDIC SURGERY
Payer: MEDICARE

## 2025-07-11 DIAGNOSIS — Z96.649 FAILED TOTAL HIP ARTHROPLASTY, INITIAL ENCOUNTER: ICD-10-CM

## 2025-07-11 DIAGNOSIS — T84.018A FAILED TOTAL HIP ARTHROPLASTY, INITIAL ENCOUNTER: ICD-10-CM

## 2025-07-11 LAB
BASOPHILS # BLD AUTO: 0 X10(3)/MCL
BASOPHILS NFR BLD AUTO: 0 %
CRP SERPL HS-MCNC: 39.36 MG/L
EOSINOPHIL # BLD AUTO: 0 X10(3)/MCL (ref 0–0.9)
EOSINOPHIL NFR BLD AUTO: 0 %
ERYTHROCYTE [DISTWIDTH] IN BLOOD BY AUTOMATED COUNT: 15.2 % (ref 11.5–17)
ERYTHROCYTE [SEDIMENTATION RATE] IN BLOOD: 27 MM/HR (ref 0–15)
HCT VFR BLD AUTO: 43.9 % (ref 42–52)
HGB BLD-MCNC: 13.6 G/DL (ref 14–18)
IMM GRANULOCYTES # BLD AUTO: 0.04 X10(3)/MCL (ref 0–0.04)
IMM GRANULOCYTES NFR BLD AUTO: 0.4 %
LYMPHOCYTES # BLD AUTO: 0.62 X10(3)/MCL (ref 0.6–4.6)
LYMPHOCYTES NFR BLD AUTO: 5.5 %
MCH RBC QN AUTO: 27.5 PG (ref 27–31)
MCHC RBC AUTO-ENTMCNC: 31 G/DL (ref 33–36)
MCV RBC AUTO: 88.7 FL (ref 80–94)
MONOCYTES # BLD AUTO: 0.13 X10(3)/MCL (ref 0.1–1.3)
MONOCYTES NFR BLD AUTO: 1.2 %
NEUTROPHILS # BLD AUTO: 10.47 X10(3)/MCL (ref 2.1–9.2)
NEUTROPHILS NFR BLD AUTO: 92.9 %
PLATELET # BLD AUTO: 266 X10(3)/MCL (ref 130–400)
PMV BLD AUTO: 9.8 FL (ref 7.4–10.4)
RBC # BLD AUTO: 4.95 X10(6)/MCL (ref 4.7–6.1)
WBC # BLD AUTO: 11.26 X10(3)/MCL (ref 4.5–11.5)

## 2025-07-11 PROCEDURE — 86141 C-REACTIVE PROTEIN HS: CPT

## 2025-07-11 PROCEDURE — 85652 RBC SED RATE AUTOMATED: CPT

## 2025-07-11 PROCEDURE — 85025 COMPLETE CBC W/AUTO DIFF WBC: CPT

## 2025-07-11 PROCEDURE — 36415 COLL VENOUS BLD VENIPUNCTURE: CPT

## 2025-07-11 RX ORDER — BETAMETHASONE SODIUM PHOSPHATE AND BETAMETHASONE ACETATE 3; 3 MG/ML; MG/ML
12 INJECTION, SUSPENSION INTRA-ARTICULAR; INTRALESIONAL; INTRAMUSCULAR; SOFT TISSUE
Status: DISCONTINUED | OUTPATIENT
Start: 2025-07-10 | End: 2025-07-11 | Stop reason: HOSPADM

## 2025-07-11 RX ORDER — LIDOCAINE HYDROCHLORIDE 20 MG/ML
5 INJECTION, SOLUTION EPIDURAL; INFILTRATION; INTRACAUDAL; PERINEURAL
Status: DISCONTINUED | OUTPATIENT
Start: 2025-07-10 | End: 2025-07-11 | Stop reason: HOSPADM

## 2025-07-11 NOTE — PROGRESS NOTES
Chief Complaint:   Chief Complaint   Patient presents with    Hip Pain     Rt Hip Pain patients states it feels like a shocking pain that's constant when moving anyway. Nothing helps relief pain and he tried different things.     Knee Pain     KASSIDY knee pain patient states he has pain in his knees as well. He would like to get injections if he can. He also asked about iovera injection for his knees       History of present illness:  History of Present Illness    CHIEF COMPLAINT:  - Left hip pain    HPI:  Chris presents with worsening left hip pain that started about three weeks ago. Pain is localized to the groin and thigh area, described as sharp and shocking, particularly when turning in a chair or lying down at night with legs straight. He reports swelling on the top part of the hip. Previously, he was able to walk with a cane and go out to the yard, but now reports significant pain limiting these activities.    An antibiotic spacer was placed in his hip approximately two years ago. He expresses concern about potential infection recurrence, given his history of previous infections.    He also reports knee pain, particularly in one area, describing it as severe. He mentions having had an Iovera treatment in the past, which left part of his leg numb. He states that a portion of his leg remains numb, while other areas never became numb from the treatment.    PREVIOUS TREATMENTS:  - Chris had knee injections in the past, including:  - Cortisone shots  - Hyaluronic acid injections  - Zortada (slow release steroid injection)  - Iovera (freezing treatment): Provided some benefit, he reports part of leg is still numb  - He uses a cane for walking    SURGICAL HISTORY:  - Hip surgery with antibiotic spacer placement: Approximately 2 years ago  - Repeat antibiotic spacer placement for hip: Approximately 2 years ago      ROS:  Musculoskeletal: +joint pain, +joint swelling, +limb pain, +body aches  Neurological:  +numbness          Past Medical History:   Diagnosis Date    Arthritis     Atrial fibrillation     Bladder cancer     CKD (chronic kidney disease)     Diabetes mellitus, type 2     Gout, unspecified     Hyperlipidemia     Hypertension     Restless leg syndrome     Seasonal allergies     Sleep apnea, unspecified     uses cpap       Past Surgical History:   Procedure Laterality Date    ARTHROGRAM Right 02/15/2024    Procedure: ARTHROGRAM  aspiration right hip with anesthesia #1;  Surgeon: Jose Luna MD;  Location: [facility] OR;  Service: Orthopedics;  Laterality: Right;  aspiration right hip with anesthesia    BLADDER SURGERY  2009    removed Ca    CARDIAC CATHETERIZATION  2022    CARDIOVERSION      CATARACT EXTRACTION W/  INTRAOCULAR LENS IMPLANT Bilateral     MULTIPLE TOOTH EXTRACTIONS      PERCUTANEOUS CRYOTHERAPY OF PERIPHERAL NERVE USING LIQUID NITROUS OXIDE IN CLOSED NEEDLE DEVICE Bilateral 1/31/2025    Procedure: Cryotherapy, Nerve, Peripheral, Percutaneous, using liquid Nitrous Oxide in closed needle device;  Surgeon: Jose Luna MD;  Location: [facility] OR;  Service: Orthopedics;  Laterality: Bilateral;    REVISION TOTAL HIP ARTHROPLASTY Right 04/08/2024    Procedure: REVISION, TOTAL ARTHROPLASTY, HIP - revision R COLLIN with abx spacer;  Surgeon: Jose Luna MD;  Location: [facility] OR;  Service: Orthopedics;  Laterality: Right;  revision R COLLIN with abx spacer    VEIN SURGERY         Current Outpatient Medications   Medication Sig    allopurinoL (ZYLOPRIM) 300 MG tablet Take 1 tablet (300 mg total) by mouth once daily.    doxazosin (CARDURA) 4 MG tablet Take 1 tablet by mouth once daily    ELIQUIS 5 mg Tab Take 5 mg by mouth 2 (two) times daily.    finasteride (PROSCAR) 5 mg tablet Take 1 tablet by mouth once daily    loratadine (CLARITIN) 10 mg tablet Take 10 mg by mouth daily as needed.    meclizine (ANTIVERT) 25 mg tablet Take 1 tablet (25 mg total) by mouth 3 (three) times daily as needed for Dizziness.     rosuvastatin (CRESTOR) 20 MG tablet Take 20 mg by mouth every evening.    semaglutide (OZEMPIC) 1 mg/dose (4 mg/3 mL) Inject 1 mg into the skin every 7 days.     No current facility-administered medications for this visit.       Review of patient's allergies indicates:   Allergen Reactions    Clindamycin Rash    Levofloxacin Hives       Family History   Problem Relation Name Age of Onset    Coronary artery disease Mother      Cancer Father Chris coleman sr.     Heart disease Father Chris noer sr.     Thyroid disease Sister         Social History[1]        Review of Systems:    Constitution: Negative for chills, fever, and sweats.  Negative for unexplained weight loss.    HENT:  Negative for headaches and blurry vision.    Cardiovascular:Negative for chest pain or irregular heart beat. Negative for hypertension.    Respiratory:  Negative for cough and shortness of breath.    Gastrointestinal: Negative for abdominal pain, heartburn, melena, nausea, and vomitting.    Genitourinary:  Negative bladder incontinence and dysuria.    Musculoskeletal:  See HPI    Neurological: Negative for numbness.    Psychiatric/Behavioral: Negative for depression.  The patient is not nervous/anxious.      Endocrine: Negative for polyuria    Hematologic/Lymphatic: Negative for bleeding problem.  Does not bruise/bleed easily.    Skin: Negative for poor would healing and rash      Physical Examination:    Vital Signs:  There were no vitals filed for this visit.    Body mass index is 46.05 kg/m².    General: No acute distress, alert and oriented, healthy appearing    HEENT: Head is atraumatic, mucous membranes are moist    Neck: Supples, no JVD    Cardiovascular: Palpable dorsalis pedis and posterior tibial pulses, regular rate and rhythm to those pulses    Lungs: Breathing non-labored    Skin: no rashes appreciated    Neurologic: Can flex and extend knees, ankles, and toes. Sensation is grossly intact    Range of motion of the right hip  with significant discomfort and pain.  Full range of motion not checked given significant pain.  Incision has some swelling although it is not significantly erythematous.    Bilateral knees: Varus alignment of both knees.  Range of motion unchanged roughly 5-95 bilaterally.    X-rays:  Views right hip reviewed today.  Patient's implants appear fixed although no signs of subsidence noted.  Four views of bilateral knees reviewed today.  Patient has end-stage osteoarthritis in his right as well as left knee.  It had unchanged from previous.     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  We will work him up with blood work and likely an aspiration.  With regards to his knees, we will do a steroid injections today.  IO vera it had worked for her in the past although we are not offering this currently.    This note was generated with the assistance of ambient listening technology. Verbal consent was obtained by the patient and accompanying visitor(s) for the recording of patient appointment to facilitate this note. I attest to having reviewed and edited the generated note for accuracy, though some syntax or spelling errors may persist. Please contact the author of this note for any clarification.       This note was created using [employer] voice recognition software that occasionally misinterpreted phrases or words.    Consult note is delivered via Epic messaging service.         [1]   Social History  Socioeconomic History    Marital status:    Tobacco Use    Smoking status: Never    Smokeless tobacco: Never   Substance and Sexual Activity    Alcohol use: Not Currently    Drug use: Not Currently     Types: Marijuana    Sexual activity: Yes     Partners: Female

## 2025-07-14 ENCOUNTER — TELEPHONE (OUTPATIENT)
Dept: ORTHOPEDICS | Facility: CLINIC | Age: 80
End: 2025-07-14
Payer: MEDICARE

## 2025-07-14 NOTE — TELEPHONE ENCOUNTER
"Patient called stating that he is having extreme pain in his hip and is requesting pain medication. Informed him that I would put in a message to Dr. Luna for further recommendations.   Patient stated that he needs something for pain right now. Informed patient that if the pain was that bad he could present to the ED.    Patient stated "yall aren't much help" and hung up.   "

## 2025-07-15 DIAGNOSIS — T84.018A FAILED TOTAL HIP ARTHROPLASTY, INITIAL ENCOUNTER: Primary | ICD-10-CM

## 2025-07-15 DIAGNOSIS — Z96.649 FAILED TOTAL HIP ARTHROPLASTY, INITIAL ENCOUNTER: Primary | ICD-10-CM

## 2025-07-15 RX ORDER — HYDROCODONE BITARTRATE AND ACETAMINOPHEN 5; 325 MG/1; MG/1
1 TABLET ORAL EVERY 6 HOURS PRN
Qty: 28 TABLET | Refills: 0 | Status: SHIPPED | OUTPATIENT
Start: 2025-07-15

## 2025-07-15 NOTE — TELEPHONE ENCOUNTER
Contacted patient to inform him of what is stated below by Dr. Luna. Patient voiced a clear understanding.

## 2025-07-24 DIAGNOSIS — N40.0 BENIGN PROSTATIC HYPERPLASIA, UNSPECIFIED WHETHER LOWER URINARY TRACT SYMPTOMS PRESENT: ICD-10-CM

## 2025-07-24 RX ORDER — FINASTERIDE 5 MG/1
5 TABLET, FILM COATED ORAL
Qty: 90 TABLET | Refills: 0 | Status: SHIPPED | OUTPATIENT
Start: 2025-07-24

## 2025-08-07 ENCOUNTER — OFFICE VISIT (OUTPATIENT)
Dept: ORTHOPEDICS | Facility: CLINIC | Age: 80
End: 2025-08-07
Payer: MEDICARE

## 2025-08-07 VITALS
BODY MASS INDEX: 42.66 KG/M2 | DIASTOLIC BLOOD PRESSURE: 65 MMHG | HEART RATE: 87 BPM | HEIGHT: 72 IN | SYSTOLIC BLOOD PRESSURE: 109 MMHG | WEIGHT: 315 LBS

## 2025-08-07 DIAGNOSIS — T84.018A FAILED TOTAL HIP ARTHROPLASTY, INITIAL ENCOUNTER: Primary | ICD-10-CM

## 2025-08-07 DIAGNOSIS — Z96.649 FAILED TOTAL HIP ARTHROPLASTY, INITIAL ENCOUNTER: Primary | ICD-10-CM

## 2025-08-08 ENCOUNTER — ANESTHESIA EVENT (OUTPATIENT)
Dept: SURGERY | Facility: HOSPITAL | Age: 80
End: 2025-08-08
Payer: MEDICARE

## 2025-08-08 DIAGNOSIS — T84.018A FAILED TOTAL HIP ARTHROPLASTY, INITIAL ENCOUNTER: Primary | ICD-10-CM

## 2025-08-08 DIAGNOSIS — Z96.649 FAILED TOTAL HIP ARTHROPLASTY, INITIAL ENCOUNTER: Primary | ICD-10-CM

## 2025-08-08 RX ORDER — SODIUM CHLORIDE 9 MG/ML
INJECTION, SOLUTION INTRAVENOUS CONTINUOUS
OUTPATIENT
Start: 2025-08-08

## 2025-08-08 RX ORDER — MUPIROCIN 20 MG/G
OINTMENT TOPICAL
OUTPATIENT
Start: 2025-08-08

## 2025-08-08 NOTE — PROGRESS NOTES
Chief Complaint:   Chief Complaint   Patient presents with    Results     *lab results*Rt hip pain, delmi knee       History of present illness:  History of Present Illness    CHIEF COMPLAINT:  - Right hip pain and large mass on the side of the hip    HPI:  Chris presents with severe right hip pain, describing it as extremely intense. Pain is exacerbated by weight-bearing, causing significant mobility limitations. He reports a large, hard mass approximately the size of a cantaloupe on the side of his hip, which is painful to touch.    His condition has rapidly deteriorated, progressing from manageable to severe. He now requires a walker for ambulation, taking a step and then dragging the affected leg. He is unable to put weight on the affected hip and struggles with daily activities, including personal grooming.    His medical history includes multiple hip surgeries, with a temporary implant lasting seven years, followed by another for two years. He also mentions a cement spacer placement, which he suspects may be loosening. He has undergone dental surgery in the past.    Previous treatments include antibiotic infusions and oral antibiotics following his surgeries. His history suggests a recurrence of a previous hip infection.    He emphasizes the severity of his condition, expressing significant distress due to the pain and functional limitations.    He denies any formal medical diagnoses.    PREVIOUS TREATMENTS:  - Infusion antibiotics after the most recent surgery  - Oral antibiotics for a period after the last surgery    MEDICATIONS:  - Oral antibiotics: after the last surgery  - Infusion antibiotics: after the most recent surgery    SURGICAL HISTORY:  - Hip replacement with cement spacer  - Hip infection requiring removal and cleaning, followed by placement of antibiotic spacer  - Temporary spacer: 7 years  - Second temporary spacer: 2 years  - Dental surgery      ROS:  Musculoskeletal: +limb pain, +bony  mass, +pain with movement, +difficulty walking, +difficulty standing up, +lumps/masses, +limited movement          Past Medical History:   Diagnosis Date    Arthritis     Atrial fibrillation     Bladder cancer     CKD (chronic kidney disease)     Diabetes mellitus, type 2     Gout, unspecified     Hyperlipidemia     Hypertension     Restless leg syndrome     Seasonal allergies     Sleep apnea, unspecified     uses cpap       Past Surgical History:   Procedure Laterality Date    ARTHROGRAM Right 02/15/2024    Procedure: ARTHROGRAM  aspiration right hip with anesthesia #1;  Surgeon: Jose Luna MD;  Location: [facility] OR;  Service: Orthopedics;  Laterality: Right;  aspiration right hip with anesthesia    BLADDER SURGERY  2009    removed Ca    CARDIAC CATHETERIZATION  2022    CARDIOVERSION      CATARACT EXTRACTION W/  INTRAOCULAR LENS IMPLANT Bilateral     MULTIPLE TOOTH EXTRACTIONS      PERCUTANEOUS CRYOTHERAPY OF PERIPHERAL NERVE USING LIQUID NITROUS OXIDE IN CLOSED NEEDLE DEVICE Bilateral 1/31/2025    Procedure: Cryotherapy, Nerve, Peripheral, Percutaneous, using liquid Nitrous Oxide in closed needle device;  Surgeon: Jose Luna MD;  Location: [facility] OR;  Service: Orthopedics;  Laterality: Bilateral;    REVISION TOTAL HIP ARTHROPLASTY Right 04/08/2024    Procedure: REVISION, TOTAL ARTHROPLASTY, HIP - revision R COLLIN with abx spacer;  Surgeon: Jose Luna MD;  Location: [facility] OR;  Service: Orthopedics;  Laterality: Right;  revision R COLLIN with abx spacer    VEIN SURGERY         Current Outpatient Medications   Medication Sig    allopurinoL (ZYLOPRIM) 300 MG tablet Take 1 tablet (300 mg total) by mouth once daily.    doxazosin (CARDURA) 4 MG tablet Take 1 tablet by mouth once daily    ELIQUIS 5 mg Tab Take 5 mg by mouth 2 (two) times daily.    finasteride (PROSCAR) 5 mg tablet Take 1 tablet by mouth once daily    HYDROcodone-acetaminophen (NORCO) 5-325 mg per tablet Take 1 tablet by mouth every 6 (six) hours  as needed.    loratadine (CLARITIN) 10 mg tablet Take 10 mg by mouth daily as needed.    meclizine (ANTIVERT) 25 mg tablet Take 1 tablet (25 mg total) by mouth 3 (three) times daily as needed for Dizziness.    rosuvastatin (CRESTOR) 20 MG tablet Take 20 mg by mouth every evening.    semaglutide (OZEMPIC) 1 mg/dose (4 mg/3 mL) Inject 1 mg into the skin every 7 days.     No current facility-administered medications for this visit.       Review of patient's allergies indicates:   Allergen Reactions    Clindamycin Rash    Levofloxacin Hives       Family History   Problem Relation Name Age of Onset    Coronary artery disease Mother      Cancer Father Perez coleman sr.     Heart disease Father Perez coleman sr.     Thyroid disease Sister         Social History[1]        Review of Systems:    Constitution: Negative for chills, fever, and sweats.  Negative for unexplained weight loss.    HENT:  Negative for headaches and blurry vision.    Cardiovascular:Negative for chest pain or irregular heart beat. Negative for hypertension.    Respiratory:  Negative for cough and shortness of breath.    Gastrointestinal: Negative for abdominal pain, heartburn, melena, nausea, and vomitting.    Genitourinary:  Negative bladder incontinence and dysuria.    Musculoskeletal:  See HPI    Neurological: Negative for numbness.    Psychiatric/Behavioral: Negative for depression.  The patient is not nervous/anxious.      Endocrine: Negative for polyuria    Hematologic/Lymphatic: Negative for bleeding problem.  Does not bruise/bleed easily.    Skin: Negative for poor would healing and rash      Physical Examination:    Vital Signs:    Vitals:    08/07/25 1540   BP: 109/65   Pulse: 87       Body mass index is 46.05 kg/m².    General: No acute distress, alert and oriented, healthy appearing    HEENT: Head is atraumatic, mucous membranes are moist    Neck: Supples, no JVD    Cardiovascular: Palpable dorsalis pedis and posterior tibial pulses, regular  rate and rhythm to those pulses    Lungs: Breathing non-labored    Skin: no rashes appreciated    Neurologic: Can flex and extend knees, ankles, and toes. Sensation is grossly intact    Right hip:  Any range of motion right hip with significant discomfort and pain.  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  We would do an aspiration to rule out recurrence of the infection that has a significant concern.    This note was generated with the assistance of ambient listening technology. Verbal consent was obtained by the patient and accompanying visitor(s) for the recording of patient appointment to facilitate this note. I attest to having reviewed and edited the generated note for accuracy, though some syntax or spelling errors may persist. Please contact the author of this note for any clarification.       This note was created using [employer] voice recognition software that occasionally misinterpreted phrases or words.    Consult note is delivered via Epic messaging service.         [1]   Social History  Socioeconomic History    Marital status:    Tobacco Use    Smoking status: Never    Smokeless tobacco: Never   Substance and Sexual Activity    Alcohol use: Not Currently    Drug use: Not Currently     Types: Marijuana    Sexual activity: Yes     Partners: Female

## 2025-08-10 RX ORDER — GLUCAGON 1 MG
1 KIT INJECTION
OUTPATIENT
Start: 2025-08-10

## 2025-08-10 RX ORDER — ONDANSETRON HYDROCHLORIDE 2 MG/ML
4 INJECTION, SOLUTION INTRAVENOUS ONCE AS NEEDED
OUTPATIENT
Start: 2025-08-10 | End: 2037-01-06

## 2025-08-11 ENCOUNTER — ANESTHESIA (OUTPATIENT)
Dept: SURGERY | Facility: HOSPITAL | Age: 80
End: 2025-08-11
Payer: MEDICARE

## 2025-08-11 ENCOUNTER — HOSPITAL ENCOUNTER (OUTPATIENT)
Facility: HOSPITAL | Age: 80
Discharge: HOME | End: 2025-08-11
Attending: ORTHOPAEDIC SURGERY | Admitting: ORTHOPAEDIC SURGERY
Payer: MEDICARE

## 2025-08-11 DIAGNOSIS — T84.018A FAILED TOTAL HIP ARTHROPLASTY, INITIAL ENCOUNTER: ICD-10-CM

## 2025-08-11 DIAGNOSIS — Z96.649 FAILED TOTAL HIP ARTHROPLASTY, INITIAL ENCOUNTER: ICD-10-CM

## 2025-08-11 LAB
GRAM STN SPEC: NORMAL
GRAM STN SPEC: NORMAL
POCT GLUCOSE: 140 MG/DL (ref 70–110)

## 2025-08-11 PROCEDURE — 37000008 HC ANESTHESIA 1ST 15 MINUTES: Performed by: ORTHOPAEDIC SURGERY

## 2025-08-11 PROCEDURE — 20610 DRAIN/INJ JOINT/BURSA W/O US: CPT | Performed by: ORTHOPAEDIC SURGERY

## 2025-08-11 PROCEDURE — 87205 SMEAR GRAM STAIN: CPT | Performed by: ORTHOPAEDIC SURGERY

## 2025-08-11 PROCEDURE — 71000015 HC POSTOP RECOV 1ST HR: Performed by: ORTHOPAEDIC SURGERY

## 2025-08-11 PROCEDURE — 87070 CULTURE OTHR SPECIMN AEROBIC: CPT | Performed by: ORTHOPAEDIC SURGERY

## 2025-08-11 PROCEDURE — 63600175 PHARM REV CODE 636 W HCPCS: Performed by: ORTHOPAEDIC SURGERY

## 2025-08-11 PROCEDURE — 25000003 PHARM REV CODE 250: Performed by: NURSE ANESTHETIST, CERTIFIED REGISTERED

## 2025-08-11 PROCEDURE — 87075 CULTR BACTERIA EXCEPT BLOOD: CPT | Performed by: ORTHOPAEDIC SURGERY

## 2025-08-11 PROCEDURE — 36000704 HC OR TIME LEV I 1ST 15 MIN: Performed by: ORTHOPAEDIC SURGERY

## 2025-08-11 PROCEDURE — 82962 GLUCOSE BLOOD TEST: CPT | Performed by: ORTHOPAEDIC SURGERY

## 2025-08-11 PROCEDURE — 63600175 PHARM REV CODE 636 W HCPCS: Performed by: NURSE ANESTHETIST, CERTIFIED REGISTERED

## 2025-08-11 PROCEDURE — 87102 FUNGUS ISOLATION CULTURE: CPT | Performed by: ORTHOPAEDIC SURGERY

## 2025-08-11 PROCEDURE — 71000016 HC POSTOP RECOV ADDL HR: Performed by: ORTHOPAEDIC SURGERY

## 2025-08-11 PROCEDURE — 77002 NEEDLE LOCALIZATION BY XRAY: CPT | Mod: 26,,, | Performed by: ORTHOPAEDIC SURGERY

## 2025-08-11 PROCEDURE — 20610 DRAIN/INJ JOINT/BURSA W/O US: CPT | Mod: RT,,, | Performed by: ORTHOPAEDIC SURGERY

## 2025-08-11 RX ORDER — PROPOFOL 10 MG/ML
VIAL (ML) INTRAVENOUS
Status: DISCONTINUED | OUTPATIENT
Start: 2025-08-11 | End: 2025-08-11

## 2025-08-11 RX ORDER — LIDOCAINE HYDROCHLORIDE 10 MG/ML
INJECTION, SOLUTION INFILTRATION; PERINEURAL
Status: DISCONTINUED | OUTPATIENT
Start: 2025-08-11 | End: 2025-08-11 | Stop reason: HOSPADM

## 2025-08-11 RX ORDER — PROPOFOL 10 MG/ML
VIAL (ML) INTRAVENOUS CONTINUOUS PRN
Status: DISCONTINUED | OUTPATIENT
Start: 2025-08-11 | End: 2025-08-11

## 2025-08-11 RX ORDER — LIDOCAINE HYDROCHLORIDE 10 MG/ML
INJECTION, SOLUTION EPIDURAL; INFILTRATION; INTRACAUDAL; PERINEURAL
Status: DISCONTINUED | OUTPATIENT
Start: 2025-08-11 | End: 2025-08-11

## 2025-08-11 RX ORDER — SODIUM CHLORIDE 9 MG/ML
INJECTION, SOLUTION INTRAVENOUS CONTINUOUS
Status: DISCONTINUED | OUTPATIENT
Start: 2025-08-11 | End: 2025-08-11 | Stop reason: HOSPADM

## 2025-08-11 RX ORDER — MUPIROCIN 20 MG/G
OINTMENT TOPICAL
Status: DISCONTINUED | OUTPATIENT
Start: 2025-08-11 | End: 2025-08-11 | Stop reason: HOSPADM

## 2025-08-11 RX ADMIN — PROPOFOL 50 MCG/KG/MIN: 10 INJECTION, EMULSION INTRAVENOUS at 06:08

## 2025-08-11 RX ADMIN — SODIUM CHLORIDE: 9 INJECTION, SOLUTION INTRAVENOUS at 06:08

## 2025-08-11 RX ADMIN — PROPOFOL 30 MG: 10 INJECTION, EMULSION INTRAVENOUS at 06:08

## 2025-08-11 RX ADMIN — LIDOCAINE HYDROCHLORIDE 50 MG: 10 INJECTION, SOLUTION EPIDURAL; INFILTRATION; INTRACAUDAL; PERINEURAL at 06:08

## 2025-08-12 VITALS
OXYGEN SATURATION: 90 % | HEIGHT: 72 IN | DIASTOLIC BLOOD PRESSURE: 71 MMHG | RESPIRATION RATE: 18 BRPM | HEART RATE: 90 BPM | SYSTOLIC BLOOD PRESSURE: 120 MMHG | BODY MASS INDEX: 42.66 KG/M2 | TEMPERATURE: 97 F | WEIGHT: 315 LBS

## 2025-08-13 DIAGNOSIS — T84.010D FAILURE OF RIGHT TOTAL HIP ARTHROPLASTY, SUBSEQUENT ENCOUNTER: Primary | ICD-10-CM

## 2025-08-13 RX ORDER — DOXYCYCLINE 100 MG/1
100 CAPSULE ORAL EVERY 12 HOURS
Qty: 28 CAPSULE | Refills: 0 | Status: SHIPPED | OUTPATIENT
Start: 2025-08-13 | End: 2025-08-27

## 2025-08-13 RX ORDER — CLINDAMYCIN HYDROCHLORIDE 300 MG/1
300 CAPSULE ORAL 3 TIMES DAILY
Qty: 90 CAPSULE | Refills: 0 | Status: SHIPPED | OUTPATIENT
Start: 2025-08-13 | End: 2025-09-12

## 2025-08-14 LAB
BACTERIA FLD CULT: ABNORMAL
BACTERIA SPEC ANAEROBE CULT: NORMAL

## 2025-08-26 ENCOUNTER — OFFICE VISIT (OUTPATIENT)
Dept: ORTHOPEDICS | Facility: CLINIC | Age: 80
End: 2025-08-26
Payer: MEDICARE

## 2025-08-26 DIAGNOSIS — T84.018A FAILED TOTAL HIP ARTHROPLASTY, INITIAL ENCOUNTER: Primary | ICD-10-CM

## 2025-08-26 DIAGNOSIS — Z96.649 FAILED TOTAL HIP ARTHROPLASTY, INITIAL ENCOUNTER: Primary | ICD-10-CM

## 2025-08-27 DIAGNOSIS — R79.9 ABNORMAL FINDING OF BLOOD CHEMISTRY, UNSPECIFIED: ICD-10-CM

## 2025-08-27 DIAGNOSIS — T84.018A FAILED TOTAL HIP ARTHROPLASTY, INITIAL ENCOUNTER: Primary | ICD-10-CM

## 2025-08-27 DIAGNOSIS — M19.90 OSTEOARTHRITIS: ICD-10-CM

## 2025-08-27 DIAGNOSIS — Z96.649 FAILED TOTAL HIP ARTHROPLASTY, INITIAL ENCOUNTER: Primary | ICD-10-CM

## 2025-08-27 RX ORDER — ACETAMINOPHEN 500 MG
1000 TABLET ORAL
OUTPATIENT
Start: 2025-08-27

## 2025-08-27 RX ORDER — ONDANSETRON 4 MG/1
4 TABLET, ORALLY DISINTEGRATING ORAL
OUTPATIENT
Start: 2025-08-27

## 2025-08-27 RX ORDER — TRANEXAMIC ACID 650 MG/1
1950 TABLET ORAL
OUTPATIENT
Start: 2025-08-27 | End: 2025-08-27

## 2025-08-27 RX ORDER — KETOROLAC TROMETHAMINE 10 MG/1
10 TABLET, FILM COATED ORAL
OUTPATIENT
Start: 2025-08-27 | End: 2025-08-27

## 2025-08-27 RX ORDER — SODIUM CHLORIDE, SODIUM GLUCONATE, SODIUM ACETATE, POTASSIUM CHLORIDE AND MAGNESIUM CHLORIDE 30; 37; 368; 526; 502 MG/100ML; MG/100ML; MG/100ML; MG/100ML; MG/100ML
INJECTION, SOLUTION INTRAVENOUS CONTINUOUS
OUTPATIENT
Start: 2025-08-27

## 2025-08-27 RX ORDER — SCOPOLAMINE 1 MG/3D
1 PATCH, EXTENDED RELEASE TRANSDERMAL ONCE AS NEEDED
OUTPATIENT
Start: 2025-08-27 | End: 2037-01-23

## 2025-08-27 RX ORDER — TAMSULOSIN HYDROCHLORIDE 0.4 MG/1
0.4 CAPSULE ORAL
OUTPATIENT
Start: 2025-08-27

## 2025-08-27 RX ORDER — GABAPENTIN 100 MG/1
300 CAPSULE ORAL
OUTPATIENT
Start: 2025-08-27

## 2025-09-02 ENCOUNTER — OFFICE VISIT (OUTPATIENT)
Dept: ORTHOPEDICS | Facility: CLINIC | Age: 80
End: 2025-09-02
Payer: MEDICARE

## 2025-09-02 ENCOUNTER — HOSPITAL ENCOUNTER (OUTPATIENT)
Dept: RADIOLOGY | Facility: HOSPITAL | Age: 80
Discharge: HOME | End: 2025-09-02
Attending: ORTHOPAEDIC SURGERY
Payer: MEDICARE

## 2025-09-02 VITALS
SYSTOLIC BLOOD PRESSURE: 93 MMHG | BODY MASS INDEX: 42.66 KG/M2 | HEIGHT: 72 IN | DIASTOLIC BLOOD PRESSURE: 62 MMHG | WEIGHT: 315 LBS | HEART RATE: 72 BPM

## 2025-09-02 DIAGNOSIS — T84.018A FAILED TOTAL HIP ARTHROPLASTY, INITIAL ENCOUNTER: ICD-10-CM

## 2025-09-02 DIAGNOSIS — Z96.649 FAILED TOTAL HIP ARTHROPLASTY, INITIAL ENCOUNTER: ICD-10-CM

## 2025-09-02 DIAGNOSIS — T84.010D FAILURE OF RIGHT TOTAL HIP ARTHROPLASTY, SUBSEQUENT ENCOUNTER: ICD-10-CM

## 2025-09-02 DIAGNOSIS — R79.9 ABNORMAL FINDING OF BLOOD CHEMISTRY, UNSPECIFIED: Primary | ICD-10-CM

## 2025-09-02 PROCEDURE — 71046 X-RAY EXAM CHEST 2 VIEWS: CPT | Mod: TC

## (undated) DEVICE — SHAFT BIXCUT MOD TRINKLE 284MM

## (undated) DEVICE — KIT DRAIN WOUND RND SPRNG RESV

## (undated) DEVICE — BANDAGE ADHESIVE FABRIC 2X4

## (undated) DEVICE — DRAPE MEDIUM SHEET 40X70IN

## (undated) DEVICE — ELECTRODE PATIENT RETURN DISP

## (undated) DEVICE — SYR DISP LL 5CC

## (undated) DEVICE — SYR 30CC LUER LOCK

## (undated) DEVICE — SYR 10CC LUER LOCK

## (undated) DEVICE — APPLICATOR CHLORAPREP ORN 26ML

## (undated) DEVICE — GLOVE SENSICARE PI ORTHO LT 8

## (undated) DEVICE — CULTSWAB+ AMIES W/O CHARC DBL

## (undated) DEVICE — NDL HYPO REG 25G X 1 1/2

## (undated) DEVICE — STAPLER SKIN PROXIMATE WIDE

## (undated) DEVICE — GLOVE SENSICARE PI GRN 7

## (undated) DEVICE — COVER HD BACK TABLE 6FT

## (undated) DEVICE — CONTAINER SPECIMEN SCREW 4OZ

## (undated) DEVICE — Device

## (undated) DEVICE — DRAPE INCISE IOBAN 2 23X23IN

## (undated) DEVICE — KIT SURGICAL TURNOVER

## (undated) DEVICE — NDL ANES SPINAL 18X3.5ST 18G

## (undated) DEVICE — SUT 1 36IN PDS II VIO MONO

## (undated) DEVICE — SPONGE COTTON TRAY 4X4IN

## (undated) DEVICE — GLOVE SENSICARE PI GRN 8.5

## (undated) DEVICE — GUIDE WIRE, BALL-TIPPED, STERILE
Type: IMPLANTABLE DEVICE | Site: HIP | Status: NON-FUNCTIONAL
Removed: 2024-04-08

## (undated) DEVICE — TOWEL OR DISP STRL BLUE 4/PK

## (undated) DEVICE — SOL NACL IRR 1000ML BTL

## (undated) DEVICE — DRAPE SURG W/TWL 17 5/8X23

## (undated) DEVICE — DRAPE FULL SHEET 70X100IN

## (undated) DEVICE — DRAPE STERI U-SHAPED 47X51IN

## (undated) DEVICE — SUT MONO 2-0 CT-1 VIL

## (undated) DEVICE — KIT TOTAL HIP HLGC

## (undated) DEVICE — TOWEL OR BLUE STRL 16X26 4/PK

## (undated) DEVICE — CONTRAST ISOVUE 300 50ML

## (undated) DEVICE — GLOVE PROTEXIS HYDROGEL SZ8.5

## (undated) DEVICE — NDL BLUNT FILL 18G 1IN

## (undated) DEVICE — GLOVE SENSICARE PI MICRO 6.5

## (undated) DEVICE — TAPE ADH MEDIPORE 4 X 10YDS

## (undated) DEVICE — SOL POVIDONE IODINE PCH 3/4OZ

## (undated) DEVICE — DEVICE STRATAFIX SYMMETRIC +

## (undated) DEVICE — SUT BLU BR 2 TAPERD NDL 1/2

## (undated) DEVICE — NDL SPINAL 22GA 3.5 IN QUINCKE

## (undated) DEVICE — PAD ABDOMINAL STERILE 8X10IN

## (undated) DEVICE — SOL NACL IRR 3000ML

## (undated) DEVICE — DRESSING XEROFORM NONADH 1X8IN

## (undated) DEVICE — PILLOW ABDUCTION FOAM MED

## (undated) DEVICE — RETRIEVER SUTURE HEWSON DISP

## (undated) DEVICE — GLOVE SIGNATURE ESSNTL LTX 8.5

## (undated) DEVICE — GOWN POLY REINF X-LONG 2XL

## (undated) DEVICE — STRAP POS KNEE BODY 4X60IN